# Patient Record
Sex: MALE | Race: WHITE | NOT HISPANIC OR LATINO | URBAN - METROPOLITAN AREA
[De-identification: names, ages, dates, MRNs, and addresses within clinical notes are randomized per-mention and may not be internally consistent; named-entity substitution may affect disease eponyms.]

---

## 2018-07-27 ENCOUNTER — TELEPHONE (OUTPATIENT)
Dept: NEUROSURGERY | Facility: CLINIC | Age: 48
End: 2018-07-27

## 2018-07-27 RX ORDER — LIDOCAINE 50 MG/G
1 PATCH TOPICAL DAILY
Qty: 30 PATCH | Refills: 1 | Status: SHIPPED | OUTPATIENT
Start: 2018-07-27 | End: 2018-08-26

## 2018-07-27 NOTE — TELEPHONE ENCOUNTER
Patient would like a phone call back regarding his pain. He states that he is loosing weight and in agony and doesn't know what to do. Please give him a call back at your earliest convenience.

## 2018-08-15 ENCOUNTER — TELEPHONE (OUTPATIENT)
Dept: NEUROSURGERY | Facility: CLINIC | Age: 48
End: 2018-08-15

## 2018-08-15 NOTE — TELEPHONE ENCOUNTER
Patient called he is experiencing T12 level sharp stabbing pain  that is traveling down to right side please  Pt has been using ice pack  and discuss with Qi about  lidocaine patch  not working  with please call patient at [phone]

## 2018-08-16 NOTE — TELEPHONE ENCOUNTER
Spoke with patient, rec pain management as dr mckeon had suggested.  Also offered xray but he declined.  Gave patient number to [facility] mgmnt.

## 2020-06-04 ENCOUNTER — HOSPITAL ENCOUNTER (INPATIENT)
Facility: HOSPITAL | Age: 50
LOS: 2 days | Discharge: HOME | DRG: 871 | End: 2020-06-06
Attending: EMERGENCY MEDICINE | Admitting: INTERNAL MEDICINE
Payer: COMMERCIAL

## 2020-06-04 ENCOUNTER — APPOINTMENT (EMERGENCY)
Dept: CT IMAGING | Facility: HOSPITAL | Age: 50
DRG: 871 | End: 2020-06-04
Payer: COMMERCIAL

## 2020-06-04 DIAGNOSIS — J18.9 MULTIFOCAL PNEUMONIA: ICD-10-CM

## 2020-06-04 DIAGNOSIS — D72.829 LEUKOCYTOSIS: Primary | ICD-10-CM

## 2020-06-04 DIAGNOSIS — I48.91 NEW ONSET ATRIAL FIBRILLATION (HCC): ICD-10-CM

## 2020-06-04 DIAGNOSIS — J18.9 SEPSIS DUE TO PNEUMONIA (HCC): ICD-10-CM

## 2020-06-04 DIAGNOSIS — A41.9 SEPSIS DUE TO PNEUMONIA (HCC): ICD-10-CM

## 2020-06-04 DIAGNOSIS — I48.91 ATRIAL FIBRILLATION WITH RVR (HCC): ICD-10-CM

## 2020-06-04 LAB
ALBUMIN SERPL BCP-MCNC: 4.1 G/DL (ref 3.5–5)
ALP SERPL-CCNC: 72 U/L (ref 46–116)
ALT SERPL W P-5'-P-CCNC: 36 U/L (ref 12–78)
ANION GAP SERPL CALCULATED.3IONS-SCNC: 7 MMOL/L (ref 4–13)
AST SERPL W P-5'-P-CCNC: 27 U/L (ref 5–45)
BACTERIA UR QL AUTO: ABNORMAL /HPF
BASOPHILS # BLD AUTO: 0.07 THOUSANDS/ΜL (ref 0–0.1)
BASOPHILS NFR BLD AUTO: 0 % (ref 0–1)
BILIRUB SERPL-MCNC: 0.9 MG/DL (ref 0.2–1)
BILIRUB UR QL STRIP: NEGATIVE
BUN SERPL-MCNC: 11 MG/DL (ref 5–25)
CALCIUM SERPL-MCNC: 8.9 MG/DL (ref 8.3–10.1)
CHLORIDE SERPL-SCNC: 101 MMOL/L (ref 100–108)
CLARITY UR: CLEAR
CO2 SERPL-SCNC: 27 MMOL/L (ref 21–32)
COLOR UR: YELLOW
CREAT SERPL-MCNC: 0.81 MG/DL (ref 0.6–1.3)
EOSINOPHIL # BLD AUTO: 0.02 THOUSAND/ΜL (ref 0–0.61)
EOSINOPHIL NFR BLD AUTO: 0 % (ref 0–6)
ERYTHROCYTE [DISTWIDTH] IN BLOOD BY AUTOMATED COUNT: 12.9 % (ref 11.6–15.1)
GFR SERPL CREATININE-BSD FRML MDRD: 105 ML/MIN/1.73SQ M
GLUCOSE SERPL-MCNC: 106 MG/DL (ref 65–140)
GLUCOSE UR STRIP-MCNC: NEGATIVE MG/DL
HCT VFR BLD AUTO: 46.5 % (ref 36.5–49.3)
HGB BLD-MCNC: 15.8 G/DL (ref 12–17)
HGB UR QL STRIP.AUTO: ABNORMAL
IMM GRANULOCYTES # BLD AUTO: 0.13 THOUSAND/UL (ref 0–0.2)
IMM GRANULOCYTES NFR BLD AUTO: 1 % (ref 0–2)
KETONES UR STRIP-MCNC: NEGATIVE MG/DL
LACTATE SERPL-SCNC: 1.1 MMOL/L (ref 0.5–2)
LEUKOCYTE ESTERASE UR QL STRIP: ABNORMAL
LIPASE SERPL-CCNC: 96 U/L (ref 73–393)
LYMPHOCYTES # BLD AUTO: 0.98 THOUSANDS/ΜL (ref 0.6–4.47)
LYMPHOCYTES NFR BLD AUTO: 5 % (ref 14–44)
MAGNESIUM SERPL-MCNC: 2 MG/DL (ref 1.6–2.6)
MCH RBC QN AUTO: 28.8 PG (ref 26.8–34.3)
MCHC RBC AUTO-ENTMCNC: 34 G/DL (ref 31.4–37.4)
MCV RBC AUTO: 85 FL (ref 82–98)
MONOCYTES # BLD AUTO: 1.03 THOUSAND/ΜL (ref 0.17–1.22)
MONOCYTES NFR BLD AUTO: 5 % (ref 4–12)
NEUTROPHILS # BLD AUTO: 17.85 THOUSANDS/ΜL (ref 1.85–7.62)
NEUTS SEG NFR BLD AUTO: 89 % (ref 43–75)
NITRITE UR QL STRIP: NEGATIVE
NON-SQ EPI CELLS URNS QL MICRO: ABNORMAL /HPF
NRBC BLD AUTO-RTO: 0 /100 WBCS
PH UR STRIP.AUTO: 6 [PH]
PLATELET # BLD AUTO: 247 THOUSANDS/UL (ref 149–390)
PMV BLD AUTO: 10.5 FL (ref 8.9–12.7)
POTASSIUM SERPL-SCNC: 3.7 MMOL/L (ref 3.5–5.3)
PROT SERPL-MCNC: 8.1 G/DL (ref 6.4–8.2)
PROT UR STRIP-MCNC: NEGATIVE MG/DL
RBC # BLD AUTO: 5.49 MILLION/UL (ref 3.88–5.62)
RBC #/AREA URNS AUTO: ABNORMAL /HPF
SARS-COV-2 RNA RESP QL NAA+PROBE: NEGATIVE
SODIUM SERPL-SCNC: 135 MMOL/L (ref 136–145)
SP GR UR STRIP.AUTO: 1.01 (ref 1–1.03)
TROPONIN I SERPL-MCNC: <0.02 NG/ML
UROBILINOGEN UR QL STRIP.AUTO: 0.2 E.U./DL
WBC # BLD AUTO: 20.08 THOUSAND/UL (ref 4.31–10.16)
WBC #/AREA URNS AUTO: ABNORMAL /HPF

## 2020-06-04 PROCEDURE — 87635 SARS-COV-2 COVID-19 AMP PRB: CPT | Performed by: PHYSICIAN ASSISTANT

## 2020-06-04 PROCEDURE — 94760 N-INVAS EAR/PLS OXIMETRY 1: CPT

## 2020-06-04 PROCEDURE — 94664 DEMO&/EVAL PT USE INHALER: CPT

## 2020-06-04 PROCEDURE — 71260 CT THORAX DX C+: CPT

## 2020-06-04 PROCEDURE — 84484 ASSAY OF TROPONIN QUANT: CPT | Performed by: PHYSICIAN ASSISTANT

## 2020-06-04 PROCEDURE — 83735 ASSAY OF MAGNESIUM: CPT | Performed by: PHYSICIAN ASSISTANT

## 2020-06-04 PROCEDURE — 36415 COLL VENOUS BLD VENIPUNCTURE: CPT | Performed by: PHYSICIAN ASSISTANT

## 2020-06-04 PROCEDURE — 80053 COMPREHEN METABOLIC PANEL: CPT | Performed by: PHYSICIAN ASSISTANT

## 2020-06-04 PROCEDURE — 99223 1ST HOSP IP/OBS HIGH 75: CPT | Performed by: PHYSICIAN ASSISTANT

## 2020-06-04 PROCEDURE — 87040 BLOOD CULTURE FOR BACTERIA: CPT | Performed by: PHYSICIAN ASSISTANT

## 2020-06-04 PROCEDURE — 74177 CT ABD & PELVIS W/CONTRAST: CPT

## 2020-06-04 PROCEDURE — 99285 EMERGENCY DEPT VISIT HI MDM: CPT | Performed by: PHYSICIAN ASSISTANT

## 2020-06-04 PROCEDURE — 85025 COMPLETE CBC W/AUTO DIFF WBC: CPT | Performed by: PHYSICIAN ASSISTANT

## 2020-06-04 PROCEDURE — 93005 ELECTROCARDIOGRAM TRACING: CPT

## 2020-06-04 PROCEDURE — 99285 EMERGENCY DEPT VISIT HI MDM: CPT

## 2020-06-04 PROCEDURE — 81001 URINALYSIS AUTO W/SCOPE: CPT | Performed by: PHYSICIAN ASSISTANT

## 2020-06-04 PROCEDURE — 83605 ASSAY OF LACTIC ACID: CPT | Performed by: PHYSICIAN ASSISTANT

## 2020-06-04 PROCEDURE — 83690 ASSAY OF LIPASE: CPT | Performed by: PHYSICIAN ASSISTANT

## 2020-06-04 PROCEDURE — 96361 HYDRATE IV INFUSION ADD-ON: CPT

## 2020-06-04 PROCEDURE — 96374 THER/PROPH/DIAG INJ IV PUSH: CPT

## 2020-06-04 RX ORDER — TIZANIDINE 4 MG/1
4 TABLET ORAL
Status: DISCONTINUED | OUTPATIENT
Start: 2020-06-04 | End: 2020-06-06 | Stop reason: HOSPADM

## 2020-06-04 RX ORDER — CEFEPIME HYDROCHLORIDE 2 G/50ML
2000 INJECTION, SOLUTION INTRAVENOUS ONCE
Status: COMPLETED | OUTPATIENT
Start: 2020-06-04 | End: 2020-06-04

## 2020-06-04 RX ORDER — DIAZEPAM 5 MG/1
10 TABLET ORAL
Status: DISCONTINUED | OUTPATIENT
Start: 2020-06-04 | End: 2020-06-06 | Stop reason: HOSPADM

## 2020-06-04 RX ORDER — ONDANSETRON 2 MG/ML
4 INJECTION INTRAMUSCULAR; INTRAVENOUS EVERY 6 HOURS PRN
Status: DISCONTINUED | OUTPATIENT
Start: 2020-06-04 | End: 2020-06-06 | Stop reason: HOSPADM

## 2020-06-04 RX ORDER — GABAPENTIN 400 MG/1
400 CAPSULE ORAL 3 TIMES DAILY
Status: DISCONTINUED | OUTPATIENT
Start: 2020-06-04 | End: 2020-06-05

## 2020-06-04 RX ORDER — SODIUM CHLORIDE 9 MG/ML
100 INJECTION, SOLUTION INTRAVENOUS CONTINUOUS
Status: DISCONTINUED | OUTPATIENT
Start: 2020-06-04 | End: 2020-06-05

## 2020-06-04 RX ORDER — OXYBUTYNIN CHLORIDE 5 MG/1
10 TABLET, EXTENDED RELEASE ORAL 2 TIMES DAILY
Status: DISCONTINUED | OUTPATIENT
Start: 2020-06-05 | End: 2020-06-05

## 2020-06-04 RX ORDER — ONDANSETRON 2 MG/ML
4 INJECTION INTRAMUSCULAR; INTRAVENOUS ONCE
Status: COMPLETED | OUTPATIENT
Start: 2020-06-04 | End: 2020-06-04

## 2020-06-04 RX ADMIN — DIAZEPAM 10 MG: 5 TABLET ORAL at 23:39

## 2020-06-04 RX ADMIN — SODIUM CHLORIDE 100 ML/HR: 0.9 INJECTION, SOLUTION INTRAVENOUS at 23:34

## 2020-06-04 RX ADMIN — ONDANSETRON 4 MG: 2 INJECTION INTRAMUSCULAR; INTRAVENOUS at 17:11

## 2020-06-04 RX ADMIN — IOHEXOL 100 ML: 350 INJECTION, SOLUTION INTRAVENOUS at 18:21

## 2020-06-04 RX ADMIN — VANCOMYCIN HYDROCHLORIDE 1250 MG: 1 INJECTION, POWDER, LYOPHILIZED, FOR SOLUTION INTRAVENOUS at 21:53

## 2020-06-04 RX ADMIN — CEFEPIME HYDROCHLORIDE 2000 MG: 2 INJECTION, SOLUTION INTRAVENOUS at 20:02

## 2020-06-04 RX ADMIN — SODIUM CHLORIDE 1000 ML: 0.9 INJECTION, SOLUTION INTRAVENOUS at 17:10

## 2020-06-04 RX ADMIN — GABAPENTIN 400 MG: 400 CAPSULE ORAL at 23:26

## 2020-06-04 RX ADMIN — TIZANIDINE 4 MG: 4 TABLET ORAL at 23:27

## 2020-06-04 RX ADMIN — APIXABAN 5 MG: 5 TABLET, FILM COATED ORAL at 23:26

## 2020-06-05 ENCOUNTER — APPOINTMENT (INPATIENT)
Dept: NON INVASIVE DIAGNOSTICS | Facility: HOSPITAL | Age: 50
DRG: 871 | End: 2020-06-05
Payer: COMMERCIAL

## 2020-06-05 PROBLEM — G82.20 PARAPLEGIA (HCC): Status: ACTIVE | Noted: 2020-06-05

## 2020-06-05 LAB
ALBUMIN SERPL BCP-MCNC: 3 G/DL (ref 3.5–5)
ALP SERPL-CCNC: 54 U/L (ref 46–116)
ALT SERPL W P-5'-P-CCNC: 28 U/L (ref 12–78)
ANION GAP SERPL CALCULATED.3IONS-SCNC: 5 MMOL/L (ref 4–13)
AST SERPL W P-5'-P-CCNC: 20 U/L (ref 5–45)
ATRIAL RATE: 0 BPM
ATRIAL RATE: 113 BPM
BILIRUB SERPL-MCNC: 0.8 MG/DL (ref 0.2–1)
BUN SERPL-MCNC: 15 MG/DL (ref 5–25)
CALCIUM SERPL-MCNC: 8.3 MG/DL (ref 8.3–10.1)
CHLORIDE SERPL-SCNC: 106 MMOL/L (ref 100–108)
CO2 SERPL-SCNC: 29 MMOL/L (ref 21–32)
CREAT SERPL-MCNC: 1.04 MG/DL (ref 0.6–1.3)
ERYTHROCYTE [DISTWIDTH] IN BLOOD BY AUTOMATED COUNT: 13.2 % (ref 11.6–15.1)
GFR SERPL CREATININE-BSD FRML MDRD: 85 ML/MIN/1.73SQ M
GLUCOSE SERPL-MCNC: 104 MG/DL (ref 65–140)
HCT VFR BLD AUTO: 41.6 % (ref 36.5–49.3)
HGB BLD-MCNC: 12.9 G/DL (ref 12–17)
MAGNESIUM SERPL-MCNC: 2.2 MG/DL (ref 1.6–2.6)
MCH RBC QN AUTO: 27.4 PG (ref 26.8–34.3)
MCHC RBC AUTO-ENTMCNC: 31 G/DL (ref 31.4–37.4)
MCV RBC AUTO: 88 FL (ref 82–98)
PHOSPHATE SERPL-MCNC: 3.4 MG/DL (ref 2.7–4.5)
PLATELET # BLD AUTO: 220 THOUSANDS/UL (ref 149–390)
PMV BLD AUTO: 10.8 FL (ref 8.9–12.7)
POTASSIUM SERPL-SCNC: 3.8 MMOL/L (ref 3.5–5.3)
PROCALCITONIN SERPL-MCNC: 0.06 NG/ML
PROCALCITONIN SERPL-MCNC: 0.08 NG/ML
PROT SERPL-MCNC: 6.6 G/DL (ref 6.4–8.2)
QRS AXIS: 0 DEGREES
QRS AXIS: 45 DEGREES
QRSD INTERVAL: 0 MS
QRSD INTERVAL: 82 MS
QT INTERVAL: 0 MS
QT INTERVAL: 294 MS
QTC INTERVAL: 0 MS
QTC INTERVAL: 463 MS
RBC # BLD AUTO: 4.71 MILLION/UL (ref 3.88–5.62)
SODIUM SERPL-SCNC: 140 MMOL/L (ref 136–145)
T WAVE AXIS: 0 DEGREES
T WAVE AXIS: 23 DEGREES
TROPONIN I SERPL-MCNC: <0.02 NG/ML
TROPONIN I SERPL-MCNC: <0.02 NG/ML
VENTRICULAR RATE: 0 BPM
VENTRICULAR RATE: 149 BPM
WBC # BLD AUTO: 11.55 THOUSAND/UL (ref 4.31–10.16)

## 2020-06-05 PROCEDURE — 84145 PROCALCITONIN (PCT): CPT | Performed by: PHYSICIAN ASSISTANT

## 2020-06-05 PROCEDURE — 85027 COMPLETE CBC AUTOMATED: CPT | Performed by: PHYSICIAN ASSISTANT

## 2020-06-05 PROCEDURE — 83735 ASSAY OF MAGNESIUM: CPT | Performed by: PHYSICIAN ASSISTANT

## 2020-06-05 PROCEDURE — 93010 ELECTROCARDIOGRAM REPORT: CPT | Performed by: INTERNAL MEDICINE

## 2020-06-05 PROCEDURE — 99232 SBSQ HOSP IP/OBS MODERATE 35: CPT | Performed by: FAMILY MEDICINE

## 2020-06-05 PROCEDURE — 87449 NOS EACH ORGANISM AG IA: CPT | Performed by: PHYSICIAN ASSISTANT

## 2020-06-05 PROCEDURE — 93306 TTE W/DOPPLER COMPLETE: CPT | Performed by: INTERNAL MEDICINE

## 2020-06-05 PROCEDURE — 84484 ASSAY OF TROPONIN QUANT: CPT | Performed by: PHYSICIAN ASSISTANT

## 2020-06-05 PROCEDURE — 84100 ASSAY OF PHOSPHORUS: CPT | Performed by: PHYSICIAN ASSISTANT

## 2020-06-05 PROCEDURE — 80053 COMPREHEN METABOLIC PANEL: CPT | Performed by: PHYSICIAN ASSISTANT

## 2020-06-05 PROCEDURE — 93306 TTE W/DOPPLER COMPLETE: CPT

## 2020-06-05 PROCEDURE — 99222 1ST HOSP IP/OBS MODERATE 55: CPT | Performed by: INTERNAL MEDICINE

## 2020-06-05 RX ORDER — SODIUM CHLORIDE 9 MG/ML
100 INJECTION, SOLUTION INTRAVENOUS ONCE
Status: COMPLETED | OUTPATIENT
Start: 2020-06-05 | End: 2020-06-05

## 2020-06-05 RX ORDER — CEFEPIME HYDROCHLORIDE 2 G/50ML
2000 INJECTION, SOLUTION INTRAVENOUS EVERY 12 HOURS
Status: DISCONTINUED | OUTPATIENT
Start: 2020-06-05 | End: 2020-06-05

## 2020-06-05 RX ORDER — GABAPENTIN 400 MG/1
800 CAPSULE ORAL 2 TIMES DAILY
Status: DISCONTINUED | OUTPATIENT
Start: 2020-06-05 | End: 2020-06-06 | Stop reason: HOSPADM

## 2020-06-05 RX ORDER — CEFTRIAXONE 1 G/50ML
1000 INJECTION, SOLUTION INTRAVENOUS EVERY 24 HOURS
Status: DISCONTINUED | OUTPATIENT
Start: 2020-06-05 | End: 2020-06-06 | Stop reason: HOSPADM

## 2020-06-05 RX ORDER — OXYBUTYNIN CHLORIDE 5 MG/1
5 TABLET, EXTENDED RELEASE ORAL DAILY
Status: DISCONTINUED | OUTPATIENT
Start: 2020-06-06 | End: 2020-06-06 | Stop reason: HOSPADM

## 2020-06-05 RX ORDER — GABAPENTIN 400 MG/1
400 CAPSULE ORAL
Status: DISCONTINUED | OUTPATIENT
Start: 2020-06-05 | End: 2020-06-06 | Stop reason: HOSPADM

## 2020-06-05 RX ADMIN — CEFTRIAXONE 1000 MG: 1 INJECTION, SOLUTION INTRAVENOUS at 08:45

## 2020-06-05 RX ADMIN — DIAZEPAM 10 MG: 5 TABLET ORAL at 21:47

## 2020-06-05 RX ADMIN — APIXABAN 5 MG: 5 TABLET, FILM COATED ORAL at 08:46

## 2020-06-05 RX ADMIN — GABAPENTIN 400 MG: 400 CAPSULE ORAL at 21:47

## 2020-06-05 RX ADMIN — SODIUM CHLORIDE 100 ML/HR: 0.9 INJECTION, SOLUTION INTRAVENOUS at 08:45

## 2020-06-05 RX ADMIN — GABAPENTIN 800 MG: 400 CAPSULE ORAL at 16:55

## 2020-06-05 RX ADMIN — SODIUM CHLORIDE 500 MG: 0.9 INJECTION, SOLUTION INTRAVENOUS at 11:11

## 2020-06-05 RX ADMIN — GABAPENTIN 400 MG: 400 CAPSULE ORAL at 08:45

## 2020-06-05 RX ADMIN — TIZANIDINE 4 MG: 4 TABLET ORAL at 21:47

## 2020-06-05 RX ADMIN — OXYBUTYNIN CHLORIDE 5 MG: 5 TABLET, EXTENDED RELEASE ORAL at 08:46

## 2020-06-06 VITALS
WEIGHT: 179.23 LBS | TEMPERATURE: 98.3 F | HEIGHT: 70 IN | RESPIRATION RATE: 18 BRPM | SYSTOLIC BLOOD PRESSURE: 123 MMHG | OXYGEN SATURATION: 99 % | BODY MASS INDEX: 25.66 KG/M2 | HEART RATE: 81 BPM | DIASTOLIC BLOOD PRESSURE: 79 MMHG

## 2020-06-06 LAB
ANION GAP SERPL CALCULATED.3IONS-SCNC: 4 MMOL/L (ref 4–13)
BASOPHILS # BLD AUTO: 0.06 THOUSANDS/ΜL (ref 0–0.1)
BASOPHILS NFR BLD AUTO: 1 % (ref 0–1)
BUN SERPL-MCNC: 10 MG/DL (ref 5–25)
CALCIUM SERPL-MCNC: 8.4 MG/DL (ref 8.3–10.1)
CHLORIDE SERPL-SCNC: 105 MMOL/L (ref 100–108)
CO2 SERPL-SCNC: 30 MMOL/L (ref 21–32)
CREAT SERPL-MCNC: 0.84 MG/DL (ref 0.6–1.3)
EOSINOPHIL # BLD AUTO: 0.15 THOUSAND/ΜL (ref 0–0.61)
EOSINOPHIL NFR BLD AUTO: 2 % (ref 0–6)
ERYTHROCYTE [DISTWIDTH] IN BLOOD BY AUTOMATED COUNT: 13.2 % (ref 11.6–15.1)
GFR SERPL CREATININE-BSD FRML MDRD: 104 ML/MIN/1.73SQ M
GLUCOSE SERPL-MCNC: 111 MG/DL (ref 65–140)
HCT VFR BLD AUTO: 41.6 % (ref 36.5–49.3)
HGB BLD-MCNC: 13.5 G/DL (ref 12–17)
IMM GRANULOCYTES # BLD AUTO: 0.02 THOUSAND/UL (ref 0–0.2)
IMM GRANULOCYTES NFR BLD AUTO: 0 % (ref 0–2)
L PNEUMO1 AG UR QL IA.RAPID: NEGATIVE
LYMPHOCYTES # BLD AUTO: 1.55 THOUSANDS/ΜL (ref 0.6–4.47)
LYMPHOCYTES NFR BLD AUTO: 22 % (ref 14–44)
MCH RBC QN AUTO: 28.9 PG (ref 26.8–34.3)
MCHC RBC AUTO-ENTMCNC: 32.5 G/DL (ref 31.4–37.4)
MCV RBC AUTO: 89 FL (ref 82–98)
MONOCYTES # BLD AUTO: 0.58 THOUSAND/ΜL (ref 0.17–1.22)
MONOCYTES NFR BLD AUTO: 8 % (ref 4–12)
NEUTROPHILS # BLD AUTO: 4.82 THOUSANDS/ΜL (ref 1.85–7.62)
NEUTS SEG NFR BLD AUTO: 67 % (ref 43–75)
NRBC BLD AUTO-RTO: 0 /100 WBCS
PLATELET # BLD AUTO: 207 THOUSANDS/UL (ref 149–390)
PMV BLD AUTO: 10.1 FL (ref 8.9–12.7)
POTASSIUM SERPL-SCNC: 3.8 MMOL/L (ref 3.5–5.3)
PROCALCITONIN SERPL-MCNC: <0.05 NG/ML
RBC # BLD AUTO: 4.67 MILLION/UL (ref 3.88–5.62)
S PNEUM AG UR QL: NEGATIVE
SODIUM SERPL-SCNC: 139 MMOL/L (ref 136–145)
WBC # BLD AUTO: 7.18 THOUSAND/UL (ref 4.31–10.16)

## 2020-06-06 PROCEDURE — 84145 PROCALCITONIN (PCT): CPT | Performed by: FAMILY MEDICINE

## 2020-06-06 PROCEDURE — 80048 BASIC METABOLIC PNL TOTAL CA: CPT | Performed by: FAMILY MEDICINE

## 2020-06-06 PROCEDURE — 99239 HOSP IP/OBS DSCHRG MGMT >30: CPT | Performed by: FAMILY MEDICINE

## 2020-06-06 PROCEDURE — 85025 COMPLETE CBC W/AUTO DIFF WBC: CPT | Performed by: FAMILY MEDICINE

## 2020-06-06 RX ORDER — CEFUROXIME AXETIL 500 MG/1
500 TABLET ORAL EVERY 12 HOURS SCHEDULED
Qty: 12 TABLET | Refills: 0 | Status: SHIPPED | OUTPATIENT
Start: 2020-06-06 | End: 2020-06-12

## 2020-06-06 RX ADMIN — CEFTRIAXONE 1000 MG: 1 INJECTION, SOLUTION INTRAVENOUS at 08:17

## 2020-06-06 RX ADMIN — GABAPENTIN 800 MG: 400 CAPSULE ORAL at 06:28

## 2020-06-06 RX ADMIN — SODIUM CHLORIDE 500 MG: 0.9 INJECTION, SOLUTION INTRAVENOUS at 09:03

## 2020-06-09 LAB
BACTERIA BLD CULT: NORMAL
BACTERIA BLD CULT: NORMAL

## 2020-07-24 ENCOUNTER — TELEPHONE (OUTPATIENT)
Dept: CARDIOLOGY CLINIC | Facility: HOSPITAL | Age: 50
End: 2020-07-24

## 2020-07-24 NOTE — TELEPHONE ENCOUNTER
Spoke with Serena Denise he does not feel it is necessary to come as his problems are gone  He will call back if needed

## 2021-03-03 ENCOUNTER — HOSPITAL ENCOUNTER (EMERGENCY)
Facility: HOSPITAL | Age: 51
Discharge: HOME | End: 2021-03-03
Attending: EMERGENCY MEDICINE
Payer: OTHER MISCELLANEOUS

## 2021-03-03 VITALS
BODY MASS INDEX: 25.66 KG/M2 | HEART RATE: 98 BPM | DIASTOLIC BLOOD PRESSURE: 98 MMHG | HEIGHT: 70 IN | OXYGEN SATURATION: 96 % | TEMPERATURE: 100.1 F | WEIGHT: 179.23 LBS | RESPIRATION RATE: 16 BRPM | SYSTOLIC BLOOD PRESSURE: 158 MMHG

## 2021-03-03 DIAGNOSIS — N39.0 UTI (URINARY TRACT INFECTION): Primary | ICD-10-CM

## 2021-03-03 LAB
ANION GAP SERPL CALCULATED.3IONS-SCNC: 10 MMOL/L (ref 4–13)
BACTERIA UR QL AUTO: NORMAL /HPF
BASOPHILS # BLD AUTO: 0.08 THOUSANDS/ΜL (ref 0–0.1)
BASOPHILS NFR BLD AUTO: 1 % (ref 0–1)
BILIRUB UR QL STRIP: NEGATIVE
BUN SERPL-MCNC: 13 MG/DL (ref 5–25)
CALCIUM SERPL-MCNC: 9.3 MG/DL (ref 8.3–10.1)
CHLORIDE SERPL-SCNC: 101 MMOL/L (ref 100–108)
CLARITY UR: CLEAR
CO2 SERPL-SCNC: 27 MMOL/L (ref 21–32)
COLOR UR: YELLOW
CREAT SERPL-MCNC: 0.98 MG/DL (ref 0.6–1.3)
EOSINOPHIL # BLD AUTO: 0.11 THOUSAND/ΜL (ref 0–0.61)
EOSINOPHIL NFR BLD AUTO: 1 % (ref 0–6)
ERYTHROCYTE [DISTWIDTH] IN BLOOD BY AUTOMATED COUNT: 12.6 % (ref 11.6–15.1)
GFR SERPL CREATININE-BSD FRML MDRD: 90 ML/MIN/1.73SQ M
GLUCOSE SERPL-MCNC: 110 MG/DL (ref 65–140)
GLUCOSE UR STRIP-MCNC: NEGATIVE MG/DL
HCT VFR BLD AUTO: 44.9 % (ref 36.5–49.3)
HGB BLD-MCNC: 15 G/DL (ref 12–17)
HGB UR QL STRIP.AUTO: NEGATIVE
IMM GRANULOCYTES # BLD AUTO: 0.07 THOUSAND/UL (ref 0–0.2)
IMM GRANULOCYTES NFR BLD AUTO: 0 % (ref 0–2)
KETONES UR STRIP-MCNC: NEGATIVE MG/DL
LEUKOCYTE ESTERASE UR QL STRIP: ABNORMAL
LYMPHOCYTES # BLD AUTO: 1 THOUSANDS/ΜL (ref 0.6–4.47)
LYMPHOCYTES NFR BLD AUTO: 6 % (ref 14–44)
MCH RBC QN AUTO: 28.5 PG (ref 26.8–34.3)
MCHC RBC AUTO-ENTMCNC: 33.4 G/DL (ref 31.4–37.4)
MCV RBC AUTO: 85 FL (ref 82–98)
MONOCYTES # BLD AUTO: 0.85 THOUSAND/ΜL (ref 0.17–1.22)
MONOCYTES NFR BLD AUTO: 5 % (ref 4–12)
NEUTROPHILS # BLD AUTO: 15.49 THOUSANDS/ΜL (ref 1.85–7.62)
NEUTS SEG NFR BLD AUTO: 87 % (ref 43–75)
NITRITE UR QL STRIP: NEGATIVE
NON-SQ EPI CELLS URNS QL MICRO: NORMAL /HPF
NRBC BLD AUTO-RTO: 0 /100 WBCS
PH UR STRIP.AUTO: 7 [PH]
PLATELET # BLD AUTO: 212 THOUSANDS/UL (ref 149–390)
PMV BLD AUTO: 10.3 FL (ref 8.9–12.7)
POTASSIUM SERPL-SCNC: 4 MMOL/L (ref 3.5–5.3)
PROT UR STRIP-MCNC: NEGATIVE MG/DL
RBC # BLD AUTO: 5.26 MILLION/UL (ref 3.88–5.62)
RBC #/AREA URNS AUTO: NORMAL /HPF
SODIUM SERPL-SCNC: 138 MMOL/L (ref 136–145)
SP GR UR STRIP.AUTO: 1.01 (ref 1–1.03)
UROBILINOGEN UR QL STRIP.AUTO: 0.2 E.U./DL
WBC # BLD AUTO: 17.6 THOUSAND/UL (ref 4.31–10.16)
WBC #/AREA URNS AUTO: NORMAL /HPF

## 2021-03-03 PROCEDURE — 80048 BASIC METABOLIC PNL TOTAL CA: CPT | Performed by: PHYSICIAN ASSISTANT

## 2021-03-03 PROCEDURE — 85025 COMPLETE CBC W/AUTO DIFF WBC: CPT | Performed by: PHYSICIAN ASSISTANT

## 2021-03-03 PROCEDURE — 99284 EMERGENCY DEPT VISIT MOD MDM: CPT | Performed by: PHYSICIAN ASSISTANT

## 2021-03-03 PROCEDURE — 81001 URINALYSIS AUTO W/SCOPE: CPT | Performed by: PHYSICIAN ASSISTANT

## 2021-03-03 PROCEDURE — 96365 THER/PROPH/DIAG IV INF INIT: CPT

## 2021-03-03 PROCEDURE — 99283 EMERGENCY DEPT VISIT LOW MDM: CPT

## 2021-03-03 PROCEDURE — 36415 COLL VENOUS BLD VENIPUNCTURE: CPT | Performed by: PHYSICIAN ASSISTANT

## 2021-03-03 RX ORDER — LEVOFLOXACIN 500 MG/1
500 TABLET, FILM COATED ORAL DAILY
Qty: 6 TABLET | Refills: 0 | Status: SHIPPED | OUTPATIENT
Start: 2021-03-04 | End: 2021-03-10

## 2021-03-03 RX ORDER — LEVOFLOXACIN 5 MG/ML
250 INJECTION, SOLUTION INTRAVENOUS ONCE
Status: DISCONTINUED | OUTPATIENT
Start: 2021-03-03 | End: 2021-03-03

## 2021-03-03 RX ORDER — LEVOFLOXACIN 5 MG/ML
500 INJECTION, SOLUTION INTRAVENOUS ONCE
Status: COMPLETED | OUTPATIENT
Start: 2021-03-03 | End: 2021-03-03

## 2021-03-03 RX ADMIN — LEVOFLOXACIN 500 MG: 5 INJECTION, SOLUTION INTRAVENOUS at 19:04

## 2021-03-03 NOTE — ED PROVIDER NOTES
History  Chief Complaint   Patient presents with    Possible UTI     patient reports dark foul smelling urine for the past few days  paralysed so has no burning sensation  Patient is a 54 y/o male presenting to the ED for evaluation of dark, foul-smelling urine x2 days  Patient has a chronic injury that has resulted in lower extremity paralysis as well as neurogenic bladder  He straight caths himself daily  He also reports subjective fevers/chills at home  Also c/o neurogenic pain and says that this flares up when he has an infection  He reports a history of UTI's and is requesting to have his urine tested  Prior to Admission Medications   Prescriptions Last Dose Informant Patient Reported? Taking? TiZANidine (ZANAFLEX) 4 MG capsule   Yes No   Sig: Take 4 mg by mouth daily at bedtime   diazepam (VALIUM) 5 mg tablet   Yes No   Sig: Take 10 mg by mouth daily at bedtime as needed for anxiety    gabapentin (NEURONTIN) 400 mg capsule   Yes No   Sig: Take 400 mg by mouth 3 (three) times a day   oxybutynin (DITROPAN-XL) 10 MG 24 hr tablet   Yes No   Sig: Take 10 mg by mouth 2 (two) times a day      Facility-Administered Medications: None       Past Medical History:   Diagnosis Date    Back injuries     Neurogenic bladder     Renal disorder        Past Surgical History:   Procedure Laterality Date    BACK SURGERY      NEPHRECTOMY         History reviewed  No pertinent family history  I have reviewed and agree with the history as documented  E-Cigarette/Vaping    E-Cigarette Use Never User      E-Cigarette/Vaping Substances     Social History     Tobacco Use    Smoking status: Never Smoker    Smokeless tobacco: Never Used   Substance Use Topics    Alcohol use: No    Drug use: No       Review of Systems   Constitutional: Negative for chills, diaphoresis, fatigue and fever  HENT: Negative for congestion and sore throat  Eyes: Negative for photophobia and visual disturbance     Respiratory: Negative for cough, shortness of breath and wheezing  Cardiovascular: Negative for chest pain and palpitations  Gastrointestinal: Negative for abdominal pain, constipation, diarrhea, nausea and vomiting  Genitourinary: Negative for decreased urine volume, difficulty urinating, dysuria, flank pain and urgency  Dark, foul-smelling urine   Musculoskeletal: Negative for arthralgias, back pain, gait problem, joint swelling, myalgias and neck pain  Skin: Negative for color change, pallor and rash  Neurological: Negative for dizziness, syncope, weakness and headaches  Psychiatric/Behavioral: Negative for confusion and sleep disturbance  All other systems reviewed and are negative  Physical Exam  Physical Exam  Vitals signs and nursing note reviewed  Constitutional:       General: He is awake  He is not in acute distress  Appearance: Normal appearance  He is well-developed  He is not ill-appearing or diaphoretic  HENT:      Head: Normocephalic and atraumatic  Right Ear: External ear normal       Left Ear: External ear normal       Nose: Nose normal       Mouth/Throat:      Lips: Pink  Mouth: Mucous membranes are moist    Eyes:      General: Lids are normal  No scleral icterus  Conjunctiva/sclera: Conjunctivae normal       Pupils: Pupils are equal, round, and reactive to light  Neck:      Musculoskeletal: Full passive range of motion without pain, normal range of motion and neck supple  Normal range of motion  No injury or pain with movement  Cardiovascular:      Rate and Rhythm: Normal rate and regular rhythm  Pulses: Normal pulses  Radial pulses are 2+ on the right side and 2+ on the left side  Heart sounds: Normal heart sounds, S1 normal and S2 normal    Pulmonary:      Effort: Pulmonary effort is normal  No accessory muscle usage  Breath sounds: Normal breath sounds  No stridor  No decreased breath sounds, wheezing, rhonchi or rales  Abdominal:      General: Abdomen is flat  Bowel sounds are normal  There is no distension  Palpations: Abdomen is soft  Tenderness: There is no abdominal tenderness  There is no right CVA tenderness, left CVA tenderness, guarding or rebound  Musculoskeletal:      Right lower leg: No edema  Left lower leg: No edema  Lymphadenopathy:      Cervical: No cervical adenopathy  Skin:     General: Skin is warm and dry  Capillary Refill: Capillary refill takes less than 2 seconds  Coloration: Skin is not cyanotic, jaundiced or pale  Neurological:      Mental Status: He is alert and oriented to person, place, and time  GCS: GCS eye subscore is 4  GCS verbal subscore is 5  GCS motor subscore is 6  Cranial Nerves: No dysarthria or facial asymmetry  Psychiatric:         Attention and Perception: Attention normal          Mood and Affect: Mood normal          Speech: Speech normal          Behavior: Behavior normal  Behavior is cooperative           Vital Signs  ED Triage Vitals [03/03/21 1746]   Temperature Pulse Respirations Blood Pressure SpO2   100 1 °F (37 8 °C) (!) 118 18 (!) 187/107 97 %      Temp Source Heart Rate Source Patient Position - Orthostatic VS BP Location FiO2 (%)   Temporal Monitor Sitting Left arm --      Pain Score       7           Vitals:    03/03/21 1746 03/03/21 1816 03/03/21 1915   BP: (!) 187/107 150/72 (!) 163/109   Pulse: (!) 118 (!) 108 105   Patient Position - Orthostatic VS: Sitting Sitting Sitting         Visual Acuity      ED Medications  Medications   levofloxacin (LEVAQUIN) IVPB (premix in dextrose) 500 mg 100 mL (500 mg Intravenous New Bag 3/3/21 1904)       Diagnostic Studies  Results Reviewed     Procedure Component Value Units Date/Time    Basic metabolic panel [[id number]] Collected: 03/03/21 1901    Lab Status: Final result Specimen: Blood from Arm, Left Updated: 03/03/21 1918     Sodium 138 mmol/L      Potassium 4 0 mmol/L      Chloride 101 mmol/L      CO2 27 mmol/L      ANION GAP 10 mmol/L      BUN 13 mg/dL      Creatinine 0 98 mg/dL      Glucose 110 mg/dL      Calcium 9 3 mg/dL      eGFR 90 ml/min/1 73sq m     Narrative:      National Kidney Disease Foundation guidelines for Chronic Kidney Disease (CKD):     Stage 1 with normal or high GFR (GFR > 90 mL/min/1 73 square meters)    Stage 2 Mild CKD (GFR = 60-89 mL/min/1 73 square meters)    Stage 3A Moderate CKD (GFR = 45-59 mL/min/1 73 square meters)    Stage 3B Moderate CKD (GFR = 30-44 mL/min/1 73 square meters)    Stage 4 Severe CKD (GFR = 15-29 mL/min/1 73 square meters)    Stage 5 End Stage CKD (GFR <15 mL/min/1 73 square meters)  Note: GFR calculation is accurate only with a steady state creatinine    CBC and differential [[id number]]  (Abnormal) Collected: 03/03/21 1901    Lab Status: Final result Specimen: Blood from Arm, Left Updated: 03/03/21 1908     WBC 17 60 Thousand/uL      RBC 5 26 Million/uL      Hemoglobin 15 0 g/dL      Hematocrit 44 9 %      MCV 85 fL      MCH 28 5 pg      MCHC 33 4 g/dL      RDW 12 6 %      MPV 10 3 fL      Platelets 142 Thousands/uL      nRBC 0 /100 WBCs      Neutrophils Relative 87 %      Immat GRANS % 0 %      Lymphocytes Relative 6 %      Monocytes Relative 5 %      Eosinophils Relative 1 %      Basophils Relative 1 %      Neutrophils Absolute 15 49 Thousands/µL      Immature Grans Absolute 0 07 Thousand/uL      Lymphocytes Absolute 1 00 Thousands/µL      Monocytes Absolute 0 85 Thousand/µL      Eosinophils Absolute 0 11 Thousand/µL      Basophils Absolute 0 08 Thousands/µL     Urine Microscopic [[id number]]  (Normal) Collected: 03/03/21 1817    Lab Status: Final result Specimen: Urine, Straight Cath Updated: 03/03/21 1835     RBC, UA 1-2 /hpf      WBC, UA 2-4 /hpf      Epithelial Cells Occasional /hpf      Bacteria, UA Occasional /hpf     UA w Reflex to Microscopic w Reflex to Culture [[id number]]  (Abnormal) Collected: 03/03/21 1817    Lab Status: Final result Specimen: Urine, Straight Cath Updated: 03/03/21 1824     Color, UA Yellow     Clarity, UA Clear     Specific Gravity, UA 1 015     pH, UA 7 0     Leukocytes, UA Small     Nitrite, UA Negative     Protein, UA Negative mg/dl      Glucose, UA Negative mg/dl      Ketones, UA Negative mg/dl      Urobilinogen, UA 0 2 E U /dl      Bilirubin, UA Negative     Blood, UA Negative                 No orders to display              Procedures  Procedures         ED Course                             SBIRT 20yo+      Most Recent Value   SBIRT (24 yo +)   In order to provide better care to our patients, we are screening all of our patients for alcohol and drug use  Would it be okay to ask you these screening questions? Yes Filed at: 03/03/2021 1802   Initial Alcohol Screen: US AUDIT-C    1  How often do you have a drink containing alcohol?  0 Filed at: 03/03/2021 1802   2  How many drinks containing alcohol do you have on a typical day you are drinking? 0 Filed at: 03/03/2021 1802   3a  Male UNDER 65: How often do you have five or more drinks on one occasion? 0 Filed at: 03/03/2021 1802   Audit-C Score  0 Filed at: 03/03/2021 2177   JOCELYN: How many times in the past year have you    Used an illegal drug or used a prescription medication for non-medical reasons? Never Filed at: 03/03/2021 1802                    MDM  Number of Diagnoses or Management Options  UTI (urinary tract infection):   Diagnosis management comments: Patient is a 52 y/o male presenting to the ED for evaluation of dark, foul-smelling urine x2 days  Patient has a chronic injury that has resulted in lower extremity paralysis as well as neurogenic bladder  He straight caths himself daily  Patient initially refusing everything except a urine sample  UA with small amount of leukocytes   Patient informed nurse that Levaquin is the only antibiotic that has worked for him in the past  Patient says he will not be able to make it to his pharmacy tonight and offered a dose of abx in ED  Patient requesting abx through the IV as he believes this will prevent him from having continued neurogenic pain and incontinence all night  Patient says he has been dealing with these issues for years and this is the only thing that works  One dose Levaquin given in ED  Rx Levaquin sent to patient's pharmacy  The management plan was discussed in detail with the patient at bedside and all questions were answered  Prior to discharge, verbal and written instructions provided  Strict ED return precautions discussed in detail  The patient verbalized understanding of our discussion and plan of care, and agrees to return to the Emergency Department for concerns and progression of illness  Amount and/or Complexity of Data Reviewed  Clinical lab tests: ordered and reviewed        Disposition  Final diagnoses:   UTI (urinary tract infection)     Time reflects when diagnosis was documented in both MDM as applicable and the Disposition within this note     Time User Action Codes Description Comment    3/3/2021  6:16 PM Adin Easley Add [N39 0] UTI (urinary tract infection)       ED Disposition     ED Disposition Condition Date/Time Comment    Discharge Stable Wed Mar 3, 2021  7:23 PM [address] discharge to home/self care              Follow-up Information     Follow up With Specialties Details Why [place] [address] Emergency Department Emergency Medicine  If symptoms worsen [facility] [phone]  [facility] Emergency Department, [address], [address], PAHemantC Physician Assistant  As needed Tyrone FRIAS  [address] [place] [address]             Patient's Medications   Discharge Prescriptions    LEVOFLOXACIN (LEVAQUIN) 500 MG TABLET    Take 1 tablet (500 mg total) by mouth daily for 6 days       Start Date: 3/4/2021  End Date: 3/10/2021       Order Dose: 500 mg       Quantity: 6 tablet    Refills: 0     No discharge procedures on file      PDMP Review     None          ED Provider  Electronically Signed by           Lauren Rodriguez PA-C  03/03/21 3829

## 2021-03-03 NOTE — ED NOTES
Patient straight performing self catherization at this time in sterile bag for urine specimen        Meredith Flores RN  03/03/21 2333

## 2021-09-23 ENCOUNTER — HOSPITAL ENCOUNTER (EMERGENCY)
Facility: HOSPITAL | Age: 51
Discharge: HOME | End: 2021-09-23
Attending: EMERGENCY MEDICINE
Payer: OTHER MISCELLANEOUS

## 2021-09-23 VITALS
RESPIRATION RATE: 18 BRPM | OXYGEN SATURATION: 96 % | DIASTOLIC BLOOD PRESSURE: 85 MMHG | HEART RATE: 93 BPM | SYSTOLIC BLOOD PRESSURE: 193 MMHG | TEMPERATURE: 97 F

## 2021-09-23 DIAGNOSIS — Z51.89 VISIT FOR WOUND CHECK: Primary | ICD-10-CM

## 2021-09-23 PROCEDURE — 99284 EMERGENCY DEPT VISIT MOD MDM: CPT | Performed by: PHYSICIAN ASSISTANT

## 2021-09-23 PROCEDURE — 99283 EMERGENCY DEPT VISIT LOW MDM: CPT

## 2021-09-23 RX ORDER — CLINDAMYCIN HYDROCHLORIDE 300 MG/1
300 CAPSULE ORAL 4 TIMES DAILY
Qty: 28 CAPSULE | Refills: 0 | Status: SHIPPED | OUTPATIENT
Start: 2021-09-23 | End: 2021-09-30

## 2021-09-27 ENCOUNTER — OFFICE VISIT (OUTPATIENT)
Dept: WOUND CARE | Facility: CLINIC | Age: 51
End: 2021-09-27
Payer: OTHER MISCELLANEOUS

## 2021-09-27 VITALS
RESPIRATION RATE: 20 BRPM | DIASTOLIC BLOOD PRESSURE: 96 MMHG | HEIGHT: 71 IN | BODY MASS INDEX: 25 KG/M2 | SYSTOLIC BLOOD PRESSURE: 152 MMHG | TEMPERATURE: 98.2 F | HEART RATE: 84 BPM

## 2021-09-27 DIAGNOSIS — L89.523 PRESSURE INJURY OF LEFT ANKLE, STAGE 3 (HCC): Primary | ICD-10-CM

## 2021-09-27 DIAGNOSIS — S81.801A OPEN WOUND OF RIGHT LOWER LEG, INITIAL ENCOUNTER: ICD-10-CM

## 2021-09-27 DIAGNOSIS — L89.893 PRESSURE ULCER OF TOE OF RIGHT FOOT, STAGE 3 (HCC): ICD-10-CM

## 2021-09-27 DIAGNOSIS — G82.20 PARAPLEGIA (HCC): ICD-10-CM

## 2021-09-27 PROCEDURE — 99214 OFFICE O/P EST MOD 30 MIN: CPT | Performed by: FAMILY MEDICINE

## 2021-09-27 PROCEDURE — 11042 DBRDMT SUBQ TIS 1ST 20SQCM/<: CPT | Performed by: FAMILY MEDICINE

## 2021-09-27 PROCEDURE — 99203 OFFICE O/P NEW LOW 30 MIN: CPT | Performed by: FAMILY MEDICINE

## 2021-09-27 PROCEDURE — 97597 DBRDMT OPN WND 1ST 20 CM/<: CPT | Performed by: FAMILY MEDICINE

## 2021-09-27 RX ORDER — LIDOCAINE 40 MG/G
CREAM TOPICAL ONCE
Status: COMPLETED | OUTPATIENT
Start: 2021-09-27 | End: 2021-09-27

## 2021-09-27 RX ADMIN — LIDOCAINE: 40 CREAM TOPICAL at 10:31

## 2021-09-27 NOTE — PROGRESS NOTES
Debridement   Wound 09/27/21 Toe (Comment  which one) Anterior;Right    Universal Protocol:  Consent: Verbal consent obtained  Written consent obtained  Consent given by: patient  Time out: Immediately prior to procedure a "time out" was called to verify the correct patient, procedure, equipment, support staff and site/side marked as required    Patient understanding: patient states understanding of the procedure being performed  Patient identity confirmed: verbally with patient      Performed by: physician  Debridement type: selective  Pain control: lidocaine 4%  Post-debridement measurements  Length (cm): 0 3  Width (cm): 0 3  Depth (cm): 0 1  Percent debrided: 100%  Surface Area (cm^2): 0 09  Area debrided (cm^2): 0 09  Volume (cm^3): 0 01  Devitalized tissue debrided: exudate, fibrin and slough  Instrument(s) utilized: curette  Bleeding: small  Hemostasis obtained with: pressure  Procedural pain (0-10): 0  Post-procedural pain: 0   Response to treatment: procedure was tolerated well

## 2021-09-27 NOTE — PATIENT INSTRUCTIONS
Orders Placed This Encounter   Procedures    Debridement     This order was created via procedure documentation    Debridement     This order was created via procedure documentation    Wound cleansing and dressings     Wash your hands with soap and water  Remove old dressing, discard into plastic bag and place in trash  Cleanse all ulcers with normal saline or soap and water (Dove unscented) prior to applying a clean dressing  Do not use tissue or cotton balls  Do not scrub the wound  Pat dry using gauze  May apply skin prep to skin surrounding wound  Apply Endoform to the left medial ankle ulcer  Cover with allevyn foam  Change dressing three times per week and as needed for excessive drainage or leakage  This was done today  Apply Derma Wound to right later leg and right 2nd toe ulcer the cover with dry dressing change daily  This was done today  Off-loading Instructions:    Keep weight and pressure off wound at all times  , Use Wheelchair Cushion  (Do not use donut-type devices)  Seat lifts or shift position in chair every 15 minutes  and Do Not Sit for Long Periods of Time  Shower yes with protection  Follow up one week     Standing Status:   Future     Standing Expiration Date:   9/27/2022     High Protein Diet   WHAT YOU NEED TO KNOW:   A high-protein diet is a meal plan that includes extra protein  Your body may need extra protein if you have certain health conditions, such as cancer, burns, or injuries  You may also need to follow this diet to get stronger after a surgery or illness  Extra protein helps to heal wounds and form new tissue in the body  Your dietitian will tell you how much protein and how many calories you need each day  DISCHARGE INSTRUCTIONS:   Foods high in protein:  The average amount of protein is listed below in grams (g)  To find the exact amount of protein in a food, read the food labels on packaged items  · Dairy:      ? 1 cup of any type of milk (8 g)    ?  ½ cup of evaporated canned milk (9 g)    ? ¼ cup of nonfat dry milk (11 g)    ? 1 ounce of semi-hard or solid cheese (7 g)    ? ¼ cup of parmesan cheese (8 g)    ? ½ cup of cottage cheese (14 g)    ? ½ cup of pudding (4 g)    ? 1 cup of plain or fruit yogurt (8 g)    · Meats and meat substitutes:      ? 3 ounces of cooked freshwater fish (21 g)     ? 3 ounces of cooked shellfish (19 g)    ? ½ cup of canned tuna (14 g)    ? 3 ounces of cooked chicken, turkey, or other poultry (24 g)    ? 3 ounces of cooked beef, pork, lamb, or other red meat (21 g)    ? 1 large egg (6 g)    ? ¼ cup of fat free egg substitute (5 g)    ? ½ cup of tofu or tempeh (10 g)    ? 1 cup of cooked dried beans, such as cooper, kidney, or navy (15 g)    · Nuts and seeds:      ? 2 tablespoons of almonds, cashews, sunflower seeds, or walnuts (5 g)    ? 2 tablespoons of peanuts (7 g)    ? 2 tablespoons of peanut butter (8 g)    How to add extra protein:   · Add powdered milk to milk, cereals, scrambled eggs, soups, and casseroles  · Add cheese to sauces, soups, or vegetables  · Add eggs to tuna, salads, sauces, or casseroles  · Add nutrition supplements and breakfast drink mixes to milk or shakes  · Add nuts to foods or eat them as snacks  · Add meat (beef, chicken, and pork) to soups, casseroles, pasta dishes, or vegetables  · Add beans, peas, and other legumes to salads  · Eat cottage cheese or yogurt with fruit  © Copyright [employer] 2021 Information is for End User's use only and may not be sold, redistributed or otherwise used for commercial purposes  All illustrations and images included in CareNotes® are the copyrighted property of A D A M , Inc  or [facility] Caitie Vazquez  The above information is an  only  It is not intended as medical advice for individual conditions or treatments   Talk to your doctor, nurse or pharmacist before following any medical regimen to see if it is safe and effective for you

## 2021-09-27 NOTE — PROGRESS NOTES
Patient ID: Favian Álvarez is a 49 y o  male Date of Birth 1970       Chief Complaint   Patient presents with   [address] [facility] [address] Patient Visit     Patient here today for wounds on left ankle and right lower leg  Allergies:  Codeine, Dilaudid [hydromorphone], and Morphine and related    Diagnosis:      Diagnosis ICD-10-CM Associated Orders   1  Pressure injury of left ankle, stage 3 ([facility])  L89 523 lidocaine (LMX) 4 % cream     Debridement     Wound cleansing and dressings   2  Paraplegia ([facility])  G82 20 Wound cleansing and dressings   3  Pressure ulcer of toe of right foot, stage 3 ([facility])  L89 893 Debridement   4  Open wound of right lower leg, initial encounter  S81 801A            Assessment & Plan:   Stage III Ulcer on medial aspect of ankle of LLE, debrided without difficulty  No obvious infection at this time  Apply Endoform to wound bed and cover with Allevyn foam  (see wound orders)   Pressure ulcer 2nd Right toe, selectively debrided without difficulty  Pt perfers to continue use of Dermawound and cover with dry dressing (see wound orders)   Continue Dermawound on abrasion on lateral aspect of RLE   Pt encouraged to off load as much as possible   F/u in 1 week        Subjective:   9/27/21 Pt is a 49 y o  paraplegic M who presents for initial evaluation of wound on medial aspect of LLE and wound on Rt second toe and abrasion on lateral aspect of RLE  Pt states LLE wound has been reoccurring since 2014  Pt was seen 2 years ago at [facility] wound center but since then has been adamant on not returning  Pt has been applying dermawound to wound and covering it dry dressing  Pt denies any drainage, fever or chills        The following portions of the patient's history were reviewed and updated as appropriate:   Patient Active Problem List   Diagnosis    Atrial fibrillation with RVR ([facility]    Multifocal pneumonia    Sepsis due to pneumonia ([facility]    Paraplegia ([facility] )     Past Medical History:   Diagnosis Date  Back injuries     Neurogenic bladder     Paraplegia (HCC)     Renal disorder      Past Surgical History:   Procedure Laterality Date    BACK SURGERY      NEPHRECTOMY       Family History   Problem Relation Age of Onset    Cancer Brother       Social History     Socioeconomic History    Marital status: Single     Spouse name: None    Number of children: None    Years of education: None    Highest education level: None   Occupational History    None   Tobacco Use    Smoking status: Never Smoker    Smokeless tobacco: Never Used   Vaping Use    Vaping Use: Never used   Substance and Sexual Activity    Alcohol use: No    Drug use: No    Sexual activity: None   Other Topics Concern    None   Social History Narrative    None     Social Determinants of Health     Financial Resource Strain:     Difficulty of Paying Living Expenses:    Food Insecurity:     Worried About Running Out of Food in the Last Year:     Ran Out of Food in the Last Year:    Transportation Needs:     Lack of Transportation (Medical):      Lack of Transportation (Non-Medical):    Physical Activity:     Days of Exercise per Week:     Minutes of Exercise per Session:    Stress:     Feeling of Stress :    Social Connections:     Frequency of Communication with Friends and Family:     Frequency of Social Gatherings with Friends and Family:     Attends Holiness Services:     Active Member of Clubs or Organizations:     Attends Club or Organization Meetings:     Marital Status:    Intimate Partner Violence:     Fear of Current or Ex-Partner:     Emotionally Abused:     Physically Abused:     Sexually Abused:         Current Outpatient Medications:     clindamycin (CLEOCIN) 300 MG capsule, Take 1 capsule (300 mg total) by mouth 4 (four) times a day for 7 days, Disp: 28 capsule, Rfl: 0    diazepam (VALIUM) 5 mg tablet, Take 10 mg by mouth daily at bedtime as needed for anxiety , Disp: , Rfl:     gabapentin (NEURONTIN) 400 mg capsule, Take 400 mg by mouth 3 (three) times a day, Disp: , Rfl:     oxybutynin (DITROPAN-XL) 10 MG 24 hr tablet, Take 10 mg by mouth 2 (two) times a day, Disp: , Rfl:     TiZANidine (ZANAFLEX) 4 MG capsule, Take 4 mg by mouth daily at bedtime, Disp: , Rfl:   No current facility-administered medications for this visit  Review of Systems   Constitutional: Negative for appetite change, chills, fatigue, fever and unexpected weight change  HENT: Negative for congestion, hearing loss, postnasal drip and sinus pressure  Eyes: Negative for discharge and visual disturbance  Respiratory: Negative for cough and shortness of breath  Cardiovascular: Negative for chest pain, palpitations and leg swelling  Gastrointestinal: Negative for abdominal pain, blood in stool, constipation, diarrhea and nausea  Endocrine: Negative  Genitourinary: Negative for difficulty urinating, dysuria and urgency  Musculoskeletal: Positive for gait problem (paraplegia)  Negative for back pain  Skin: Positive for wound (LLE medial aspect, Rt second toe and Rt lateral abrasion)  Negative for rash  Allergic/Immunologic: Negative  Neurological: Negative for dizziness, tremors, seizures, weakness, numbness and headaches  Hematological: Does not bruise/bleed easily  Psychiatric/Behavioral: Negative  Negative for dysphoric mood  The patient is not nervous/anxious  Objective:  /96   Pulse 84   Temp 98 2 °F (36 8 °C)   Resp 20   Ht 5' 11" (1 803 m)   BMI 25 00 kg/m²   Pain Score:   6     Physical Exam  Vitals and nursing note reviewed  Constitutional:       Appearance: Normal appearance  He is well-developed and normal weight  HENT:      Head: Normocephalic and atraumatic  Right Ear: External ear normal       Left Ear: External ear normal       Mouth/Throat:      Comments: masked  Eyes:      General: Lids are normal          Right eye: No discharge  Left eye: No discharge  Conjunctiva/sclera: Conjunctivae normal       Pupils: Pupils are equal, round, and reactive to light  Cardiovascular:      Rate and Rhythm: Normal rate and regular rhythm  Heart sounds: Normal heart sounds  No murmur heard  No friction rub  No gallop  Pulmonary:      Effort: Pulmonary effort is normal       Breath sounds: Normal breath sounds and air entry  Abdominal:      General: Abdomen is flat  Palpations: Abdomen is soft  There is no hepatomegaly or splenomegaly  Tenderness: There is no abdominal tenderness  There is no guarding or rebound  Musculoskeletal:      Cervical back: Neck supple  Right lower leg: Edema present  Left lower leg: Edema present  Feet:    Feet:      Comments: Ulcer on medial aspect of the ankle of LLE with no signs of infection  Slough, exudate appreciated  Rt 2nd toe with slough appreciated  Lymphadenopathy:      Cervical: No cervical adenopathy  Skin:     General: Skin is warm and dry  Findings: Wound present  Neurological:      Mental Status: He is alert and oriented to person, place, and time  Gait: Gait abnormal (paraplegia)  Psychiatric:         Attention and Perception: Attention normal          Mood and Affect: Mood and affect normal          Speech: Speech normal          Behavior: Behavior is cooperative           Cognition and Memory: Cognition normal        Wound 09/27/21 Pressure Injury Ankle Left;Medial (Active)   Wound Image   09/27/21 1045   Wound Description Slough;Pink 09/27/21 0948   Pressure Injury Stage 3 09/27/21 0948   Tory-wound Assessment Pink 09/27/21 0948   Wound Length (cm) 1 2 cm 09/27/21 0948   Wound Width (cm) 1 cm 09/27/21 0948   Wound Depth (cm) 0 1 cm 09/27/21 0948   Wound Surface Area (cm^2) 1 2 cm^2 09/27/21 0948   Wound Volume (cm^3) 0 12 cm^3 09/27/21 0948   Calculated Wound Volume (cm^3) 0 12 cm^3 09/27/21 0948   Drainage Amount Moderate 09/27/21 0948   Drainage Description Brown;Yellow 09/27/21 0948   Non-staged Wound Description Full thickness 09/27/21 0948   Treatments Cleansed 09/27/21 0948   Patient Tolerance Tolerated well 09/27/21 0948       Wound 09/27/21 Leg Right;Lateral (Active)   Wound Image   09/27/21 0953   Wound Description [place] 09/27/21 0953   Tory-wound Assessment Edema;Pink 09/27/21 0953   Wound Length (cm) 0 4 cm 09/27/21 0953   Wound Width (cm) 0 5 cm 09/27/21 0953   Wound Depth (cm) 0 1 cm 09/27/21 0953   Wound Surface Area (cm^2) 0 2 cm^2 09/27/21 0953   Wound Volume (cm^3) 0 02 cm^3 09/27/21 0953   Calculated Wound Volume (cm^3) 0 02 cm^3 09/27/21 0953   Drainage Amount Moderate 09/27/21 0953   Drainage Description Brown;Yellow 09/27/21 0953   Non-staged Wound Description Full thickness 09/27/21 0953   Treatments Cleansed 09/27/21 0953   Patient Tolerance Tolerated well 09/27/21 0953       Wound 09/27/21 Toe (Comment  which one) Anterior;Right (Active)   Wound Image   09/27/21 0954   Wound Description [facility] 09/27/21 0954   Tory-wound Assessment Pink 09/27/21 0954   Wound Length (cm) 0 3 cm 09/27/21 0954   Wound Width (cm) 0 3 cm 09/27/21 0954   Wound Depth (cm) 0 1 cm 09/27/21 0954   Wound Surface Area (cm^2) 0 09 cm^2 09/27/21 0954   Wound Volume (cm^3) 0 009 cm^3 09/27/21 0954   Calculated Wound Volume (cm^3) 0 01 cm^3 09/27/21 0954   Drainage Amount Small 09/27/21 0954   Drainage Description Yellow 09/27/21 0954   Non-staged Wound Description Full thickness 09/27/21 0954   Treatments Cleansed 09/27/21 0954   Patient Tolerance Tolerated well 09/27/21 0954       Debridement   Wound 09/27/21 Pressure Injury Ankle Left;Medial    Universal Protocol:  Consent: Verbal consent obtained  Written consent obtained  Risks and benefits: risks, benefits and alternatives were discussed  Consent given by: patient  Time out: Immediately prior to procedure a "time out" was called to verify the correct patient, procedure, equipment, support staff and site/side marked as required    Patient understanding: patient states understanding of the procedure being performed  Patient identity confirmed: verbally with patient      Performed by: physician  Debridement type: surgical  Level of debridement: subcutaneous tissue  Pain control: lidocaine 4%  Post-debridement measurements  Length (cm): 1 2  Width (cm): 1  Depth (cm): 0 3  Percent debrided: 100%  Surface Area (cm^2): 1 2  Area debrided (cm^2): 1 2  Volume (cm^3): 0 36  Tissue and other material debrided: subcutaneous tissue  Devitalized tissue debrided: exudate, fibrin, necrotic debris and slough  Instrument(s) utilized: curette  Bleeding: small  Hemostasis obtained with: pressure  Procedural pain (0-10): 0  Post-procedural pain: 0   Response to treatment: procedure was tolerated well                   Wound Instructions:  Orders Placed This Encounter   Procedures    Debridement     This order was created via procedure documentation    Debridement     This order was created via procedure documentation    Wound cleansing and dressings     Wash your hands with soap and water  Remove old dressing, discard into plastic bag and place in trash  Cleanse all ulcers with normal saline or soap and water (Dove unscented) prior to applying a clean dressing  Do not use tissue or cotton balls  Do not scrub the wound  Pat dry using gauze  May apply skin prep to skin surrounding wound  Apply Endoform to the left medial ankle ulcer  Cover with allevyn foam  Change dressing three times per week and as needed for excessive drainage or leakage  This was done today  Apply Derma Wound to right later leg and right 2nd toe ulcer the cover with dry dressing change daily  This was done today  Off-loading Instructions:    Keep weight and pressure off wound at all times  , Use Wheelchair Cushion  (Do not use donut-type devices)  Seat lifts or shift position in chair every 15 minutes  and Do Not Sit for Long Periods of Time  Shower yes with protection      Follow up one week     Standing Status:   Future     Standing Expiration Date:   9/27/2022       Total time spent today:  30 minutes  This includes reviewing the patient's chart, pertinent physician records from 12/21/18 ([address] [place] [address]) and  ED on 9/23/21  Teddy Villa MD, CHT, CWS    Portions of the record may have been created with voice recognition software  Occasional wrong word or "sound alike" substitutions may have occurred due to the inherent limitations of voice recognition software  Read the chart carefully and recognize, using context, where substitutions have occurred

## 2021-10-03 ENCOUNTER — HOSPITAL ENCOUNTER (EMERGENCY)
Facility: HOSPITAL | Age: 51
Discharge: HOME | End: 2021-10-03
Attending: EMERGENCY MEDICINE
Payer: COMMERCIAL

## 2021-10-03 VITALS
WEIGHT: 179.23 LBS | RESPIRATION RATE: 18 BRPM | HEART RATE: 99 BPM | HEIGHT: 71 IN | BODY MASS INDEX: 25.09 KG/M2 | SYSTOLIC BLOOD PRESSURE: 195 MMHG | OXYGEN SATURATION: 96 % | DIASTOLIC BLOOD PRESSURE: 97 MMHG | TEMPERATURE: 98.3 F

## 2021-10-03 DIAGNOSIS — Z20.822 ENCOUNTER FOR LABORATORY TESTING FOR COVID-19 VIRUS: Primary | ICD-10-CM

## 2021-10-03 DIAGNOSIS — Z20.822 EXPOSURE TO COVID-19 VIRUS: ICD-10-CM

## 2021-10-03 LAB — SARS-COV-2 RNA RESP QL NAA+PROBE: NEGATIVE

## 2021-10-03 PROCEDURE — U0005 INFEC AGEN DETEC AMPLI PROBE: HCPCS | Performed by: EMERGENCY MEDICINE

## 2021-10-03 PROCEDURE — 99282 EMERGENCY DEPT VISIT SF MDM: CPT

## 2021-10-03 PROCEDURE — U0003 INFECTIOUS AGENT DETECTION BY NUCLEIC ACID (DNA OR RNA); SEVERE ACUTE RESPIRATORY SYNDROME CORONAVIRUS 2 (SARS-COV-2) (CORONAVIRUS DISEASE [COVID-19]), AMPLIFIED PROBE TECHNIQUE, MAKING USE OF HIGH THROUGHPUT TECHNOLOGIES AS DESCRIBED BY CMS-2020-01-R: HCPCS | Performed by: EMERGENCY MEDICINE

## 2021-10-03 PROCEDURE — 99282 EMERGENCY DEPT VISIT SF MDM: CPT | Performed by: PHYSICIAN ASSISTANT

## 2021-10-04 ENCOUNTER — OFFICE VISIT (OUTPATIENT)
Dept: WOUND CARE | Facility: CLINIC | Age: 51
End: 2021-10-04
Payer: OTHER MISCELLANEOUS

## 2021-10-04 VITALS
DIASTOLIC BLOOD PRESSURE: 98 MMHG | SYSTOLIC BLOOD PRESSURE: 141 MMHG | RESPIRATION RATE: 20 BRPM | HEART RATE: 102 BPM | TEMPERATURE: 97.2 F

## 2021-10-04 DIAGNOSIS — I77.6 VASCULITIS (HCC): ICD-10-CM

## 2021-10-04 DIAGNOSIS — E55.9 VITAMIN D DEFICIENCY: ICD-10-CM

## 2021-10-04 DIAGNOSIS — L89.523 PRESSURE INJURY OF LEFT ANKLE, STAGE 3 (HCC): Primary | ICD-10-CM

## 2021-10-04 PROCEDURE — 99214 OFFICE O/P EST MOD 30 MIN: CPT | Performed by: FAMILY MEDICINE

## 2021-10-04 PROCEDURE — 97597 DBRDMT OPN WND 1ST 20 CM/<: CPT | Performed by: FAMILY MEDICINE

## 2021-10-04 PROCEDURE — 87205 SMEAR GRAM STAIN: CPT | Performed by: FAMILY MEDICINE

## 2021-10-04 PROCEDURE — 87070 CULTURE OTHR SPECIMN AEROBIC: CPT | Performed by: FAMILY MEDICINE

## 2021-10-05 ENCOUNTER — APPOINTMENT (EMERGENCY)
Dept: RADIOLOGY | Facility: HOSPITAL | Age: 51
End: 2021-10-05
Payer: COMMERCIAL

## 2021-10-05 ENCOUNTER — HOSPITAL ENCOUNTER (EMERGENCY)
Facility: HOSPITAL | Age: 51
Discharge: HOME | End: 2021-10-05
Attending: EMERGENCY MEDICINE | Admitting: EMERGENCY MEDICINE
Payer: COMMERCIAL

## 2021-10-05 VITALS
RESPIRATION RATE: 21 BRPM | BODY MASS INDEX: 25.2 KG/M2 | OXYGEN SATURATION: 96 % | HEIGHT: 71 IN | WEIGHT: 180 LBS | TEMPERATURE: 99.5 F | SYSTOLIC BLOOD PRESSURE: 192 MMHG | DIASTOLIC BLOOD PRESSURE: 92 MMHG | HEART RATE: 103 BPM

## 2021-10-05 DIAGNOSIS — U07.1 COVID-19: Primary | ICD-10-CM

## 2021-10-05 LAB
ALBUMIN SERPL BCP-MCNC: 4.5 G/DL (ref 3.5–5)
ALP SERPL-CCNC: 71 U/L (ref 46–116)
ALT SERPL W P-5'-P-CCNC: 40 U/L (ref 12–78)
ANION GAP SERPL CALCULATED.3IONS-SCNC: 8 MMOL/L (ref 4–13)
APTT PPP: 30 SECONDS (ref 23–37)
AST SERPL W P-5'-P-CCNC: 23 U/L (ref 5–45)
BASOPHILS # BLD AUTO: 0.03 THOUSANDS/ΜL (ref 0–0.1)
BASOPHILS NFR BLD AUTO: 1 % (ref 0–1)
BILIRUB SERPL-MCNC: 0.34 MG/DL (ref 0.2–1)
BUN SERPL-MCNC: 11 MG/DL (ref 5–25)
CALCIUM SERPL-MCNC: 8.8 MG/DL (ref 8.3–10.1)
CHLORIDE SERPL-SCNC: 98 MMOL/L (ref 100–108)
CO2 SERPL-SCNC: 27 MMOL/L (ref 21–32)
CREAT SERPL-MCNC: 0.96 MG/DL (ref 0.6–1.3)
D DIMER PPP FEU-MCNC: 0.43 UG/ML FEU
EOSINOPHIL # BLD AUTO: 0.02 THOUSAND/ΜL (ref 0–0.61)
EOSINOPHIL NFR BLD AUTO: 0 % (ref 0–6)
ERYTHROCYTE [DISTWIDTH] IN BLOOD BY AUTOMATED COUNT: 12.9 % (ref 11.6–15.1)
GFR SERPL CREATININE-BSD FRML MDRD: 92 ML/MIN/1.73SQ M
GLUCOSE SERPL-MCNC: 98 MG/DL (ref 65–140)
HCT VFR BLD AUTO: 45.7 % (ref 36.5–49.3)
HGB BLD-MCNC: 14.9 G/DL (ref 12–17)
IMM GRANULOCYTES # BLD AUTO: 0.02 THOUSAND/UL (ref 0–0.2)
IMM GRANULOCYTES NFR BLD AUTO: 0 % (ref 0–2)
INR PPP: 1.02 (ref 0.84–1.19)
LACTATE SERPL-SCNC: 1.6 MMOL/L (ref 0.5–2)
LYMPHOCYTES # BLD AUTO: 0.65 THOUSANDS/ΜL (ref 0.6–4.47)
LYMPHOCYTES NFR BLD AUTO: 10 % (ref 14–44)
MCH RBC QN AUTO: 28.1 PG (ref 26.8–34.3)
MCHC RBC AUTO-ENTMCNC: 32.6 G/DL (ref 31.4–37.4)
MCV RBC AUTO: 86 FL (ref 82–98)
MONOCYTES # BLD AUTO: 0.67 THOUSAND/ΜL (ref 0.17–1.22)
MONOCYTES NFR BLD AUTO: 10 % (ref 4–12)
NEUTROPHILS # BLD AUTO: 5.13 THOUSANDS/ΜL (ref 1.85–7.62)
NEUTS SEG NFR BLD AUTO: 79 % (ref 43–75)
NRBC BLD AUTO-RTO: 0 /100 WBCS
PLATELET # BLD AUTO: 237 THOUSANDS/UL (ref 149–390)
PMV BLD AUTO: 10.2 FL (ref 8.9–12.7)
POTASSIUM SERPL-SCNC: 3.8 MMOL/L (ref 3.5–5.3)
PROT SERPL-MCNC: 8.7 G/DL (ref 6.4–8.2)
PROTHROMBIN TIME: 12.9 SECONDS (ref 11.6–14.5)
RBC # BLD AUTO: 5.31 MILLION/UL (ref 3.88–5.62)
SARS-COV-2 RNA RESP QL NAA+PROBE: POSITIVE
SODIUM SERPL-SCNC: 133 MMOL/L (ref 136–145)
TROPONIN I SERPL-MCNC: <0.02 NG/ML
WBC # BLD AUTO: 6.52 THOUSAND/UL (ref 4.31–10.16)

## 2021-10-05 PROCEDURE — 85610 PROTHROMBIN TIME: CPT | Performed by: PHYSICIAN ASSISTANT

## 2021-10-05 PROCEDURE — 85025 COMPLETE CBC W/AUTO DIFF WBC: CPT | Performed by: PHYSICIAN ASSISTANT

## 2021-10-05 PROCEDURE — 99285 EMERGENCY DEPT VISIT HI MDM: CPT | Performed by: PHYSICIAN ASSISTANT

## 2021-10-05 PROCEDURE — 87040 BLOOD CULTURE FOR BACTERIA: CPT | Performed by: PHYSICIAN ASSISTANT

## 2021-10-05 PROCEDURE — 96361 HYDRATE IV INFUSION ADD-ON: CPT

## 2021-10-05 PROCEDURE — 85730 THROMBOPLASTIN TIME PARTIAL: CPT | Performed by: PHYSICIAN ASSISTANT

## 2021-10-05 PROCEDURE — 36415 COLL VENOUS BLD VENIPUNCTURE: CPT | Performed by: PHYSICIAN ASSISTANT

## 2021-10-05 PROCEDURE — 96360 HYDRATION IV INFUSION INIT: CPT

## 2021-10-05 PROCEDURE — 85379 FIBRIN DEGRADATION QUANT: CPT | Performed by: PHYSICIAN ASSISTANT

## 2021-10-05 PROCEDURE — 99285 EMERGENCY DEPT VISIT HI MDM: CPT

## 2021-10-05 PROCEDURE — U0003 INFECTIOUS AGENT DETECTION BY NUCLEIC ACID (DNA OR RNA); SEVERE ACUTE RESPIRATORY SYNDROME CORONAVIRUS 2 (SARS-COV-2) (CORONAVIRUS DISEASE [COVID-19]), AMPLIFIED PROBE TECHNIQUE, MAKING USE OF HIGH THROUGHPUT TECHNOLOGIES AS DESCRIBED BY CMS-2020-01-R: HCPCS | Performed by: PHYSICIAN ASSISTANT

## 2021-10-05 PROCEDURE — 83605 ASSAY OF LACTIC ACID: CPT | Performed by: PHYSICIAN ASSISTANT

## 2021-10-05 PROCEDURE — U0005 INFEC AGEN DETEC AMPLI PROBE: HCPCS | Performed by: PHYSICIAN ASSISTANT

## 2021-10-05 PROCEDURE — 84484 ASSAY OF TROPONIN QUANT: CPT | Performed by: PHYSICIAN ASSISTANT

## 2021-10-05 PROCEDURE — 93005 ELECTROCARDIOGRAM TRACING: CPT

## 2021-10-05 PROCEDURE — 71045 X-RAY EXAM CHEST 1 VIEW: CPT

## 2021-10-05 PROCEDURE — 80053 COMPREHEN METABOLIC PANEL: CPT | Performed by: PHYSICIAN ASSISTANT

## 2021-10-05 RX ORDER — ALBUTEROL SULFATE 90 UG/1
2 AEROSOL, METERED RESPIRATORY (INHALATION) EVERY 6 HOURS PRN
Qty: 18 G | Refills: 0 | Status: SHIPPED | OUTPATIENT
Start: 2021-10-05 | End: 2021-10-05 | Stop reason: SDUPTHER

## 2021-10-05 RX ORDER — IBUPROFEN 400 MG/1
400 TABLET ORAL ONCE
Status: COMPLETED | OUTPATIENT
Start: 2021-10-05 | End: 2021-10-05

## 2021-10-05 RX ORDER — ALBUTEROL SULFATE 90 UG/1
2 AEROSOL, METERED RESPIRATORY (INHALATION) EVERY 6 HOURS PRN
Qty: 18 G | Refills: 0 | Status: SHIPPED | OUTPATIENT
Start: 2021-10-05 | End: 2022-06-21

## 2021-10-05 RX ORDER — IBUPROFEN 400 MG/1
400 TABLET ORAL EVERY 6 HOURS PRN
Qty: 30 TABLET | Refills: 0 | Status: SHIPPED | OUTPATIENT
Start: 2021-10-05 | End: 2022-06-21

## 2021-10-05 RX ORDER — IBUPROFEN 400 MG/1
400 TABLET ORAL EVERY 6 HOURS PRN
Qty: 30 TABLET | Refills: 0 | Status: SHIPPED | OUTPATIENT
Start: 2021-10-05 | End: 2021-10-05 | Stop reason: SDUPTHER

## 2021-10-05 RX ADMIN — SODIUM CHLORIDE 1000 ML: 0.9 INJECTION, SOLUTION INTRAVENOUS at 16:34

## 2021-10-05 RX ADMIN — IBUPROFEN 400 MG: 400 TABLET ORAL at 16:47

## 2021-10-06 LAB
ATRIAL RATE: 95 BPM
P AXIS: 80 DEGREES
PR INTERVAL: 134 MS
QRS AXIS: 83 DEGREES
QRSD INTERVAL: 84 MS
QT INTERVAL: 326 MS
QTC INTERVAL: 409 MS
T WAVE AXIS: 55 DEGREES
VENTRICULAR RATE: 95 BPM

## 2021-10-06 PROCEDURE — 93010 ELECTROCARDIOGRAM REPORT: CPT | Performed by: INTERNAL MEDICINE

## 2021-10-07 LAB
BACTERIA WND AEROBE CULT: NO GROWTH
GRAM STN SPEC: NORMAL

## 2021-10-10 LAB
BACTERIA BLD CULT: NORMAL
BACTERIA BLD CULT: NORMAL

## 2021-10-11 ENCOUNTER — HOSPITAL ENCOUNTER (INPATIENT)
Facility: HOSPITAL | Age: 51
LOS: 3 days | Discharge: HOME | DRG: 177 | End: 2021-10-14
Attending: EMERGENCY MEDICINE | Admitting: INTERNAL MEDICINE
Payer: COMMERCIAL

## 2021-10-11 ENCOUNTER — APPOINTMENT (EMERGENCY)
Dept: CT IMAGING | Facility: HOSPITAL | Age: 51
DRG: 177 | End: 2021-10-11
Payer: COMMERCIAL

## 2021-10-11 DIAGNOSIS — R06.00 DYSPNEA: Primary | ICD-10-CM

## 2021-10-11 DIAGNOSIS — R09.02 HYPOXIA: ICD-10-CM

## 2021-10-11 DIAGNOSIS — J12.82 PNEUMONIA DUE TO COVID-19 VIRUS: ICD-10-CM

## 2021-10-11 DIAGNOSIS — Z86.718 HISTORY OF DVT (DEEP VEIN THROMBOSIS): ICD-10-CM

## 2021-10-11 DIAGNOSIS — U07.1 PNEUMONIA DUE TO COVID-19 VIRUS: ICD-10-CM

## 2021-10-11 LAB
ANION GAP SERPL CALCULATED.3IONS-SCNC: 12 MMOL/L (ref 4–13)
BASOPHILS # BLD AUTO: 0.01 THOUSANDS/ΜL (ref 0–0.1)
BASOPHILS NFR BLD AUTO: 0 % (ref 0–1)
BUN SERPL-MCNC: 8 MG/DL (ref 5–25)
CALCIUM SERPL-MCNC: 8.2 MG/DL (ref 8.3–10.1)
CHLORIDE SERPL-SCNC: 101 MMOL/L (ref 100–108)
CO2 SERPL-SCNC: 25 MMOL/L (ref 21–32)
CREAT SERPL-MCNC: 0.89 MG/DL (ref 0.6–1.3)
EOSINOPHIL # BLD AUTO: 0.01 THOUSAND/ΜL (ref 0–0.61)
EOSINOPHIL NFR BLD AUTO: 0 % (ref 0–6)
ERYTHROCYTE [DISTWIDTH] IN BLOOD BY AUTOMATED COUNT: 13.2 % (ref 11.6–15.1)
GFR SERPL CREATININE-BSD FRML MDRD: 100 ML/MIN/1.73SQ M
GLUCOSE SERPL-MCNC: 81 MG/DL (ref 65–140)
HCT VFR BLD AUTO: 42.4 % (ref 36.5–49.3)
HGB BLD-MCNC: 13.8 G/DL (ref 12–17)
IMM GRANULOCYTES # BLD AUTO: 0.02 THOUSAND/UL (ref 0–0.2)
IMM GRANULOCYTES NFR BLD AUTO: 0 % (ref 0–2)
LYMPHOCYTES # BLD AUTO: 0.58 THOUSANDS/ΜL (ref 0.6–4.47)
LYMPHOCYTES NFR BLD AUTO: 12 % (ref 14–44)
MCH RBC QN AUTO: 28 PG (ref 26.8–34.3)
MCHC RBC AUTO-ENTMCNC: 32.5 G/DL (ref 31.4–37.4)
MCV RBC AUTO: 86 FL (ref 82–98)
MONOCYTES # BLD AUTO: 0.27 THOUSAND/ΜL (ref 0.17–1.22)
MONOCYTES NFR BLD AUTO: 6 % (ref 4–12)
NEUTROPHILS # BLD AUTO: 3.83 THOUSANDS/ΜL (ref 1.85–7.62)
NEUTS SEG NFR BLD AUTO: 82 % (ref 43–75)
NRBC BLD AUTO-RTO: 0 /100 WBCS
PLATELET # BLD AUTO: 190 THOUSANDS/UL (ref 149–390)
PMV BLD AUTO: 9.4 FL (ref 8.9–12.7)
POTASSIUM SERPL-SCNC: 4 MMOL/L (ref 3.5–5.3)
RBC # BLD AUTO: 4.93 MILLION/UL (ref 3.88–5.62)
SODIUM SERPL-SCNC: 138 MMOL/L (ref 136–145)
WBC # BLD AUTO: 4.72 THOUSAND/UL (ref 4.31–10.16)

## 2021-10-11 PROCEDURE — 85025 COMPLETE CBC W/AUTO DIFF WBC: CPT | Performed by: EMERGENCY MEDICINE

## 2021-10-11 PROCEDURE — 82550 ASSAY OF CK (CPK): CPT | Performed by: PHYSICIAN ASSISTANT

## 2021-10-11 PROCEDURE — 83880 ASSAY OF NATRIURETIC PEPTIDE: CPT | Performed by: PHYSICIAN ASSISTANT

## 2021-10-11 PROCEDURE — 99222 1ST HOSP IP/OBS MODERATE 55: CPT | Performed by: PHYSICIAN ASSISTANT

## 2021-10-11 PROCEDURE — 96365 THER/PROPH/DIAG IV INF INIT: CPT

## 2021-10-11 PROCEDURE — 86901 BLOOD TYPING SEROLOGIC RH(D): CPT | Performed by: PHYSICIAN ASSISTANT

## 2021-10-11 PROCEDURE — 36415 COLL VENOUS BLD VENIPUNCTURE: CPT | Performed by: EMERGENCY MEDICINE

## 2021-10-11 PROCEDURE — 99285 EMERGENCY DEPT VISIT HI MDM: CPT | Performed by: EMERGENCY MEDICINE

## 2021-10-11 PROCEDURE — 86900 BLOOD TYPING SEROLOGIC ABO: CPT | Performed by: PHYSICIAN ASSISTANT

## 2021-10-11 PROCEDURE — G1004 CDSM NDSC: HCPCS

## 2021-10-11 PROCEDURE — 99285 EMERGENCY DEPT VISIT HI MDM: CPT

## 2021-10-11 PROCEDURE — 71275 CT ANGIOGRAPHY CHEST: CPT

## 2021-10-11 PROCEDURE — 80048 BASIC METABOLIC PNL TOTAL CA: CPT | Performed by: EMERGENCY MEDICINE

## 2021-10-11 PROCEDURE — 84484 ASSAY OF TROPONIN QUANT: CPT | Performed by: PHYSICIAN ASSISTANT

## 2021-10-11 PROCEDURE — 86140 C-REACTIVE PROTEIN: CPT | Performed by: PHYSICIAN ASSISTANT

## 2021-10-11 RX ORDER — GABAPENTIN 400 MG/1
400 CAPSULE ORAL 3 TIMES DAILY
Status: DISCONTINUED | OUTPATIENT
Start: 2021-10-11 | End: 2021-10-12

## 2021-10-11 RX ORDER — OXYBUTYNIN CHLORIDE 5 MG/1
10 TABLET, EXTENDED RELEASE ORAL
Status: DISCONTINUED | OUTPATIENT
Start: 2021-10-11 | End: 2021-10-14 | Stop reason: HOSPADM

## 2021-10-11 RX ORDER — ACETAMINOPHEN 325 MG/1
650 TABLET ORAL ONCE
Status: COMPLETED | OUTPATIENT
Start: 2021-10-11 | End: 2021-10-11

## 2021-10-11 RX ORDER — CALCIUM CARBONATE 200(500)MG
1000 TABLET,CHEWABLE ORAL DAILY PRN
Status: DISCONTINUED | OUTPATIENT
Start: 2021-10-11 | End: 2021-10-14 | Stop reason: HOSPADM

## 2021-10-11 RX ORDER — DIAZEPAM 5 MG/1
10 TABLET ORAL
Status: DISCONTINUED | OUTPATIENT
Start: 2021-10-11 | End: 2021-10-14 | Stop reason: HOSPADM

## 2021-10-11 RX ORDER — DEXAMETHASONE SODIUM PHOSPHATE 4 MG/ML
6 INJECTION, SOLUTION INTRA-ARTICULAR; INTRALESIONAL; INTRAMUSCULAR; INTRAVENOUS; SOFT TISSUE EVERY 24 HOURS
Status: DISCONTINUED | OUTPATIENT
Start: 2021-10-11 | End: 2021-10-14 | Stop reason: HOSPADM

## 2021-10-11 RX ORDER — ONDANSETRON 2 MG/ML
4 INJECTION INTRAMUSCULAR; INTRAVENOUS EVERY 6 HOURS PRN
Status: DISCONTINUED | OUTPATIENT
Start: 2021-10-11 | End: 2021-10-14 | Stop reason: HOSPADM

## 2021-10-11 RX ORDER — ALBUTEROL SULFATE 90 UG/1
2 AEROSOL, METERED RESPIRATORY (INHALATION) EVERY 6 HOURS PRN
Status: DISCONTINUED | OUTPATIENT
Start: 2021-10-11 | End: 2021-10-14 | Stop reason: HOSPADM

## 2021-10-11 RX ORDER — TIZANIDINE 4 MG/1
4 TABLET ORAL
Status: DISCONTINUED | OUTPATIENT
Start: 2021-10-11 | End: 2021-10-14 | Stop reason: HOSPADM

## 2021-10-11 RX ORDER — IBUPROFEN 400 MG/1
400 TABLET ORAL EVERY 6 HOURS PRN
Status: DISCONTINUED | OUTPATIENT
Start: 2021-10-11 | End: 2021-10-14 | Stop reason: HOSPADM

## 2021-10-11 RX ADMIN — GABAPENTIN 400 MG: 400 CAPSULE ORAL at 23:30

## 2021-10-11 RX ADMIN — OXYBUTYNIN CHLORIDE 10 MG: 5 TABLET, EXTENDED RELEASE ORAL at 23:30

## 2021-10-11 RX ADMIN — ACETAMINOPHEN 650 MG: 325 TABLET, FILM COATED ORAL at 21:26

## 2021-10-11 RX ADMIN — DEXAMETHASONE SODIUM PHOSPHATE 6 MG: 4 INJECTION, SOLUTION INTRAMUSCULAR; INTRAVENOUS at 23:30

## 2021-10-11 RX ADMIN — IOHEXOL 100 ML: 350 INJECTION, SOLUTION INTRAVENOUS at 19:52

## 2021-10-11 RX ADMIN — TIZANIDINE 4 MG: 4 TABLET ORAL at 23:30

## 2021-10-11 RX ADMIN — SODIUM CHLORIDE, SODIUM LACTATE, POTASSIUM CHLORIDE, AND CALCIUM CHLORIDE 1000 ML: .6; .31; .03; .02 INJECTION, SOLUTION INTRAVENOUS at 18:52

## 2021-10-12 PROBLEM — J96.01 ACUTE RESPIRATORY FAILURE WITH HYPOXIA (HCC): Status: ACTIVE | Noted: 2021-10-12

## 2021-10-12 LAB
ABO GROUP BLD: NORMAL
ALBUMIN SERPL BCP-MCNC: 2.6 G/DL (ref 3.5–5)
ALP SERPL-CCNC: 57 U/L (ref 46–116)
ALT SERPL W P-5'-P-CCNC: 35 U/L (ref 12–78)
ANION GAP SERPL CALCULATED.3IONS-SCNC: 11 MMOL/L (ref 4–13)
AST SERPL W P-5'-P-CCNC: 46 U/L (ref 5–45)
BASOPHILS # BLD AUTO: 0.01 THOUSANDS/ΜL (ref 0–0.1)
BASOPHILS NFR BLD AUTO: 0 % (ref 0–1)
BILIRUB SERPL-MCNC: 0.42 MG/DL (ref 0.2–1)
BUN SERPL-MCNC: 9 MG/DL (ref 5–25)
CALCIUM ALBUM COR SERPL-MCNC: 8.7 MG/DL (ref 8.3–10.1)
CALCIUM SERPL-MCNC: 7.6 MG/DL (ref 8.3–10.1)
CHLORIDE SERPL-SCNC: 102 MMOL/L (ref 100–108)
CK SERPL-CCNC: 88 U/L (ref 39–308)
CO2 SERPL-SCNC: 25 MMOL/L (ref 21–32)
CREAT SERPL-MCNC: 0.84 MG/DL (ref 0.6–1.3)
CRP SERPL QL: 143.3 MG/L
EOSINOPHIL # BLD AUTO: 0.01 THOUSAND/ΜL (ref 0–0.61)
EOSINOPHIL NFR BLD AUTO: 0 % (ref 0–6)
ERYTHROCYTE [DISTWIDTH] IN BLOOD BY AUTOMATED COUNT: 13.2 % (ref 11.6–15.1)
GFR SERPL CREATININE-BSD FRML MDRD: 102 ML/MIN/1.73SQ M
GLUCOSE SERPL-MCNC: 116 MG/DL (ref 65–140)
HCT VFR BLD AUTO: 37.6 % (ref 36.5–49.3)
HGB BLD-MCNC: 12.2 G/DL (ref 12–17)
IMM GRANULOCYTES # BLD AUTO: 0.03 THOUSAND/UL (ref 0–0.2)
IMM GRANULOCYTES NFR BLD AUTO: 1 % (ref 0–2)
LYMPHOCYTES # BLD AUTO: 0.79 THOUSANDS/ΜL (ref 0.6–4.47)
LYMPHOCYTES NFR BLD AUTO: 17 % (ref 14–44)
MCH RBC QN AUTO: 27.7 PG (ref 26.8–34.3)
MCHC RBC AUTO-ENTMCNC: 32.4 G/DL (ref 31.4–37.4)
MCV RBC AUTO: 86 FL (ref 82–98)
MONOCYTES # BLD AUTO: 0.28 THOUSAND/ΜL (ref 0.17–1.22)
MONOCYTES NFR BLD AUTO: 6 % (ref 4–12)
NEUTROPHILS # BLD AUTO: 3.6 THOUSANDS/ΜL (ref 1.85–7.62)
NEUTS SEG NFR BLD AUTO: 76 % (ref 43–75)
NRBC BLD AUTO-RTO: 0 /100 WBCS
NT-PROBNP SERPL-MCNC: 54 PG/ML
PLATELET # BLD AUTO: 193 THOUSANDS/UL (ref 149–390)
PMV BLD AUTO: 9.9 FL (ref 8.9–12.7)
POTASSIUM SERPL-SCNC: 3.4 MMOL/L (ref 3.5–5.3)
PROT SERPL-MCNC: 6.4 G/DL (ref 6.4–8.2)
RBC # BLD AUTO: 4.4 MILLION/UL (ref 3.88–5.62)
RH BLD: POSITIVE
SODIUM SERPL-SCNC: 138 MMOL/L (ref 136–145)
TROPONIN I SERPL-MCNC: <0.02 NG/ML
WBC # BLD AUTO: 4.72 THOUSAND/UL (ref 4.31–10.16)

## 2021-10-12 PROCEDURE — 80053 COMPREHEN METABOLIC PANEL: CPT | Performed by: PHYSICIAN ASSISTANT

## 2021-10-12 PROCEDURE — 84145 PROCALCITONIN (PCT): CPT | Performed by: PHYSICIAN ASSISTANT

## 2021-10-12 PROCEDURE — XW033E5 INTRODUCTION OF REMDESIVIR ANTI-INFECTIVE INTO PERIPHERAL VEIN, PERCUTANEOUS APPROACH, NEW TECHNOLOGY GROUP 5: ICD-10-PCS | Performed by: INTERNAL MEDICINE

## 2021-10-12 PROCEDURE — 99232 SBSQ HOSP IP/OBS MODERATE 35: CPT | Performed by: PHYSICIAN ASSISTANT

## 2021-10-12 PROCEDURE — 85025 COMPLETE CBC W/AUTO DIFF WBC: CPT | Performed by: PHYSICIAN ASSISTANT

## 2021-10-12 RX ORDER — ACETAMINOPHEN 325 MG/1
975 TABLET ORAL EVERY 8 HOURS PRN
Status: DISCONTINUED | OUTPATIENT
Start: 2021-10-12 | End: 2021-10-14 | Stop reason: HOSPADM

## 2021-10-12 RX ORDER — ACETAMINOPHEN 325 MG/1
975 TABLET ORAL EVERY 8 HOURS PRN
Status: DISCONTINUED | OUTPATIENT
Start: 2021-10-12 | End: 2021-10-12

## 2021-10-12 RX ORDER — GABAPENTIN 400 MG/1
400 CAPSULE ORAL
Status: DISCONTINUED | OUTPATIENT
Start: 2021-10-12 | End: 2021-10-14 | Stop reason: HOSPADM

## 2021-10-12 RX ORDER — GABAPENTIN 400 MG/1
800 CAPSULE ORAL 2 TIMES DAILY
Status: DISCONTINUED | OUTPATIENT
Start: 2021-10-12 | End: 2021-10-14 | Stop reason: HOSPADM

## 2021-10-12 RX ORDER — ASCORBIC ACID 500 MG
1000 TABLET ORAL 2 TIMES DAILY
Status: DISCONTINUED | OUTPATIENT
Start: 2021-10-12 | End: 2021-10-14 | Stop reason: HOSPADM

## 2021-10-12 RX ORDER — MELATONIN
2000 DAILY
Status: DISCONTINUED | OUTPATIENT
Start: 2021-10-12 | End: 2021-10-14 | Stop reason: HOSPADM

## 2021-10-12 RX ORDER — GUAIFENESIN 600 MG
600 TABLET, EXTENDED RELEASE 12 HR ORAL EVERY 12 HOURS SCHEDULED
Status: DISCONTINUED | OUTPATIENT
Start: 2021-10-12 | End: 2021-10-14 | Stop reason: HOSPADM

## 2021-10-12 RX ORDER — POTASSIUM CHLORIDE 20 MEQ/1
20 TABLET, EXTENDED RELEASE ORAL ONCE
Status: COMPLETED | OUTPATIENT
Start: 2021-10-12 | End: 2021-10-12

## 2021-10-12 RX ORDER — ZINC SULFATE 50(220)MG
220 CAPSULE ORAL 2 TIMES DAILY
Status: DISCONTINUED | OUTPATIENT
Start: 2021-10-12 | End: 2021-10-14 | Stop reason: HOSPADM

## 2021-10-12 RX ADMIN — ENOXAPARIN SODIUM 80 MG: 80 INJECTION SUBCUTANEOUS at 09:20

## 2021-10-12 RX ADMIN — ACETAMINOPHEN 975 MG: 325 TABLET, FILM COATED ORAL at 09:18

## 2021-10-12 RX ADMIN — OXYCODONE HYDROCHLORIDE AND ACETAMINOPHEN 1000 MG: 500 TABLET ORAL at 18:53

## 2021-10-12 RX ADMIN — DIAZEPAM 10 MG: 5 TABLET ORAL at 00:06

## 2021-10-12 RX ADMIN — ACETAMINOPHEN 975 MG: 325 TABLET, FILM COATED ORAL at 17:24

## 2021-10-12 RX ADMIN — GUAIFENESIN 600 MG: 600 TABLET ORAL at 22:10

## 2021-10-12 RX ADMIN — OXYBUTYNIN CHLORIDE 10 MG: 5 TABLET, EXTENDED RELEASE ORAL at 22:10

## 2021-10-12 RX ADMIN — REMDESIVIR 100 MG: 100 INJECTION, POWDER, LYOPHILIZED, FOR SOLUTION INTRAVENOUS at 22:20

## 2021-10-12 RX ADMIN — TIZANIDINE 4 MG: 4 TABLET ORAL at 22:10

## 2021-10-12 RX ADMIN — POTASSIUM CHLORIDE 20 MEQ: 1500 TABLET, EXTENDED RELEASE ORAL at 09:18

## 2021-10-12 RX ADMIN — GABAPENTIN 800 MG: 400 CAPSULE ORAL at 17:24

## 2021-10-12 RX ADMIN — Medication 2000 UNITS: at 18:53

## 2021-10-12 RX ADMIN — DIAZEPAM 10 MG: 5 TABLET ORAL at 22:25

## 2021-10-12 RX ADMIN — GABAPENTIN 400 MG: 400 CAPSULE ORAL at 09:18

## 2021-10-12 RX ADMIN — GABAPENTIN 400 MG: 400 CAPSULE ORAL at 22:09

## 2021-10-12 RX ADMIN — REMDESIVIR 200 MG: 100 INJECTION, POWDER, LYOPHILIZED, FOR SOLUTION INTRAVENOUS at 00:16

## 2021-10-12 RX ADMIN — DEXAMETHASONE SODIUM PHOSPHATE 6 MG: 4 INJECTION, SOLUTION INTRAMUSCULAR; INTRAVENOUS at 22:25

## 2021-10-12 RX ADMIN — ZINC SULFATE 220 MG (50 MG) CAPSULE 220 MG: CAPSULE at 18:53

## 2021-10-13 LAB
ALBUMIN SERPL BCP-MCNC: 3 G/DL (ref 3.5–5)
ALP SERPL-CCNC: 68 U/L (ref 46–116)
ALT SERPL W P-5'-P-CCNC: 40 U/L (ref 12–78)
ANION GAP SERPL CALCULATED.3IONS-SCNC: 14 MMOL/L (ref 4–13)
AST SERPL W P-5'-P-CCNC: 44 U/L (ref 5–45)
BACTERIA UR QL AUTO: NORMAL /HPF
BASOPHILS # BLD AUTO: 0.01 THOUSANDS/ΜL (ref 0–0.1)
BASOPHILS NFR BLD AUTO: 0 % (ref 0–1)
BILIRUB SERPL-MCNC: 0.62 MG/DL (ref 0.2–1)
BILIRUB UR QL STRIP: NEGATIVE
BUN SERPL-MCNC: 15 MG/DL (ref 5–25)
CALCIUM ALBUM COR SERPL-MCNC: 9.4 MG/DL (ref 8.3–10.1)
CALCIUM SERPL-MCNC: 8.6 MG/DL (ref 8.3–10.1)
CHLORIDE SERPL-SCNC: 101 MMOL/L (ref 100–108)
CLARITY UR: CLEAR
CO2 SERPL-SCNC: 26 MMOL/L (ref 21–32)
COLOR UR: YELLOW
CREAT SERPL-MCNC: 0.75 MG/DL (ref 0.6–1.3)
EOSINOPHIL # BLD AUTO: 0 THOUSAND/ΜL (ref 0–0.61)
EOSINOPHIL NFR BLD AUTO: 0 % (ref 0–6)
ERYTHROCYTE [DISTWIDTH] IN BLOOD BY AUTOMATED COUNT: 13.2 % (ref 11.6–15.1)
GFR SERPL CREATININE-BSD FRML MDRD: 107 ML/MIN/1.73SQ M
GLUCOSE SERPL-MCNC: 119 MG/DL (ref 65–140)
GLUCOSE UR STRIP-MCNC: NEGATIVE MG/DL
HCT VFR BLD AUTO: 42.5 % (ref 36.5–49.3)
HGB BLD-MCNC: 13.8 G/DL (ref 12–17)
HGB UR QL STRIP.AUTO: NEGATIVE
KETONES UR STRIP-MCNC: ABNORMAL MG/DL
LEUKOCYTE ESTERASE UR QL STRIP: NEGATIVE
LYMPHOCYTES # BLD AUTO: 0.52 THOUSANDS/ΜL (ref 0.6–4.47)
LYMPHOCYTES NFR BLD AUTO: 16 % (ref 14–44)
MCH RBC QN AUTO: 27.8 PG (ref 26.8–34.3)
MCHC RBC AUTO-ENTMCNC: 32.5 G/DL (ref 31.4–37.4)
MCV RBC AUTO: 86 FL (ref 82–98)
MONOCYTES # BLD AUTO: 0.16 THOUSAND/ΜL (ref 0.17–1.22)
MONOCYTES NFR BLD AUTO: 5 % (ref 4–12)
NEUTROPHILS # BLD AUTO: 2.6 THOUSANDS/ΜL (ref 1.85–7.62)
NEUTS SEG NFR BLD AUTO: 78 % (ref 43–75)
NITRITE UR QL STRIP: NEGATIVE
NON-SQ EPI CELLS URNS QL MICRO: NORMAL /HPF
PH UR STRIP.AUTO: 6 [PH]
PLATELET # BLD AUTO: 206 THOUSANDS/UL (ref 149–390)
PMV BLD AUTO: 9.9 FL (ref 8.9–12.7)
POTASSIUM SERPL-SCNC: 4.1 MMOL/L (ref 3.5–5.3)
PROCALCITONIN SERPL-MCNC: <0.05 NG/ML
PROCALCITONIN SERPL-MCNC: <0.05 NG/ML
PROT SERPL-MCNC: 7.3 G/DL (ref 6.4–8.2)
PROT UR STRIP-MCNC: ABNORMAL MG/DL
RBC # BLD AUTO: 4.97 MILLION/UL (ref 3.88–5.62)
RBC #/AREA URNS AUTO: NORMAL /HPF
SODIUM SERPL-SCNC: 141 MMOL/L (ref 136–145)
SP GR UR STRIP.AUTO: 1.02 (ref 1–1.03)
UROBILINOGEN UR QL STRIP.AUTO: 0.2 E.U./DL
WBC # BLD AUTO: 3.34 THOUSAND/UL (ref 4.31–10.16)
WBC #/AREA URNS AUTO: NORMAL /HPF

## 2021-10-13 PROCEDURE — 85025 COMPLETE CBC W/AUTO DIFF WBC: CPT | Performed by: PHYSICIAN ASSISTANT

## 2021-10-13 PROCEDURE — 99232 SBSQ HOSP IP/OBS MODERATE 35: CPT | Performed by: PHYSICIAN ASSISTANT

## 2021-10-13 PROCEDURE — 81001 URINALYSIS AUTO W/SCOPE: CPT | Performed by: PHYSICIAN ASSISTANT

## 2021-10-13 PROCEDURE — 80053 COMPREHEN METABOLIC PANEL: CPT | Performed by: PHYSICIAN ASSISTANT

## 2021-10-13 PROCEDURE — 84145 PROCALCITONIN (PCT): CPT | Performed by: PHYSICIAN ASSISTANT

## 2021-10-13 RX ADMIN — OXYCODONE HYDROCHLORIDE AND ACETAMINOPHEN 1000 MG: 500 TABLET ORAL at 17:42

## 2021-10-13 RX ADMIN — GABAPENTIN 800 MG: 400 CAPSULE ORAL at 10:03

## 2021-10-13 RX ADMIN — ZINC SULFATE 220 MG (50 MG) CAPSULE 220 MG: CAPSULE at 10:00

## 2021-10-13 RX ADMIN — Medication 2000 UNITS: at 09:59

## 2021-10-13 RX ADMIN — GABAPENTIN 800 MG: 400 CAPSULE ORAL at 15:54

## 2021-10-13 RX ADMIN — GUAIFENESIN 600 MG: 600 TABLET ORAL at 22:30

## 2021-10-13 RX ADMIN — OXYCODONE HYDROCHLORIDE AND ACETAMINOPHEN 1000 MG: 500 TABLET ORAL at 10:02

## 2021-10-13 RX ADMIN — GABAPENTIN 400 MG: 400 CAPSULE ORAL at 22:09

## 2021-10-13 RX ADMIN — OXYBUTYNIN CHLORIDE 10 MG: 5 TABLET, EXTENDED RELEASE ORAL at 22:09

## 2021-10-13 RX ADMIN — GUAIFENESIN 600 MG: 600 TABLET ORAL at 10:00

## 2021-10-13 RX ADMIN — ZINC SULFATE 220 MG (50 MG) CAPSULE 220 MG: CAPSULE at 17:42

## 2021-10-13 RX ADMIN — APIXABAN 2.5 MG: 2.5 TABLET, FILM COATED ORAL at 22:09

## 2021-10-13 RX ADMIN — ONDANSETRON 4 MG: 2 INJECTION INTRAMUSCULAR; INTRAVENOUS at 20:22

## 2021-10-13 RX ADMIN — TIZANIDINE 4 MG: 4 TABLET ORAL at 22:09

## 2021-10-13 RX ADMIN — DIAZEPAM 10 MG: 5 TABLET ORAL at 22:24

## 2021-10-14 VITALS
RESPIRATION RATE: 18 BRPM | SYSTOLIC BLOOD PRESSURE: 120 MMHG | OXYGEN SATURATION: 92 % | BODY MASS INDEX: 25.19 KG/M2 | HEIGHT: 71 IN | WEIGHT: 179.9 LBS | DIASTOLIC BLOOD PRESSURE: 90 MMHG | TEMPERATURE: 98.5 F | HEART RATE: 85 BPM

## 2021-10-14 LAB
ANION GAP SERPL CALCULATED.3IONS-SCNC: 6 MMOL/L (ref 4–13)
BASOPHILS # BLD AUTO: 0.01 THOUSANDS/ΜL (ref 0–0.1)
BASOPHILS NFR BLD AUTO: 0 % (ref 0–1)
BUN SERPL-MCNC: 20 MG/DL (ref 5–25)
CALCIUM SERPL-MCNC: 8.4 MG/DL (ref 8.3–10.1)
CHLORIDE SERPL-SCNC: 99 MMOL/L (ref 100–108)
CO2 SERPL-SCNC: 29 MMOL/L (ref 21–32)
CREAT SERPL-MCNC: 0.88 MG/DL (ref 0.6–1.3)
CRP SERPL QL: 76.3 MG/L
EOSINOPHIL # BLD AUTO: 0.01 THOUSAND/ΜL (ref 0–0.61)
EOSINOPHIL NFR BLD AUTO: 0 % (ref 0–6)
ERYTHROCYTE [DISTWIDTH] IN BLOOD BY AUTOMATED COUNT: 13.2 % (ref 11.6–15.1)
GFR SERPL CREATININE-BSD FRML MDRD: 100 ML/MIN/1.73SQ M
GLUCOSE SERPL-MCNC: 164 MG/DL (ref 65–140)
HCT VFR BLD AUTO: 42.1 % (ref 36.5–49.3)
HGB BLD-MCNC: 13.5 G/DL (ref 12–17)
IMM GRANULOCYTES # BLD AUTO: 0.09 THOUSAND/UL (ref 0–0.2)
IMM GRANULOCYTES NFR BLD AUTO: 1 % (ref 0–2)
LYMPHOCYTES # BLD AUTO: 0.9 THOUSANDS/ΜL (ref 0.6–4.47)
LYMPHOCYTES NFR BLD AUTO: 14 % (ref 14–44)
MCH RBC QN AUTO: 27.3 PG (ref 26.8–34.3)
MCHC RBC AUTO-ENTMCNC: 32.1 G/DL (ref 31.4–37.4)
MCV RBC AUTO: 85 FL (ref 82–98)
MONOCYTES # BLD AUTO: 0.35 THOUSAND/ΜL (ref 0.17–1.22)
MONOCYTES NFR BLD AUTO: 6 % (ref 4–12)
NEUTROPHILS # BLD AUTO: 5.04 THOUSANDS/ΜL (ref 1.85–7.62)
NEUTS SEG NFR BLD AUTO: 79 % (ref 43–75)
NRBC BLD AUTO-RTO: 0 /100 WBCS
PLATELET # BLD AUTO: 211 THOUSANDS/UL (ref 149–390)
PMV BLD AUTO: 10 FL (ref 8.9–12.7)
POTASSIUM SERPL-SCNC: 4.3 MMOL/L (ref 3.5–5.3)
RBC # BLD AUTO: 4.94 MILLION/UL (ref 3.88–5.62)
SODIUM SERPL-SCNC: 134 MMOL/L (ref 136–145)
WBC # BLD AUTO: 6.4 THOUSAND/UL (ref 4.31–10.16)

## 2021-10-14 PROCEDURE — 80048 BASIC METABOLIC PNL TOTAL CA: CPT | Performed by: PHYSICIAN ASSISTANT

## 2021-10-14 PROCEDURE — 86140 C-REACTIVE PROTEIN: CPT | Performed by: PHYSICIAN ASSISTANT

## 2021-10-14 PROCEDURE — 99239 HOSP IP/OBS DSCHRG MGMT >30: CPT | Performed by: PHYSICIAN ASSISTANT

## 2021-10-14 PROCEDURE — 85025 COMPLETE CBC W/AUTO DIFF WBC: CPT | Performed by: PHYSICIAN ASSISTANT

## 2021-10-14 RX ORDER — ZINC SULFATE 50(220)MG
220 CAPSULE ORAL 2 TIMES DAILY
Qty: 60 CAPSULE | Refills: 0 | Status: SHIPPED | OUTPATIENT
Start: 2021-10-14 | End: 2021-10-14

## 2021-10-14 RX ORDER — GUAIFENESIN 600 MG
600 TABLET, EXTENDED RELEASE 12 HR ORAL EVERY 12 HOURS SCHEDULED
Qty: 60 TABLET | Refills: 0 | Status: SHIPPED | OUTPATIENT
Start: 2021-10-14 | End: 2021-10-14

## 2021-10-14 RX ORDER — DEXAMETHASONE 6 MG/1
6 TABLET ORAL
Qty: 6 TABLET | Refills: 0 | Status: SHIPPED | OUTPATIENT
Start: 2021-10-14 | End: 2022-06-21

## 2021-10-14 RX ORDER — GUAIFENESIN 600 MG
600 TABLET, EXTENDED RELEASE 12 HR ORAL EVERY 12 HOURS SCHEDULED
Qty: 60 TABLET | Refills: 0 | Status: SHIPPED | OUTPATIENT
Start: 2021-10-14 | End: 2022-06-21

## 2021-10-14 RX ORDER — MELATONIN
2000 DAILY
Qty: 30 TABLET | Refills: 0 | Status: SHIPPED | OUTPATIENT
Start: 2021-10-15 | End: 2021-10-14

## 2021-10-14 RX ORDER — ZINC SULFATE 50(220)MG
220 CAPSULE ORAL 2 TIMES DAILY
Qty: 60 CAPSULE | Refills: 0 | Status: SHIPPED | OUTPATIENT
Start: 2021-10-14 | End: 2022-06-21

## 2021-10-14 RX ORDER — MELATONIN
2000 DAILY
Qty: 30 TABLET | Refills: 0 | Status: SHIPPED | OUTPATIENT
Start: 2021-10-15

## 2021-10-14 RX ORDER — DEXAMETHASONE 6 MG/1
6 TABLET ORAL
Qty: 6 TABLET | Refills: 0 | Status: SHIPPED | OUTPATIENT
Start: 2021-10-14 | End: 2021-10-14 | Stop reason: SDUPTHER

## 2021-10-14 RX ADMIN — ONDANSETRON 4 MG: 2 INJECTION INTRAMUSCULAR; INTRAVENOUS at 07:40

## 2021-10-14 RX ADMIN — ACETAMINOPHEN 975 MG: 325 TABLET, FILM COATED ORAL at 08:40

## 2021-10-14 RX ADMIN — OXYCODONE HYDROCHLORIDE AND ACETAMINOPHEN 1000 MG: 500 TABLET ORAL at 08:37

## 2021-10-14 RX ADMIN — REMDESIVIR 100 MG: 100 INJECTION, POWDER, LYOPHILIZED, FOR SOLUTION INTRAVENOUS at 00:29

## 2021-10-14 RX ADMIN — GUAIFENESIN 600 MG: 600 TABLET ORAL at 08:38

## 2021-10-14 RX ADMIN — GABAPENTIN 800 MG: 400 CAPSULE ORAL at 06:37

## 2021-10-14 RX ADMIN — APIXABAN 2.5 MG: 2.5 TABLET, FILM COATED ORAL at 08:37

## 2021-10-14 RX ADMIN — DEXAMETHASONE SODIUM PHOSPHATE 6 MG: 4 INJECTION, SOLUTION INTRAMUSCULAR; INTRAVENOUS at 00:27

## 2021-10-14 RX ADMIN — ZINC SULFATE 220 MG (50 MG) CAPSULE 220 MG: CAPSULE at 08:39

## 2021-10-14 RX ADMIN — Medication 2000 UNITS: at 08:38

## 2021-11-01 ENCOUNTER — OFFICE VISIT (OUTPATIENT)
Dept: WOUND CARE | Facility: CLINIC | Age: 51
End: 2021-11-01
Payer: OTHER MISCELLANEOUS

## 2021-11-01 VITALS
DIASTOLIC BLOOD PRESSURE: 69 MMHG | TEMPERATURE: 98.5 F | SYSTOLIC BLOOD PRESSURE: 118 MMHG | RESPIRATION RATE: 20 BRPM | HEART RATE: 91 BPM

## 2021-11-01 DIAGNOSIS — G82.20 PARAPLEGIA (HCC): ICD-10-CM

## 2021-11-01 DIAGNOSIS — L89.523 PRESSURE INJURY OF LEFT ANKLE, STAGE 3 (HCC): Primary | ICD-10-CM

## 2021-11-01 DIAGNOSIS — R60.0 EDEMA OF BOTH LEGS: ICD-10-CM

## 2021-11-01 PROCEDURE — 97597 DBRDMT OPN WND 1ST 20 CM/<: CPT | Performed by: FAMILY MEDICINE

## 2021-11-01 PROCEDURE — 99213 OFFICE O/P EST LOW 20 MIN: CPT | Performed by: FAMILY MEDICINE

## 2021-11-01 RX ORDER — LEVOFLOXACIN 500 MG/1
500 TABLET, FILM COATED ORAL DAILY
COMMUNITY
Start: 2021-10-29 | End: 2021-11-08

## 2021-11-05 ENCOUNTER — TELEPHONE (OUTPATIENT)
Dept: WOUND CARE | Facility: CLINIC | Age: 51
End: 2021-11-05

## 2021-11-15 ENCOUNTER — OFFICE VISIT (OUTPATIENT)
Dept: WOUND CARE | Facility: CLINIC | Age: 51
End: 2021-11-15
Payer: COMMERCIAL

## 2021-11-15 VITALS
HEART RATE: 93 BPM | TEMPERATURE: 97.8 F | DIASTOLIC BLOOD PRESSURE: 85 MMHG | RESPIRATION RATE: 18 BRPM | SYSTOLIC BLOOD PRESSURE: 131 MMHG

## 2021-11-15 DIAGNOSIS — L89.523 PRESSURE INJURY OF LEFT ANKLE, STAGE 3 (HCC): Primary | ICD-10-CM

## 2021-11-15 PROCEDURE — 99213 OFFICE O/P EST LOW 20 MIN: CPT | Performed by: FAMILY MEDICINE

## 2021-11-15 RX ORDER — CEPHALEXIN 500 MG/1
500 CAPSULE ORAL 3 TIMES DAILY
COMMUNITY
Start: 2021-11-12 | End: 2021-11-19

## 2021-11-15 RX ORDER — LIDOCAINE 40 MG/G
CREAM TOPICAL ONCE
Status: COMPLETED | OUTPATIENT
Start: 2021-11-15 | End: 2021-11-15

## 2021-11-15 RX ADMIN — LIDOCAINE: 40 CREAM TOPICAL at 16:02

## 2021-11-29 ENCOUNTER — OFFICE VISIT (OUTPATIENT)
Dept: WOUND CARE | Facility: CLINIC | Age: 51
End: 2021-11-29
Payer: OTHER MISCELLANEOUS

## 2021-11-29 VITALS
SYSTOLIC BLOOD PRESSURE: 121 MMHG | RESPIRATION RATE: 18 BRPM | HEART RATE: 77 BPM | DIASTOLIC BLOOD PRESSURE: 80 MMHG | TEMPERATURE: 97.7 F

## 2021-11-29 DIAGNOSIS — G82.20 PARAPLEGIA (HCC): ICD-10-CM

## 2021-11-29 DIAGNOSIS — L89.523 PRESSURE INJURY OF LEFT ANKLE, STAGE 3 (HCC): Primary | ICD-10-CM

## 2021-11-29 PROCEDURE — 11042 DBRDMT SUBQ TIS 1ST 20SQCM/<: CPT | Performed by: FAMILY MEDICINE

## 2021-11-29 RX ORDER — LIDOCAINE 40 MG/G
CREAM TOPICAL ONCE
Status: COMPLETED | OUTPATIENT
Start: 2021-11-29 | End: 2021-11-29

## 2021-11-29 RX ADMIN — LIDOCAINE: 40 CREAM TOPICAL at 15:29

## 2021-12-11 ENCOUNTER — HOSPITAL ENCOUNTER (EMERGENCY)
Facility: HOSPITAL | Age: 51
Discharge: HOME | End: 2021-12-11
Attending: EMERGENCY MEDICINE | Admitting: EMERGENCY MEDICINE
Payer: OTHER MISCELLANEOUS

## 2021-12-11 ENCOUNTER — APPOINTMENT (EMERGENCY)
Dept: RADIOLOGY | Facility: HOSPITAL | Age: 51
End: 2021-12-11
Payer: OTHER MISCELLANEOUS

## 2021-12-11 ENCOUNTER — APPOINTMENT (EMERGENCY)
Dept: CT IMAGING | Facility: HOSPITAL | Age: 51
End: 2021-12-11
Payer: OTHER MISCELLANEOUS

## 2021-12-11 VITALS
SYSTOLIC BLOOD PRESSURE: 153 MMHG | OXYGEN SATURATION: 98 % | TEMPERATURE: 98.7 F | HEART RATE: 102 BPM | RESPIRATION RATE: 18 BRPM | WEIGHT: 180 LBS | BODY MASS INDEX: 25.1 KG/M2 | DIASTOLIC BLOOD PRESSURE: 76 MMHG

## 2021-12-11 DIAGNOSIS — D72.829 LEUKOCYTOSIS: ICD-10-CM

## 2021-12-11 DIAGNOSIS — N30.90 CYSTITIS: Primary | ICD-10-CM

## 2021-12-11 DIAGNOSIS — R93.2 ABNORMAL FINDING ON IMAGING OF LIVER: ICD-10-CM

## 2021-12-11 LAB
ALBUMIN SERPL BCP-MCNC: 4.2 G/DL (ref 3.5–5)
ALP SERPL-CCNC: 75 U/L (ref 46–116)
ALT SERPL W P-5'-P-CCNC: 32 U/L (ref 12–78)
ANION GAP SERPL CALCULATED.3IONS-SCNC: 9 MMOL/L (ref 4–13)
APTT PPP: 32 SECONDS (ref 23–37)
AST SERPL W P-5'-P-CCNC: 19 U/L (ref 5–45)
BACTERIA UR QL AUTO: ABNORMAL /HPF
BASOPHILS # BLD AUTO: 0.09 THOUSANDS/ΜL (ref 0–0.1)
BASOPHILS NFR BLD AUTO: 0 % (ref 0–1)
BILIRUB SERPL-MCNC: 0.67 MG/DL (ref 0.2–1)
BILIRUB UR QL STRIP: NEGATIVE
BUN SERPL-MCNC: 14 MG/DL (ref 5–25)
CALCIUM SERPL-MCNC: 9.4 MG/DL (ref 8.3–10.1)
CHLORIDE SERPL-SCNC: 99 MMOL/L (ref 100–108)
CLARITY UR: CLEAR
CO2 SERPL-SCNC: 29 MMOL/L (ref 21–32)
COLOR UR: ABNORMAL
CREAT SERPL-MCNC: 1.02 MG/DL (ref 0.6–1.3)
EOSINOPHIL # BLD AUTO: 0.04 THOUSAND/ΜL (ref 0–0.61)
EOSINOPHIL NFR BLD AUTO: 0 % (ref 0–6)
ERYTHROCYTE [DISTWIDTH] IN BLOOD BY AUTOMATED COUNT: 14.3 % (ref 11.6–15.1)
GFR SERPL CREATININE-BSD FRML MDRD: 85 ML/MIN/1.73SQ M
GLUCOSE SERPL-MCNC: 110 MG/DL (ref 65–140)
GLUCOSE UR STRIP-MCNC: NEGATIVE MG/DL
HCT VFR BLD AUTO: 43.2 % (ref 36.5–49.3)
HGB BLD-MCNC: 13.9 G/DL (ref 12–17)
HGB UR QL STRIP.AUTO: ABNORMAL
IMM GRANULOCYTES # BLD AUTO: 0.11 THOUSAND/UL (ref 0–0.2)
IMM GRANULOCYTES NFR BLD AUTO: 1 % (ref 0–2)
INR PPP: 1.04 (ref 0.84–1.19)
KETONES UR STRIP-MCNC: NEGATIVE MG/DL
LACTATE SERPL-SCNC: 1.6 MMOL/L (ref 0.5–2)
LEUKOCYTE ESTERASE UR QL STRIP: ABNORMAL
LYMPHOCYTES # BLD AUTO: 0.69 THOUSANDS/ΜL (ref 0.6–4.47)
LYMPHOCYTES NFR BLD AUTO: 3 % (ref 14–44)
MCH RBC QN AUTO: 27.8 PG (ref 26.8–34.3)
MCHC RBC AUTO-ENTMCNC: 32.2 G/DL (ref 31.4–37.4)
MCV RBC AUTO: 86 FL (ref 82–98)
MONOCYTES # BLD AUTO: 0.92 THOUSAND/ΜL (ref 0.17–1.22)
MONOCYTES NFR BLD AUTO: 4 % (ref 4–12)
NEUTROPHILS # BLD AUTO: 22.22 THOUSANDS/ΜL (ref 1.85–7.62)
NEUTS SEG NFR BLD AUTO: 92 % (ref 43–75)
NITRITE UR QL STRIP: NEGATIVE
NON-SQ EPI CELLS URNS QL MICRO: ABNORMAL /HPF
NRBC BLD AUTO-RTO: 0 /100 WBCS
PH UR STRIP.AUTO: 7 [PH]
PLATELET # BLD AUTO: 277 THOUSANDS/UL (ref 149–390)
PMV BLD AUTO: 10.4 FL (ref 8.9–12.7)
POTASSIUM SERPL-SCNC: 3.9 MMOL/L (ref 3.5–5.3)
PROT SERPL-MCNC: 8.8 G/DL (ref 6.4–8.2)
PROT UR STRIP-MCNC: NEGATIVE MG/DL
PROTHROMBIN TIME: 13.1 SECONDS (ref 11.6–14.5)
RBC # BLD AUTO: 5 MILLION/UL (ref 3.88–5.62)
RBC #/AREA URNS AUTO: ABNORMAL /HPF
SODIUM SERPL-SCNC: 137 MMOL/L (ref 136–145)
SP GR UR STRIP.AUTO: <=1.005 (ref 1–1.03)
UROBILINOGEN UR QL STRIP.AUTO: 0.2 E.U./DL
WBC # BLD AUTO: 24.07 THOUSAND/UL (ref 4.31–10.16)
WBC #/AREA URNS AUTO: ABNORMAL /HPF

## 2021-12-11 PROCEDURE — 87077 CULTURE AEROBIC IDENTIFY: CPT | Performed by: PHYSICIAN ASSISTANT

## 2021-12-11 PROCEDURE — G1004 CDSM NDSC: HCPCS

## 2021-12-11 PROCEDURE — 83605 ASSAY OF LACTIC ACID: CPT | Performed by: PHYSICIAN ASSISTANT

## 2021-12-11 PROCEDURE — 74177 CT ABD & PELVIS W/CONTRAST: CPT

## 2021-12-11 PROCEDURE — 99291 CRITICAL CARE FIRST HOUR: CPT | Performed by: PHYSICIAN ASSISTANT

## 2021-12-11 PROCEDURE — 87040 BLOOD CULTURE FOR BACTERIA: CPT | Performed by: PHYSICIAN ASSISTANT

## 2021-12-11 PROCEDURE — 36415 COLL VENOUS BLD VENIPUNCTURE: CPT | Performed by: PHYSICIAN ASSISTANT

## 2021-12-11 PROCEDURE — 99284 EMERGENCY DEPT VISIT MOD MDM: CPT

## 2021-12-11 PROCEDURE — 84145 PROCALCITONIN (PCT): CPT | Performed by: PHYSICIAN ASSISTANT

## 2021-12-11 PROCEDURE — 85610 PROTHROMBIN TIME: CPT | Performed by: PHYSICIAN ASSISTANT

## 2021-12-11 PROCEDURE — 81001 URINALYSIS AUTO W/SCOPE: CPT

## 2021-12-11 PROCEDURE — 87086 URINE CULTURE/COLONY COUNT: CPT | Performed by: PHYSICIAN ASSISTANT

## 2021-12-11 PROCEDURE — 87186 SC STD MICRODIL/AGAR DIL: CPT | Performed by: PHYSICIAN ASSISTANT

## 2021-12-11 PROCEDURE — 96361 HYDRATE IV INFUSION ADD-ON: CPT

## 2021-12-11 PROCEDURE — 93005 ELECTROCARDIOGRAM TRACING: CPT

## 2021-12-11 PROCEDURE — 80053 COMPREHEN METABOLIC PANEL: CPT | Performed by: PHYSICIAN ASSISTANT

## 2021-12-11 PROCEDURE — 85730 THROMBOPLASTIN TIME PARTIAL: CPT | Performed by: PHYSICIAN ASSISTANT

## 2021-12-11 PROCEDURE — 96365 THER/PROPH/DIAG IV INF INIT: CPT

## 2021-12-11 PROCEDURE — 71045 X-RAY EXAM CHEST 1 VIEW: CPT

## 2021-12-11 PROCEDURE — 85025 COMPLETE CBC W/AUTO DIFF WBC: CPT | Performed by: PHYSICIAN ASSISTANT

## 2021-12-11 RX ORDER — LEVOFLOXACIN 5 MG/ML
750 INJECTION, SOLUTION INTRAVENOUS ONCE
Status: COMPLETED | OUTPATIENT
Start: 2021-12-11 | End: 2021-12-11

## 2021-12-11 RX ORDER — LEVOFLOXACIN 750 MG/1
750 TABLET ORAL EVERY 24 HOURS
Qty: 5 TABLET | Refills: 0 | Status: SHIPPED | OUTPATIENT
Start: 2021-12-12 | End: 2021-12-17

## 2021-12-11 RX ADMIN — GABAPENTIN 400 MG: 300 CAPSULE ORAL at 15:11

## 2021-12-11 RX ADMIN — IOHEXOL 85 ML: 350 INJECTION, SOLUTION INTRAVENOUS at 14:33

## 2021-12-11 RX ADMIN — SODIUM CHLORIDE 1000 ML: 0.9 INJECTION, SOLUTION INTRAVENOUS at 11:41

## 2021-12-11 RX ADMIN — LEVOFLOXACIN 750 MG: 5 INJECTION, SOLUTION INTRAVENOUS at 11:53

## 2021-12-12 LAB — PROCALCITONIN SERPL-MCNC: 0.08 NG/ML

## 2021-12-13 ENCOUNTER — OFFICE VISIT (OUTPATIENT)
Dept: WOUND CARE | Facility: CLINIC | Age: 51
End: 2021-12-13
Payer: COMMERCIAL

## 2021-12-13 VITALS
TEMPERATURE: 97.3 F | RESPIRATION RATE: 16 BRPM | SYSTOLIC BLOOD PRESSURE: 138 MMHG | HEART RATE: 77 BPM | DIASTOLIC BLOOD PRESSURE: 92 MMHG

## 2021-12-13 DIAGNOSIS — L89.523 PRESSURE INJURY OF LEFT ANKLE, STAGE 3 (HCC): Primary | ICD-10-CM

## 2021-12-13 LAB
ATRIAL RATE: 107 BPM
P AXIS: 63 DEGREES
PR INTERVAL: 130 MS
QRS AXIS: 80 DEGREES
QRSD INTERVAL: 82 MS
QT INTERVAL: 324 MS
QTC INTERVAL: 432 MS
T WAVE AXIS: 57 DEGREES
VENTRICULAR RATE: 107 BPM

## 2021-12-13 PROCEDURE — 99213 OFFICE O/P EST LOW 20 MIN: CPT | Performed by: FAMILY MEDICINE

## 2021-12-13 PROCEDURE — 93010 ELECTROCARDIOGRAM REPORT: CPT | Performed by: INTERNAL MEDICINE

## 2021-12-14 LAB
BACTERIA UR CULT: ABNORMAL
BACTERIA UR CULT: ABNORMAL

## 2021-12-17 LAB
BACTERIA BLD CULT: NORMAL
BACTERIA BLD CULT: NORMAL

## 2022-01-03 ENCOUNTER — OFFICE VISIT (OUTPATIENT)
Dept: WOUND CARE | Facility: CLINIC | Age: 52
End: 2022-01-03
Payer: COMMERCIAL

## 2022-01-03 VITALS
RESPIRATION RATE: 18 BRPM | SYSTOLIC BLOOD PRESSURE: 138 MMHG | TEMPERATURE: 97.4 F | DIASTOLIC BLOOD PRESSURE: 79 MMHG | HEART RATE: 73 BPM

## 2022-01-03 DIAGNOSIS — G82.20 PARAPLEGIA (HCC): ICD-10-CM

## 2022-01-03 DIAGNOSIS — L89.523 PRESSURE INJURY OF LEFT ANKLE, STAGE 3 (HCC): Primary | ICD-10-CM

## 2022-01-03 PROCEDURE — 99213 OFFICE O/P EST LOW 20 MIN: CPT | Performed by: FAMILY MEDICINE

## 2022-01-03 RX ORDER — LIDOCAINE 40 MG/G
CREAM TOPICAL ONCE
Status: COMPLETED | OUTPATIENT
Start: 2022-01-03 | End: 2022-01-03

## 2022-01-03 RX ADMIN — LIDOCAINE: 40 CREAM TOPICAL at 15:26

## 2022-01-03 NOTE — PATIENT INSTRUCTIONS
Orders Placed This Encounter   Procedures    Wound cleansing and dressings     Wash your hands with soap and water  Cleanse all ulcers with normal saline or soap and water (Dove unscented) prior to applying a clean dressing          May apply skin prep to skin surrounding wound        Apply dermawound to the left medial ankle ulcer  Per Dr Santana Part you need to order this through the [facility] website      Cover with allevyn foam bordered  Change dressing twice a day and as needed for excessive drainage or leakage at home          Apply iodosorb to the wound in the office and cover with allevyn life foam for today   This can be left in place for two days if you can and then you can resume using your dermawound in the home    Follow up in 3 weeks at the [address] [facility]       Standing Status:   Future     Standing Expiration Date:   1/3/2023

## 2022-01-03 NOTE — PROGRESS NOTES
Patient ID: Prince Mckeon is a 49 y o  male Date of Birth 1970       Chief Complaint   Patient presents with    Follow Up Wound Care Visit     wound left ankle       Allergies:  Codeine, Dilaudid [hydromorphone], and Morphine and related    Diagnosis:   Diagnosis ICD-10-CM Associated Orders   1  Pressure injury of left ankle, stage 3 ([facility])  L89 523 Wound cleansing and dressings     lidocaine (LMX) 4 % cream   2  Paraplegia ([facility])  G82 20 Wound cleansing and dressings     lidocaine (LMX) 4 % cream        Assessment  & Plan:     Stage III pressure injury of the left ankle is significantly improved   Continue same wound care   Follow-up three weeks  Subjective:   9/27/21 Pt is a 49 y o  paraplegic M who presents for initial evaluation of wound on medial aspect of LLE and wound on Rt second toe and abrasion on lateral aspect of RLE  Pt states LLE wound has been reoccurring since 2014  Pt was seen 2 years ago at [facility] wound center but since then has been adamant on not returning  Pt has been applying Dermawound (betadine based) to wound and covering it dry dressing  Pt denies any drainage, fever or chills  10/4/21:  Patient returns regarding his pressure injury of the left medial malleolus  He continues using his Dermawound cream   He had been treated with two courses of antibiotics prior to coming here  One was doxycycline and the other clindamycin  He states that really had no effect  Other spots on the legs have stopped draining  11/1/21:  Patient returns after hospitalization for COVID-19  He states that he is doing well now but had a rough time in the hospital   He complains of both legs being swollen the right worse than the left  He received a Prevelon type boot to offload his left ankle but it appears that it is missing velcro to tighten the straps  He states that he has been using the Dermawound ointment with the exception while hospitalized    Even though the ulcer of the left medial ankle has gotten much larger, he believes that the base has improved  He believes that this is good  In the past, he has been very resistant to any recommendations that I had from a wound care perspective  I gave him time to continue using his ointment  11/15/21: Followup pressure injury 3 of the left medial ankle  He continue using his Dermawound ointment  He has been treated recently for cellulitis and UTI  Two round of antibiotic, 1st being Levaquin and the 2nd Keflex  Cellulitis was slightly atypical with erythema of both lower extremities and increased edema  11/21/21: Followup stage III pressure injury of the left medial ankle  Continues with his DermaWound ointment  No complaints  12/13/21:  Followup stage III pressure injury of the left medial ankle  Continues with dermal Wound ointment  He feels that it is slowly improving  No other new complaints  1/3/22: Followup stage III pressure injury of the left medial ankle  Continues to use Dermawound  Notes that it is significantly improved  May be due to using thicker socks in his boots              The following portions of the patient's history were reviewed and updated as appropriate:   Patient Active Problem List   Diagnosis    Atrial fibrillation with RVR ([address]    Multifocal pneumonia    Sepsis due to pneumonia ([address]    Paraplegia ([address]    Pneumonia due to COVID-19 virus    Acute respiratory failure with hypoxia ([address]    Pressure injury of left ankle, stage 3 (HCC)    Edema of both legs     Past Medical History:   Diagnosis Date    Back injuries     Neurogenic bladder     Paraplegia ([address]     Renal disorder      Past Surgical History:   Procedure Laterality Date    BACK SURGERY      NEPHRECTOMY       Family History   Problem Relation Age of Onset    Cancer Brother      Social History     Socioeconomic History    Marital status: Single     Spouse name: None    Number of children: None    Years of education: None    Highest education level: None   Occupational History    None   Tobacco Use    Smoking status: Never Smoker    Smokeless tobacco: Never Used   Vaping Use    Vaping Use: Never used   Substance and Sexual Activity    Alcohol use: Never    Drug use: No    Sexual activity: None   Other Topics Concern    None   Social History Narrative    None     Social Determinants of Health     Financial Resource Strain: Not on file   Food Insecurity: Not on file   Transportation Needs: Not on file   Physical Activity: Not on file   Stress: Not on file   Social Connections: Not on file   Intimate Partner Violence: Not on file   Housing Stability: Not on file       Current Outpatient Medications:     albuterol (Ventolin HFA) 90 mcg/act inhaler, Inhale 2 puffs every 6 (six) hours as needed for wheezing, Disp: 18 g, Rfl: 0    apixaban (ELIQUIS) 2 5 mg, Take 1 tablet (2 5 mg total) by mouth 2 (two) times a day, Disp: 60 tablet, Rfl: 0    ascorbic acid (VITAMIN C) 1000 MG tablet, Take 1 tablet (1,000 mg total) by mouth 2 (two) times a day, Disp: 30 tablet, Rfl: 0    cholecalciferol (VITAMIN D3) 1,000 units tablet, Take 2 tablets (2,000 Units total) by mouth daily, Disp: 30 tablet, Rfl: 0    dexamethasone (DECADRON) 6 mg tablet, Take 1 tablet (6 mg total) by mouth daily with breakfast, Disp: 6 tablet, Rfl: 0    diazepam (VALIUM) 5 mg tablet, Take 10 mg by mouth daily at bedtime as needed for anxiety , Disp: , Rfl:     gabapentin (NEURONTIN) 400 mg capsule, Take 400 mg by mouth 3 (three) times a day, Disp: , Rfl:     guaiFENesin (MUCINEX) 600 mg 12 hr tablet, Take 1 tablet (600 mg total) by mouth every 12 (twelve) hours, Disp: 60 tablet, Rfl: 0    ibuprofen (MOTRIN) 400 mg tablet, Take 1 tablet (400 mg total) by mouth every 6 (six) hours as needed for mild pain, Disp: 30 tablet, Rfl: 0    oxybutynin (DITROPAN-XL) 10 MG 24 hr tablet, Take 10 mg by mouth 2 (two) times a day, Disp: , Rfl:     TiZANidine (ZANAFLEX) 4 MG capsule, Take 4 mg by mouth daily at bedtime, Disp: , Rfl:     zinc sulfate (ZINCATE) 220 mg capsule, Take 1 capsule (220 mg total) by mouth 2 (two) times a day, Disp: 60 capsule, Rfl: 0  No current facility-administered medications for this visit  Review of Systems   Constitutional: Negative for appetite change, chills, fatigue, fever and unexpected weight change  HENT: Negative for congestion, hearing loss, postnasal drip and sinus pressure  Eyes: Negative for discharge and visual disturbance  Cardiovascular: Positive for leg swelling (Right greater than left)  Endocrine: Negative  Genitourinary: Positive for difficulty urinating  Musculoskeletal: Positive for gait problem (paraplegia)  Negative for back pain  Skin: Positive for wound (LLE medial aspect of the ankle)  Negative for rash  Allergic/Immunologic: Negative  Neurological: Positive for weakness (Paraplegia) and numbness (Lower extremities)  Negative for dizziness, tremors, seizures and headaches  Hematological: Does not bruise/bleed easily  Psychiatric/Behavioral: Negative  Negative for dysphoric mood  The patient is not nervous/anxious  Objective:  /79   Pulse 73   Temp (!) 97 4 °F (36 3 °C)   Resp 18   Pain Score: 0-No pain     Physical Exam  Vitals and nursing note reviewed  Constitutional:       Appearance: Normal appearance  He is well-developed and normal weight  HENT:      Head: Normocephalic and atraumatic  Cardiovascular:      Pulses:           Dorsalis pedis pulses are 2+ on the left side  Posterior tibial pulses are 2+ on the left side  Pulmonary:      Effort: Pulmonary effort is normal    Musculoskeletal:      Right lower leg: 3+ Edema present  Left lower le+ Edema present  Feet:    Feet:      Comments:   Ulceration with rolled edges  Epithelialization in the base  Edges are not as prominent    Some granulation tissue around the epithelialization  Skin:     General: Skin is warm and dry  Findings: Wound present  Comments:      Neurological:      Mental Status: He is alert and oriented to person, place, and time  Motor: Weakness (Paraplegia) present  Psychiatric:         Attention and Perception: Attention normal          Mood and Affect: Mood and affect normal          Behavior: Behavior is cooperative  Thought Content: Thought content normal          Cognition and Memory: Cognition normal        Photo unavailable  Wound 09/27/21 Pressure Injury Ankle Left;Medial (Active)   Wound Description Pink;Epithelialization 01/03/22 1518   Pressure Injury Stage Stage 3 01/03/22 1518   Tory-wound Assessment Scar Tissue;Dry; Intact 01/03/22 1518   Wound Length (cm) 1 cm 01/03/22 1518   Wound Width (cm) 1 1 cm 01/03/22 1518   Wound Depth (cm) 0 1 cm 01/03/22 1518   Wound Surface Area (cm^2) 1 1 cm^2 01/03/22 1518   Wound Volume (cm^3) 0 11 cm^3 01/03/22 1518   Calculated Wound Volume (cm^3) 0 11 cm^3 01/03/22 1518   Change in Wound Size % 8 33 01/03/22 1518   Drainage Amount Scant 01/03/22 1518   Drainage Description Serous 01/03/22 1518   Non-staged Wound Description Full thickness 01/03/22 1518           Results from last 6 Months   Lab Units 10/04/21  1641   WOUND CULTURE  No growth           Wound Instructions:  Orders Placed This Encounter   Procedures    Wound cleansing and dressings     Wash your hands with soap and water  Cleanse all ulcers with normal saline or soap and water (Dove unscented) prior to applying a clean dressing          May apply skin prep to skin surrounding wound        Apply dermawound to the left medial ankle ulcer  Per Dr Bereket Chun you need to order this through the [facility] website      Cover with allevyn foam bordered     Change dressing twice a day and as needed for excessive drainage or leakage at home          Apply iodosorb to the wound in the office and cover with allevyn life foam for today   This can be left in place for two days if you can and then you can resume using your dermawound in the home    Follow up in 3 weeks at the [address] [facility],[address]  Standing Status:   Future     Standing Expiration Date:   1/3/2023       Ivet Sanders MD, CHT, CWS       Portions of the record may have been created with voice recognition software  Occasional wrong word or "sound alike" substitutions may have occurred due to the inherent limitations of voice recognition software  Read the chart carefully and recognize, using context, where substitutions have occurred

## 2022-01-24 ENCOUNTER — OFFICE VISIT (OUTPATIENT)
Dept: WOUND CARE | Facility: CLINIC | Age: 52
End: 2022-01-24
Payer: OTHER MISCELLANEOUS

## 2022-01-24 VITALS
DIASTOLIC BLOOD PRESSURE: 79 MMHG | HEART RATE: 73 BPM | RESPIRATION RATE: 18 BRPM | TEMPERATURE: 97.5 F | SYSTOLIC BLOOD PRESSURE: 132 MMHG

## 2022-01-24 DIAGNOSIS — L89.523 PRESSURE INJURY OF LEFT ANKLE, STAGE 3 (HCC): Primary | ICD-10-CM

## 2022-01-24 PROCEDURE — 99213 OFFICE O/P EST LOW 20 MIN: CPT | Performed by: FAMILY MEDICINE

## 2022-01-24 RX ORDER — LIDOCAINE 40 MG/G
CREAM TOPICAL ONCE
Status: COMPLETED | OUTPATIENT
Start: 2022-01-24 | End: 2022-01-24

## 2022-01-24 RX ADMIN — LIDOCAINE: 40 CREAM TOPICAL at 15:18

## 2022-01-24 NOTE — PROGRESS NOTES
Patient ID: Daysi Johns is a 49 y o  male Date of Birth 1970       Chief Complaint   Patient presents with    Follow Up Wound Care Visit       Allergies:  Codeine, Dilaudid [hydromorphone], and Morphine and related    Diagnosis:   Diagnosis ICD-10-CM Associated Orders   1  Pressure injury of left ankle, stage 3 ([facility])  L89 523 Wound cleansing and dressings     lidocaine (LMX) 4 % cream        Assessment  & Plan:     Improving stage III pressure injury of the left ankle   Continue current wound care  See orders  Subjective:   9/27/21 Pt is a 49 y o  paraplegic M who presents for initial evaluation of wound on medial aspect of LLE and wound on Rt second toe and abrasion on lateral aspect of RLE  Pt states LLE wound has been reoccurring since 2014  Pt was seen 2 years ago at [facility] wound center but since then has been adamant on not returning  Pt has been applying Dermawound (betadine based) to wound and covering it dry dressing  Pt denies any drainage, fever or chills  10/4/21:  Patient returns regarding his pressure injury of the left medial malleolus  He continues using his Dermawound cream   He had been treated with two courses of antibiotics prior to coming here  One was doxycycline and the other clindamycin  He states that really had no effect  Other spots on the legs have stopped draining  11/1/21:  Patient returns after hospitalization for COVID-19  He states that he is doing well now but had a rough time in the hospital   He complains of both legs being swollen the right worse than the left  He received a Prevelon type boot to offload his left ankle but it appears that it is missing velcro to tighten the straps  He states that he has been using the Dermawound ointment with the exception while hospitalized  Even though the ulcer of the left medial ankle has gotten much larger, he believes that the base has improved  He believes that this is good    In the past, he has been very resistant to any recommendations that I had from a wound care perspective  I gave him time to continue using his ointment  11/15/21: Followup pressure injury 3 of the left medial ankle  He continue using his Dermawound ointment  He has been treated recently for cellulitis and UTI  Two round of antibiotic, 1st being Levaquin and the 2nd Keflex  Cellulitis was slightly atypical with erythema of both lower extremities and increased edema  11/21/21: Followup stage III pressure injury of the left medial ankle  Continues with his DermaWound ointment  No complaints  12/13/21:  Followup stage III pressure injury of the left medial ankle  Continues with dermal Wound ointment  He feels that it is slowly improving  No other new complaints  1/3/22: Followup stage III pressure injury of the left medial ankle  Continues to use Dermawound  Notes that it is significantly improved  May be due to using thicker socks in his boots  1/24/22: Followup stage III pressure injury of the left medial malleolus  Continues with Dermawound  Patient notes improvement  No other symptoms              The following portions of the patient's history were reviewed and updated as appropriate:   Patient Active Problem List   Diagnosis    Atrial fibrillation with RVR ([address] )    Multifocal pneumonia    Sepsis due to pneumonia ([address] )    Paraplegia ([address] )    Pneumonia due to COVID-19 virus    Acute respiratory failure with hypoxia ([address] )    Pressure injury of left ankle, stage 3 (HCC)    Edema of both legs     Past Medical History:   Diagnosis Date    Back injuries     Neurogenic bladder     Paraplegia ([address] )     Renal disorder      Past Surgical History:   Procedure Laterality Date    BACK SURGERY      NEPHRECTOMY       Family History   Problem Relation Age of Onset    Cancer Brother      Social History     Socioeconomic History    Marital status: Single     Spouse name: None    Number of children: None    Years of education: None    Highest education level: None   Occupational History    None   Tobacco Use    Smoking status: Never Smoker    Smokeless tobacco: Never Used   Vaping Use    Vaping Use: Never used   Substance and Sexual Activity    Alcohol use: Never    Drug use: No    Sexual activity: None   Other Topics Concern    None   Social History Narrative    None     Social Determinants of Health     Financial Resource Strain: Not on file   Food Insecurity: Not on file   Transportation Needs: Not on file   Physical Activity: Not on file   Stress: Not on file   Social Connections: Not on file   Intimate Partner Violence: Not on file   Housing Stability: Not on file       Current Outpatient Medications:     albuterol (Ventolin HFA) 90 mcg/act inhaler, Inhale 2 puffs every 6 (six) hours as needed for wheezing, Disp: 18 g, Rfl: 0    apixaban (ELIQUIS) 2 5 mg, Take 1 tablet (2 5 mg total) by mouth 2 (two) times a day, Disp: 60 tablet, Rfl: 0    ascorbic acid (VITAMIN C) 1000 MG tablet, Take 1 tablet (1,000 mg total) by mouth 2 (two) times a day, Disp: 30 tablet, Rfl: 0    cholecalciferol (VITAMIN D3) 1,000 units tablet, Take 2 tablets (2,000 Units total) by mouth daily, Disp: 30 tablet, Rfl: 0    dexamethasone (DECADRON) 6 mg tablet, Take 1 tablet (6 mg total) by mouth daily with breakfast, Disp: 6 tablet, Rfl: 0    diazepam (VALIUM) 5 mg tablet, Take 10 mg by mouth daily at bedtime as needed for anxiety , Disp: , Rfl:     gabapentin (NEURONTIN) 400 mg capsule, Take 400 mg by mouth 3 (three) times a day, Disp: , Rfl:     guaiFENesin (MUCINEX) 600 mg 12 hr tablet, Take 1 tablet (600 mg total) by mouth every 12 (twelve) hours, Disp: 60 tablet, Rfl: 0    ibuprofen (MOTRIN) 400 mg tablet, Take 1 tablet (400 mg total) by mouth every 6 (six) hours as needed for mild pain, Disp: 30 tablet, Rfl: 0    oxybutynin (DITROPAN-XL) 10 MG 24 hr tablet, Take 10 mg by mouth 2 (two) times a day, Disp: , Rfl:   TiZANidine (ZANAFLEX) 4 MG capsule, Take 4 mg by mouth daily at bedtime, Disp: , Rfl:     zinc sulfate (ZINCATE) 220 mg capsule, Take 1 capsule (220 mg total) by mouth 2 (two) times a day, Disp: 60 capsule, Rfl: 0  No current facility-administered medications for this visit  Review of Systems   Constitutional: Negative for appetite change, chills, fatigue, fever and unexpected weight change  HENT: Negative for congestion, hearing loss, postnasal drip and sinus pressure  Eyes: Negative for discharge and visual disturbance  Cardiovascular: Positive for leg swelling (Right greater than left)  Endocrine: Negative  Genitourinary: Positive for difficulty urinating  Musculoskeletal: Positive for gait problem (paraplegia)  Negative for back pain  Skin: Positive for wound (LLE medial aspect of the ankle)  Negative for rash  Allergic/Immunologic: Negative  Neurological: Positive for weakness (Paraplegia) and numbness (Lower extremities)  Negative for dizziness, tremors, seizures and headaches  Hematological: Does not bruise/bleed easily  Psychiatric/Behavioral: Negative  Negative for dysphoric mood  The patient is not nervous/anxious  Objective:  /79   Pulse 73   Temp 97 5 °F (36 4 °C)   Resp 18   Pain Score: 0-No pain     Physical Exam  Vitals and nursing note reviewed  Constitutional:       Appearance: Normal appearance  He is well-developed and normal weight  HENT:      Head: Normocephalic and atraumatic  Cardiovascular:      Pulses:           Dorsalis pedis pulses are 2+ on the left side  Posterior tibial pulses are 2+ on the left side  Pulmonary:      Effort: Pulmonary effort is normal    Musculoskeletal:      Right lower leg: 3+ Edema present  Left lower le+ Edema present  Feet:    Feet:      Comments:   Ulceration with increased central epithelialization  Edges are not as prominent    Some granulation tissue around the epithelialization  Skin:     General: Skin is warm and dry  Findings: Wound present  Comments:      Neurological:      Mental Status: He is alert and oriented to person, place, and time  Motor: Weakness (Paraplegia) present  Psychiatric:         Attention and Perception: Attention normal          Mood and Affect: Mood and affect normal          Behavior: Behavior is cooperative  Thought Content: Thought content normal          Cognition and Memory: Cognition normal              Wound 09/27/21 Pressure Injury Ankle Left;Medial (Active)   Wound Image Images linked 01/24/22 1503   Wound Description Pink;Epithelialization 01/24/22 1502   Tory-wound Assessment Scar Tissue;Dry; Intact 01/24/22 1502   Wound Length (cm) 0 8 cm 01/24/22 1502   Wound Width (cm) 1 cm 01/24/22 1502   Wound Depth (cm) 0 2 cm 01/24/22 1502   Wound Surface Area (cm^2) 0 8 cm^2 01/24/22 1502   Wound Volume (cm^3) 0 16 cm^3 01/24/22 1502   Calculated Wound Volume (cm^3) 0 16 cm^3 01/24/22 1502   Change in Wound Size % -33 33 01/24/22 1502   Drainage Amount Scant 01/24/22 1502   Drainage Description Yellow 01/24/22 1502   Non-staged Wound Description Full thickness 01/24/22 1502   Patient Tolerance Tolerated well 01/24/22 1502           Results from last 6 Months   Lab Units 10/04/21  1641   WOUND CULTURE  No growth           Wound Instructions:  Orders Placed This Encounter   Procedures    Wound cleansing and dressings     Wash your hands with soap and water  Cleanse all ulcers with normal saline or soap and water (Dove unscented) prior to applying a clean dressing          May apply skin prep to skin surrounding wound        Apply dermawound to the left medial ankle ulcer  Per Dr Lakia Quick you need to order this through the [facility] website      Cover with allevyn foam bordered       Change dressing twice a day and as needed for excessive drainage or leakage at home          Apply iodosorb to the wound in the office and cover with allevyn life foam for today   This can be left in place for two days if you can and then you can resume using your dermawound in the home     Follow up in 3 weeks at the [address] [facility],[address]  Standing Status:   Future     Standing Expiration Date:   1/24/2023       Maribel Wade MD, CHT, CWS       Portions of the record may have been created with voice recognition software  Occasional wrong word or "sound alike" substitutions may have occurred due to the inherent limitations of voice recognition software  Read the chart carefully and recognize, using context, where substitutions have occurred

## 2022-01-24 NOTE — PATIENT INSTRUCTIONS
Orders Placed This Encounter   Procedures    Wound cleansing and dressings     Wash your hands with soap and water  Cleanse all ulcers with normal saline or soap and water (Dove unscented) prior to applying a clean dressing          May apply skin prep to skin surrounding wound        Apply dermawound to the left medial ankle ulcer  Per Dr Sharri Gandhi you need to order this through the [facility] website      Cover with allevyn foam bordered  Change dressing twice a day and as needed for excessive drainage or leakage at home          Apply iodosorb to the wound in the office and cover with allevyn life foam for today   This can be left in place for two days if you can and then you can resume using your dermawound in the home     Follow up in 3 weeks at the [address] [facility],[address]       Standing Status:   Future     Standing Expiration Date:   1/24/2023

## 2022-02-14 ENCOUNTER — OFFICE VISIT (OUTPATIENT)
Dept: WOUND CARE | Facility: CLINIC | Age: 52
End: 2022-02-14
Payer: OTHER MISCELLANEOUS

## 2022-02-14 VITALS
SYSTOLIC BLOOD PRESSURE: 130 MMHG | RESPIRATION RATE: 18 BRPM | DIASTOLIC BLOOD PRESSURE: 107 MMHG | HEART RATE: 90 BPM | TEMPERATURE: 96.7 F

## 2022-02-14 DIAGNOSIS — G82.20 PARAPLEGIA (HCC): ICD-10-CM

## 2022-02-14 DIAGNOSIS — L89.523 PRESSURE INJURY OF LEFT ANKLE, STAGE 3 (HCC): Primary | ICD-10-CM

## 2022-02-14 PROCEDURE — 99213 OFFICE O/P EST LOW 20 MIN: CPT | Performed by: FAMILY MEDICINE

## 2022-02-14 RX ORDER — LIDOCAINE 40 MG/G
CREAM TOPICAL ONCE
Status: COMPLETED | OUTPATIENT
Start: 2022-02-14 | End: 2022-02-14

## 2022-02-14 RX ADMIN — LIDOCAINE: 40 CREAM TOPICAL at 15:23

## 2022-02-14 NOTE — PATIENT INSTRUCTIONS
Orders Placed This Encounter   Procedures    Wound cleansing and dressings     Wound cleansing and dressings       Wash your hands with soap and water  Cleanse all ulcers with normal saline or soap and water (Dove unscented) prior to applying a clean dressing          May apply skin prep to skin surrounding wound        Apply dermawound to the left medial ankle ulcer  Per Dr Logan Michael you need to order this through the [facility] website      Cover with allevyn foam bordered       Change dressing twice a day and as needed for excessive drainage or leakage at home          Apply iodosorb to the wound in the office and cover with allevyn life foam for today   This can be left in place for two days if you can and then you can resume using your dermawound in the home      You are d/c at this time    Call should any issues occur     Standing Status:   Future     Standing Expiration Date:   2/14/2023

## 2022-02-14 NOTE — PROGRESS NOTES
Patient ID: Jaquan Joy is a 49 y o  male Date of Birth 1970       Chief Complaint   Patient presents with    Follow Up Wound Care Visit     Patient here for follow left leg wound       Allergies:  Codeine, Dilaudid [hydromorphone], and Morphine and related    Diagnosis:   Diagnosis ICD-10-CM Associated Orders   1  Pressure injury of left ankle, stage 3 ([facility])  L89 523 lidocaine (LMX) 4 % cream     Wound cleansing and dressings   2  Paraplegia ([facility])  G82 20 lidocaine (LMX) 4 % cream     Wound cleansing and dressings        Assessment  & Plan:     Improving stage III pressure injury of the left ankle   Continue his current wound care   As per patient request, he will be discharged from the wound center in return p r n  He feels that he can continue same wound care and it has been healing  We have not been doing debridements  Subjective:   9/27/21 Pt is a 49 y o  paraplegic M who presents for initial evaluation of wound on medial aspect of LLE and wound on Rt second toe and abrasion on lateral aspect of RLE  Pt states LLE wound has been reoccurring since 2014  Pt was seen 2 years ago at [facility] wound center but since then has been adamant on not returning  Pt has been applying Dermawound (betadine based) to wound and covering it dry dressing  Pt denies any drainage, fever or chills  10/4/21:  Patient returns regarding his pressure injury of the left medial malleolus  He continues using his Dermawound cream   He had been treated with two courses of antibiotics prior to coming here  One was doxycycline and the other clindamycin  He states that really had no effect  Other spots on the legs have stopped draining  11/1/21:  Patient returns after hospitalization for COVID-19  He states that he is doing well now but had a rough time in the hospital   He complains of both legs being swollen the right worse than the left    He received a Prevelon type boot to offload his left ankle but it appears that it is missing velcro to tighten the straps  He states that he has been using the Dermawound ointment with the exception while hospitalized  Even though the ulcer of the left medial ankle has gotten much larger, he believes that the base has improved  He believes that this is good  In the past, he has been very resistant to any recommendations that I had from a wound care perspective  I gave him time to continue using his ointment  11/15/21: Followup pressure injury 3 of the left medial ankle  He continue using his Dermawound ointment  He has been treated recently for cellulitis and UTI  Two round of antibiotic, 1st being Levaquin and the 2nd Keflex  Cellulitis was slightly atypical with erythema of both lower extremities and increased edema  11/21/21: Followup stage III pressure injury of the left medial ankle  Continues with his DermaWound ointment  No complaints  12/13/21:  Followup stage III pressure injury of the left medial ankle  Continues with dermal Wound ointment  He feels that it is slowly improving  No other new complaints  1/3/22: Followup stage III pressure injury of the left medial ankle  Continues to use Dermawound  Notes that it is significantly improved  May be due to using thicker socks in his boots  1/24/22: Followup stage III pressure injury of the left medial malleolus  Continues with Dermawound  Patient notes improvement  No other symptoms  2/14/22: Followup stage III pressure injury of the left medial malleolus  Continues with the Dermawound  He continues to see improvement  No complaints              The following portions of the patient's history were reviewed and updated as appropriate:   Patient Active Problem List   Diagnosis    Atrial fibrillation with RVR ([facility]    Multifocal pneumonia    Sepsis due to pneumonia ([facility]    Paraplegia ([facility]    Pneumonia due to COVID-19 virus    Acute respiratory failure with hypoxia ([facility] )  Pressure injury of left ankle, stage 3 (HCC)    Edema of both legs     Past Medical History:   Diagnosis Date    Back injuries     Neurogenic bladder     Paraplegia ([address] )     Renal disorder      Past Surgical History:   Procedure Laterality Date    BACK SURGERY      NEPHRECTOMY       Family History   Problem Relation Age of Onset    Cancer Brother      Social History     Socioeconomic History    Marital status: Single     Spouse name: None    Number of children: None    Years of education: None    Highest education level: None   Occupational History    None   Tobacco Use    Smoking status: Never Smoker    Smokeless tobacco: Never Used   Vaping Use    Vaping Use: Never used   Substance and Sexual Activity    Alcohol use: Never    Drug use: No    Sexual activity: None   Other Topics Concern    None   Social History Narrative    None     Social Determinants of Health     Financial Resource Strain: Not on file   Food Insecurity: Not on file   Transportation Needs: Not on file   Physical Activity: Not on file   Stress: Not on file   Social Connections: Not on file   Intimate Partner Violence: Not on file   Housing Stability: Not on file       Current Outpatient Medications:     albuterol (Ventolin HFA) 90 mcg/act inhaler, Inhale 2 puffs every 6 (six) hours as needed for wheezing, Disp: 18 g, Rfl: 0    apixaban (ELIQUIS) 2 5 mg, Take 1 tablet (2 5 mg total) by mouth 2 (two) times a day, Disp: 60 tablet, Rfl: 0    ascorbic acid (VITAMIN C) 1000 MG tablet, Take 1 tablet (1,000 mg total) by mouth 2 (two) times a day, Disp: 30 tablet, Rfl: 0    cholecalciferol (VITAMIN D3) 1,000 units tablet, Take 2 tablets (2,000 Units total) by mouth daily, Disp: 30 tablet, Rfl: 0    dexamethasone (DECADRON) 6 mg tablet, Take 1 tablet (6 mg total) by mouth daily with breakfast, Disp: 6 tablet, Rfl: 0    diazepam (VALIUM) 5 mg tablet, Take 10 mg by mouth daily at bedtime as needed for anxiety , Disp: , Rfl:    gabapentin (NEURONTIN) 400 mg capsule, Take 400 mg by mouth 3 (three) times a day, Disp: , Rfl:     guaiFENesin (MUCINEX) 600 mg 12 hr tablet, Take 1 tablet (600 mg total) by mouth every 12 (twelve) hours, Disp: 60 tablet, Rfl: 0    ibuprofen (MOTRIN) 400 mg tablet, Take 1 tablet (400 mg total) by mouth every 6 (six) hours as needed for mild pain, Disp: 30 tablet, Rfl: 0    oxybutynin (DITROPAN-XL) 10 MG 24 hr tablet, Take 10 mg by mouth 2 (two) times a day, Disp: , Rfl:     TiZANidine (ZANAFLEX) 4 MG capsule, Take 4 mg by mouth daily at bedtime, Disp: , Rfl:     zinc sulfate (ZINCATE) 220 mg capsule, Take 1 capsule (220 mg total) by mouth 2 (two) times a day, Disp: 60 capsule, Rfl: 0  No current facility-administered medications for this visit  Review of Systems   Constitutional: Negative for appetite change, chills, fatigue, fever and unexpected weight change  HENT: Negative for congestion, hearing loss, postnasal drip and sinus pressure  Eyes: Negative for discharge and visual disturbance  Cardiovascular: Positive for leg swelling (Right greater than left)  Endocrine: Negative  Genitourinary: Positive for difficulty urinating  Musculoskeletal: Positive for gait problem (paraplegia)  Negative for back pain  Skin: Positive for wound (LLE medial aspect of the ankle)  Negative for rash  Allergic/Immunologic: Negative  Neurological: Positive for weakness (Paraplegia) and numbness (Lower extremities)  Negative for dizziness, tremors, seizures and headaches  Hematological: Does not bruise/bleed easily  Psychiatric/Behavioral: Negative  Negative for dysphoric mood  The patient is not nervous/anxious  Objective:  BP (!) 130/107   Pulse 90   Temp (!) 96 7 °F (35 9 °C)   Resp 18   Pain Score:   6     Physical Exam  Vitals and nursing note reviewed  Constitutional:       Appearance: Normal appearance  He is well-developed and normal weight     HENT:      Head: Normocephalic and atraumatic  Cardiovascular:      Pulses:           Dorsalis pedis pulses are 2+ on the left side  Posterior tibial pulses are 2+ on the left side  Pulmonary:      Effort: Pulmonary effort is normal    Musculoskeletal:      Right lower leg: 3+ Edema present  Left lower le+ Edema present  Feet:    Feet:      Comments:   Ulceration with increased central epithelialization  Edges are not as prominent  Some granulation tissue around the epithelialization  Overall improved  Skin:     General: Skin is warm and dry  Findings: Wound present  Comments:      Neurological:      Mental Status: He is alert and oriented to person, place, and time  Motor: Weakness (Paraplegia) present  Psychiatric:         Attention and Perception: Attention normal          Mood and Affect: Mood and affect normal          Behavior: Behavior is cooperative  Thought Content: Thought content normal          Cognition and Memory: Cognition normal                        Results from last 6 Months   Lab Units 10/04/21  1641   WOUND CULTURE  No growth           Wound Instructions:  Orders Placed This Encounter   Procedures    Wound cleansing and dressings     Wound cleansing and dressings       Wash your hands with soap and water  Cleanse all ulcers with normal saline or soap and water (Dove unscented) prior to applying a clean dressing          May apply skin prep to skin surrounding wound        Apply dermawound to the left medial ankle ulcer  Per Dr Patel Lipoma you need to order this through the [facility] website      Cover with allevyn foam bordered       Change dressing twice a day and as needed for excessive drainage or leakage at home          Apply iodosorb to the wound in the office and cover with allevyn life foam for today   This can be left in place for two days if you can and then you can resume using your dermawound in the home      You are d/c at this time    Call should any issues occur     Standing Status:   Future     Standing Expiration Date:   2/14/2023       Genoveva Larson MD, CHT, CWS       Portions of the record may have been created with voice recognition software  Occasional wrong word or "sound alike" substitutions may have occurred due to the inherent limitations of voice recognition software  Read the chart carefully and recognize, using context, where substitutions have occurred

## 2022-03-24 ENCOUNTER — OFFICE VISIT (OUTPATIENT)
Dept: WOUND CARE | Facility: CLINIC | Age: 52
End: 2022-03-24
Payer: COMMERCIAL

## 2022-03-24 VITALS
TEMPERATURE: 97.4 F | HEART RATE: 82 BPM | RESPIRATION RATE: 20 BRPM | DIASTOLIC BLOOD PRESSURE: 87 MMHG | SYSTOLIC BLOOD PRESSURE: 127 MMHG

## 2022-03-24 DIAGNOSIS — L89.523 PRESSURE INJURY OF LEFT ANKLE, STAGE 3 (HCC): Primary | ICD-10-CM

## 2022-03-24 PROCEDURE — 99203 OFFICE O/P NEW LOW 30 MIN: CPT | Performed by: SPECIALIST

## 2022-03-24 PROCEDURE — 97597 DBRDMT OPN WND 1ST 20 CM/<: CPT | Performed by: SPECIALIST

## 2022-03-24 PROCEDURE — 99213 OFFICE O/P EST LOW 20 MIN: CPT | Performed by: SPECIALIST

## 2022-03-24 NOTE — PATIENT INSTRUCTIONS
Orders Placed This Encounter   Procedures    Wound cleansing and dressings     Left Ankle Wound  Wash your hands with soap and water  Remove old dressing, discard into plastic bag and place in trash  Cleanse the wound with soap (unscented Dove) and water prior to applying a clean dressing  Do not use tissue or cotton balls  Do not scrub the wound  Pat dry using gauze  Shower yes, keep dressing clean and dry  If dressing gets wet change dressing  Apply acticoat 3  to the left ankle wound  Cover with allevyn bordered foam  This was done today  Apply ace wrap from base of toes to below knee at home  Change dressing daily  Elastic Tubular Stocking    Tubular elastic bandage: Apply from base of toes to behind the knee  Apply in AM, may remove for sleep  Avoid prolonged standing in one place  Elevate leg(s) above the level of the heart when sitting or as much as possible       Follow up at [address] [facility] in one week     Standing Status:   Future     Standing Expiration Date:   3/24/2023

## 2022-03-24 NOTE — PROGRESS NOTES
Patient ID: Sriram Lundberg is a 49 y o  male Date of Birth 1970     Chief Complaint   Patient presents with   [facility] Patient Visit     left ankle wound, drainage increased 1 week ago, apply dermawound changes dressing dailly       Allergies  Codeine, Dilaudid [hydromorphone], and Morphine and related    Assessment / Plan:    No problem-specific Assessment & Plan notes found for this encounter  Diagnoses and all orders for this visit:    Pressure injury of left ankle, stage 3 (HCC)  -     Wound cleansing and dressings; Future              Debridement   Wound 03/24/22 Pressure Injury Ankle Left;Medial    Universal Protocol:  Consent: Written consent obtained  Consent given by: patient  Time out: Immediately prior to procedure a "time out" was called to verify the correct patient, procedure, equipment, support staff and site/side marked as required  Patient understanding: patient states understanding of the procedure being performed  Patient identity confirmed: verbally with patient      Performed by: physician  Debridement type: selective  Pain control: lidocaine 4%  Pre-debridement measurements  Length (cm): 2 1  Width (cm): 2 2  Depth (cm): 0 1  Surface Area (cm^2): 4 62  Volume (cm^3): 0 46    Post-debridement measurements  Length (cm): 2 1  Width (cm): 2 2  Depth (cm): 0 1  Percent debrided: 50%  Surface Area (cm^2): 4 62  Area debrided (cm^2): 2 31  Volume (cm^3): 0 46  Devitalized tissue debrided: biofilm, exudate and fibrin  Instrument(s) utilized: curette  Bleeding: none  Hemostasis obtained with: not applicable  Procedural pain (0-10): insensate  Post-procedural pain: insensate   Response to treatment: procedure was tolerated well  Debridement Comments: Chronic scar at center, granulation tissue and new epithelium at margin  Patient resistant to using any compression  Normal DP pulse appreciated             Wound 03/24/22 Pressure Injury Ankle Left;Medial (Active)   Wound Image Images linked 03/24/22 1333   Wound Description Beefy red;Slough 03/24/22 1313   Tory-wound Assessment Edema; Erythema 03/24/22 1313   Wound Length (cm) 2 1 cm 03/24/22 1313   Wound Width (cm) 2 2 cm 03/24/22 1313   Wound Depth (cm) 0 1 cm 03/24/22 1313   Wound Surface Area (cm^2) 4 62 cm^2 03/24/22 1313   Wound Volume (cm^3) 0 462 cm^3 03/24/22 1313   Calculated Wound Volume (cm^3) 0 46 cm^3 03/24/22 1313   Drainage Amount Moderate 03/24/22 1313   Drainage Description Serous 03/24/22 1313   Treatments Irrigation with NSS 03/24/22 1313   Patient Tolerance Tolerated well 03/24/22 1313       Subjective:        With chronic recurrent pressure ulcer in dependent, edematous leg  Patient elevates at night, but dependent in the day, will not allow wrap, but was provided with an ace wrap so that he can wrap it himself as he sees fit  The following portions of the patient's history were reviewed and updated as appropriate: He  has a past medical history of Back injuries, Neurogenic bladder, Paraplegia ([address] ), and Renal disorder  He   Patient Active Problem List    Diagnosis Date Noted    Pressure injury of left ankle, stage 3 ([address] ) 11/01/2021    Edema of both legs 11/01/2021    Acute respiratory failure with hypoxia ([address] ) 10/12/2021    Pneumonia due to COVID-19 virus 10/11/2021    Paraplegia ([address] ) 06/05/2020    Atrial fibrillation with RVR ([address] ) 06/04/2020    Multifocal pneumonia 06/04/2020    Sepsis due to pneumonia ([address] ) 06/04/2020     He  has a past surgical history that includes Back surgery and Nephrectomy  His family history includes Cancer in his brother  He  reports that he has never smoked  He has never used smokeless tobacco  He reports that he does not drink alcohol and does not use drugs    Current Outpatient Medications   Medication Sig Dispense Refill    diazepam (VALIUM) 5 mg tablet Take 10 mg by mouth daily at bedtime as needed for anxiety       gabapentin (NEURONTIN) 400 mg capsule Take 400 mg by mouth 3 (three) times a day      oxybutynin (DITROPAN-XL) 10 MG 24 hr tablet Take 10 mg by mouth 2 (two) times a day      TiZANidine (ZANAFLEX) 4 MG capsule Take 4 mg by mouth daily at bedtime      albuterol (Ventolin HFA) 90 mcg/act inhaler Inhale 2 puffs every 6 (six) hours as needed for wheezing (Patient not taking: Reported on 3/24/2022 ) 18 g 0    apixaban (ELIQUIS) 2 5 mg Take 1 tablet (2 5 mg total) by mouth 2 (two) times a day (Patient not taking: Reported on 3/24/2022 ) 60 tablet 0    ascorbic acid (VITAMIN C) 1000 MG tablet Take 1 tablet (1,000 mg total) by mouth 2 (two) times a day (Patient not taking: Reported on 3/24/2022 ) 30 tablet 0    cholecalciferol (VITAMIN D3) 1,000 units tablet Take 2 tablets (2,000 Units total) by mouth daily (Patient not taking: Reported on 3/24/2022 ) 30 tablet 0    dexamethasone (DECADRON) 6 mg tablet Take 1 tablet (6 mg total) by mouth daily with breakfast (Patient not taking: Reported on 3/24/2022 ) 6 tablet 0    guaiFENesin (MUCINEX) 600 mg 12 hr tablet Take 1 tablet (600 mg total) by mouth every 12 (twelve) hours (Patient not taking: Reported on 3/24/2022 ) 60 tablet 0    ibuprofen (MOTRIN) 400 mg tablet Take 1 tablet (400 mg total) by mouth every 6 (six) hours as needed for mild pain (Patient not taking: Reported on 3/24/2022 ) 30 tablet 0    zinc sulfate (ZINCATE) 220 mg capsule Take 1 capsule (220 mg total) by mouth 2 (two) times a day (Patient not taking: Reported on 3/24/2022 ) 60 capsule 0     No current facility-administered medications for this visit       Current Outpatient Medications on File Prior to Visit   Medication Sig    diazepam (VALIUM) 5 mg tablet Take 10 mg by mouth daily at bedtime as needed for anxiety     gabapentin (NEURONTIN) 400 mg capsule Take 400 mg by mouth 3 (three) times a day    oxybutynin (DITROPAN-XL) 10 MG 24 hr tablet Take 10 mg by mouth 2 (two) times a day    TiZANidine (ZANAFLEX) 4 MG capsule Take 4 mg by mouth daily at bedtime    albuterol (Ventolin HFA) 90 mcg/act inhaler Inhale 2 puffs every 6 (six) hours as needed for wheezing (Patient not taking: Reported on 3/24/2022 )    apixaban (ELIQUIS) 2 5 mg Take 1 tablet (2 5 mg total) by mouth 2 (two) times a day (Patient not taking: Reported on 3/24/2022 )    ascorbic acid (VITAMIN C) 1000 MG tablet Take 1 tablet (1,000 mg total) by mouth 2 (two) times a day (Patient not taking: Reported on 3/24/2022 )    cholecalciferol (VITAMIN D3) 1,000 units tablet Take 2 tablets (2,000 Units total) by mouth daily (Patient not taking: Reported on 3/24/2022 )    dexamethasone (DECADRON) 6 mg tablet Take 1 tablet (6 mg total) by mouth daily with breakfast (Patient not taking: Reported on 3/24/2022 )    guaiFENesin (MUCINEX) 600 mg 12 hr tablet Take 1 tablet (600 mg total) by mouth every 12 (twelve) hours (Patient not taking: Reported on 3/24/2022 )    ibuprofen (MOTRIN) 400 mg tablet Take 1 tablet (400 mg total) by mouth every 6 (six) hours as needed for mild pain (Patient not taking: Reported on 3/24/2022 )    zinc sulfate (ZINCATE) 220 mg capsule Take 1 capsule (220 mg total) by mouth 2 (two) times a day (Patient not taking: Reported on 3/24/2022 )     No current facility-administered medications on file prior to visit  He is allergic to codeine, dilaudid [hydromorphone], and morphine and related       Review of Systems   HENT: Negative  Respiratory: Negative for shortness of breath  Gastrointestinal: Negative  Skin: Positive for wound  Neurological:        Hx of paraplegia due to spinal cord injury         Objective:       Wound 03/24/22 Pressure Injury Ankle Left;Medial (Active)   Wound Image Images linked 03/24/22 1333   Wound Description Beefy red;Slough 03/24/22 1313   Tory-wound Assessment Edema; Erythema 03/24/22 1313   Wound Length (cm) 2 1 cm 03/24/22 1313   Wound Width (cm) 2 2 cm 03/24/22 1313   Wound Depth (cm) 0 1 cm 03/24/22 1313   Wound Surface Area (cm^2) 4 62 cm^2 03/24/22 1313   Wound Volume (cm^3) 0 462 cm^3 03/24/22 1313   Calculated Wound Volume (cm^3) 0 46 cm^3 03/24/22 1313   Drainage Amount Moderate 03/24/22 1313   Drainage Description Serous 03/24/22 1313   Treatments Irrigation with NSS 03/24/22 1313   Patient Tolerance Tolerated well 03/24/22 1313       /87   Pulse 82   Temp (!) 97 4 °F (36 3 °C)   Resp 20     Physical Exam  HENT:      Head: Normocephalic  Eyes:      Pupils: Pupils are equal, round, and reactive to light  Cardiovascular:      Rate and Rhythm: Normal rate  Pulmonary:      Effort: Pulmonary effort is normal    Musculoskeletal:      Right lower leg: Edema present  Left lower leg: Edema present  Skin:     General: Skin is warm and dry  Findings: Lesion present  Comments: With vasodilatation of dependent extremities   Neurological:      Comments: With reflex spacticity of leg with debridement of the ulcer  Psychiatric:         Mood and Affect: Mood normal            Wound Instructions:  Orders Placed This Encounter   Procedures    Wound cleansing and dressings     Left Ankle Wound  Wash your hands with soap and water  Remove old dressing, discard into plastic bag and place in trash  Cleanse the wound with soap (unscented Dove) and water prior to applying a clean dressing  Do not use tissue or cotton balls  Do not scrub the wound  Pat dry using gauze  Shower yes, keep dressing clean and dry  If dressing gets wet change dressing  Apply acticoat 3  to the left ankle wound  Cover with allevyn bordered foam  This was done today  Apply ace wrap from base of toes to below knee at home  Change dressing daily  Elastic Tubular Stocking    Tubular elastic bandage: Apply from base of toes to behind the knee  Apply in AM, may remove for sleep  Avoid prolonged standing in one place  Elevate leg(s) above the level of the heart when sitting or as much as possible       Follow up at [address] [facility] in one week     Standing Status:   Future     Standing Expiration Date:   3/24/2023

## 2022-03-29 ENCOUNTER — OFFICE VISIT (OUTPATIENT)
Dept: WOUND CARE | Facility: CLINIC | Age: 52
End: 2022-03-29
Payer: OTHER MISCELLANEOUS

## 2022-03-29 VITALS
SYSTOLIC BLOOD PRESSURE: 134 MMHG | HEART RATE: 91 BPM | TEMPERATURE: 97.1 F | RESPIRATION RATE: 18 BRPM | DIASTOLIC BLOOD PRESSURE: 74 MMHG

## 2022-03-29 DIAGNOSIS — L89.523 PRESSURE INJURY OF LEFT ANKLE, STAGE 3 (HCC): Primary | ICD-10-CM

## 2022-03-29 DIAGNOSIS — G82.20 PARAPLEGIA (HCC): ICD-10-CM

## 2022-03-29 PROCEDURE — 87070 CULTURE OTHR SPECIMN AEROBIC: CPT | Performed by: FAMILY MEDICINE

## 2022-03-29 PROCEDURE — 99213 OFFICE O/P EST LOW 20 MIN: CPT | Performed by: FAMILY MEDICINE

## 2022-03-29 PROCEDURE — 87205 SMEAR GRAM STAIN: CPT | Performed by: FAMILY MEDICINE

## 2022-03-29 NOTE — PROGRESS NOTES
Patient ID: Erin Mcmahon is a 49 y o  male Date of Birth 1970       Chief Complaint   Patient presents with    Follow Up Wound Care Visit     left ankle wound        Allergies:  Codeine, Dilaudid [hydromorphone], and Morphine and related    Diagnosis:   Diagnosis ICD-10-CM Associated Orders   1  Pressure injury of left ankle, stage 3 ([facility])  L89 523 Wound cleansing and dressings     Wound culture and Gram stain     Wound culture and Gram stain   2  Paraplegia ([address] )  G82 20         Assessment  & Plan:     Improvement of pressure injury stage III of the left ankle since last visit last week     Some increased erythema on one side of the ulcer  Culture obtained today  Erythema marked to assess for progression  The patient will email me a photo in the next day or two  Will Heed the culture   For now, continue Acticoat three changing the dressing about 3 times a week  Patient will return in 3-4 weeks  Subjective:   9/27/21 Pt is a 49 y o  paraplegic M who presents for initial evaluation of wound on medial aspect of LLE and wound on Rt second toe and abrasion on lateral aspect of RLE  Pt states LLE wound has been reoccurring since 2014  Pt was seen 2 years ago at [facility] wound center but since then has been adamant on not returning  Pt has been applying Dermawound (betadine based) to wound and covering it dry dressing  Pt denies any drainage, fever or chills  10/4/21:  Patient returns regarding his pressure injury of the left medial malleolus  He continues using his Dermawound cream   He had been treated with two courses of antibiotics prior to coming here  One was doxycycline and the other clindamycin  He states that really had no effect  Other spots on the legs have stopped draining  11/1/21:  Patient returns after hospitalization for COVID-19    He states that he is doing well now but had a rough time in the hospital   He complains of both legs being swollen the right worse than the left   He received a Prevelon type boot to offload his left ankle but it appears that it is missing velcro to tighten the straps  He states that he has been using the Dermawound ointment with the exception while hospitalized  Even though the ulcer of the left medial ankle has gotten much larger, he believes that the base has improved  He believes that this is good  In the past, he has been very resistant to any recommendations that I had from a wound care perspective  I gave him time to continue using his ointment  11/15/21: Followup pressure injury 3 of the left medial ankle  He continue using his Dermawound ointment  He has been treated recently for cellulitis and UTI  Two round of antibiotic, 1st being Levaquin and the 2nd Keflex  Cellulitis was slightly atypical with erythema of both lower extremities and increased edema  11/21/21: Followup stage III pressure injury of the left medial ankle  Continues with his DermaWound ointment  No complaints  12/13/21:  Followup stage III pressure injury of the left medial ankle  Continues with dermal Wound ointment  He feels that it is slowly improving  No other new complaints  1/3/22: Followup stage III pressure injury of the left medial ankle  Continues to use Dermawound  Notes that it is significantly improved  May be due to using thicker socks in his boots  1/24/22: Followup stage III pressure injury of the left medial malleolus  Continues with Dermawound  Patient notes improvement  No other symptoms  2/14/22: Followup stage III pressure injury of the left medial malleolus  Continues with the Dermawound  He continues to see improvement  No complaints  3/24/22: With chronic recurrent pressure ulcer in dependent, edematous leg  Patient elevates at night, but dependent in the day, will not allow wrap, but was provided with an ace wrap so that he can wrap it himself as he sees fit      3/29/22:  Patient returns regarding his stage III pressure injury of his left medial malleolus  Saw Dr Sakina Chavez last week who debrided and started Acticoat 3  He notes since using the current dressing, the ulcer has improved significantly  He is still concerned about an area of erythema adjacent to the open ulcer that started about two days ago  He believes is increasing  He has not had any increased warmth at the site, fever or chills                The following portions of the patient's history were reviewed and updated as appropriate:   Patient Active Problem List   Diagnosis    At[facility] with RVR ([facility] )    Multifocal pneumonia    [facility] to [facility]    [facility]    Pneumonia due to COVID-19 virus    Acute respiratory failure with hypoxia [facility]    Pressure injury of left ankle, stage 3 (HCC)    Edema of both legs     Past Medical History:   Diagnosis Date    Back injuries     Neurogenic bladder     [facility]     Renal disorder      Past Surgical History:   Procedure Laterality Date    BACK SURGERY      NEPHRECTOMY       Family History   Problem Relation Age of Onset    Cancer Brother      Social History     Socioeconomic History    Marital status: Single     Spouse name: None    Number of children: None    Years of education: None    Highest education level: None   Occupational History    None   Tobacco Use    Smoking status: Never Smoker    Smokeless tobacco: Never Used   Vaping Use    Vaping Use: Never used   Substance and Sexual Activity    Alcohol use: Never    Drug use: No    Sexual activity: None   Other Topics Concern    None   Social History Narrative    None     Social Determinants of Health     Financial Resource Strain: Not on file   Food Insecurity: Not on file   Transportation Needs: Not on file   Physical Activity: Not on file   Stress: Not on file   Social Connections: Not on file   Intimate Partner Violence: Not on file   Housing Stability: Not on file       Current Outpatient Medications:     albuterol (Ventolin HFA) 90 mcg/act inhaler, Inhale 2 puffs every 6 (six) hours as needed for wheezing (Patient not taking: Reported on 3/24/2022 ), Disp: 18 g, Rfl: 0    apixaban (ELIQUIS) 2 5 mg, Take 1 tablet (2 5 mg total) by mouth 2 (two) times a day (Patient not taking: Reported on 3/24/2022 ), Disp: 60 tablet, Rfl: 0    ascorbic acid (VITAMIN C) 1000 MG tablet, Take 1 tablet (1,000 mg total) by mouth 2 (two) times a day (Patient not taking: Reported on 3/24/2022 ), Disp: 30 tablet, Rfl: 0    cholecalciferol (VITAMIN D3) 1,000 units tablet, Take 2 tablets (2,000 Units total) by mouth daily (Patient not taking: Reported on 3/24/2022 ), Disp: 30 tablet, Rfl: 0    dexamethasone (DECADRON) 6 mg tablet, Take 1 tablet (6 mg total) by mouth daily with breakfast (Patient not taking: Reported on 3/24/2022 ), Disp: 6 tablet, Rfl: 0    diazepam (VALIUM) 5 mg tablet, Take 10 mg by mouth daily at bedtime as needed for anxiety , Disp: , Rfl:     gabapentin (NEURONTIN) 400 mg capsule, Take 400 mg by mouth 3 (three) times a day, Disp: , Rfl:     guaiFENesin (MUCINEX) 600 mg 12 hr tablet, Take 1 tablet (600 mg total) by mouth every 12 (twelve) hours (Patient not taking: Reported on 3/24/2022 ), Disp: 60 tablet, Rfl: 0    ibuprofen (MOTRIN) 400 mg tablet, Take 1 tablet (400 mg total) by mouth every 6 (six) hours as needed for mild pain (Patient not taking: Reported on 3/24/2022 ), Disp: 30 tablet, Rfl: 0    oxybutynin (DITROPAN-XL) 10 MG 24 hr tablet, Take 10 mg by mouth 2 (two) times a day, Disp: , Rfl:     TiZANidine (ZANAFLEX) 4 MG capsule, Take 4 mg by mouth daily at bedtime, Disp: , Rfl:     zinc sulfate (ZINCATE) 220 mg capsule, Take 1 capsule (220 mg total) by mouth 2 (two) times a day (Patient not taking: Reported on 3/24/2022 ), Disp: 60 capsule, Rfl: 0    Review of Systems   Constitutional: Negative for appetite change, chills, fatigue, fever and unexpected weight change  HENT: Negative for congestion, hearing loss, postnasal drip and sinus pressure  Eyes: Negative for discharge and visual disturbance  Cardiovascular: Positive for leg swelling (Right greater than left)  Endocrine: Negative  Genitourinary: Positive for difficulty urinating  Musculoskeletal: Positive for gait problem (paraplegia)  Negative for back pain  Skin: Positive for wound (LLE medial aspect of the ankle)  Negative for rash  Allergic/Immunologic: Negative  Neurological: Positive for weakness (Paraplegia) and numbness (Lower extremities)  Negative for dizziness, tremors, seizures and headaches  Hematological: Does not bruise/bleed easily  Psychiatric/Behavioral: Negative  Negative for dysphoric mood  The patient is not nervous/anxious  Objective:  /74   Pulse 91   Temp (!) 97 1 °F (36 2 °C)   Resp 18   Pain Score:   6 (generalized ache, neuropathic pain)     Physical Exam  Vitals and nursing note reviewed  Constitutional:       Appearance: Normal appearance  He is well-developed and normal weight  HENT:      Head: Normocephalic and atraumatic  Cardiovascular:      Pulses:           Dorsalis pedis pulses are 2+ on the left side  Posterior tibial pulses are 2+ on the left side  Pulmonary:      Effort: Pulmonary effort is normal    Musculoskeletal:      Right lower leg: 3+ Edema present  Left lower le+ Edema present  Feet:    Feet:      Comments:  Ulceration with some fibrin in the base  Surrounding skin is well healed compared to last week  There is an area of erythema with scaling at the edge that has occured within the past two days  No fluctuance or definite cellulitis  Skin:     General: Skin is warm and dry  Findings: Wound present  Comments:      Neurological:      Mental Status: He is alert and oriented to person, place, and time  Motor: Weakness (Paraplegia) present     Psychiatric:         Attention and Perception: Attention normal          Mood and Affect: Mood and affect normal          Behavior: Behavior is cooperative  Thought Content: Thought content normal          Cognition and Memory: Cognition normal          Wound 03/24/22 Pressure Injury Ankle Left;Medial (Active)   Wound Image   03/29/22 1130   Wound Description Pink;Slough; Epithelialization 03/29/22 1109   Tory-wound Assessment Edema;Dry;Erythema; Excoriated 03/29/22 1109   Wound Length (cm) 0 8 cm 03/29/22 1109   Wound Width (cm) 1 1 cm 03/29/22 1109   Wound Depth (cm) 0 1 cm 03/29/22 1109   Wound Surface Area (cm^2) 0 88 cm^2 03/29/22 1109   Wound Volume (cm^3) 0 088 cm^3 03/29/22 1109   Calculated Wound Volume (cm^3) 0 09 cm^3 03/29/22 1109   Change in Wound Size % 80 43 03/29/22 1109   Drainage Amount Small 03/29/22 1109   Drainage Description Serous 03/29/22 1109   Non-staged Wound Description Full thickness 03/29/22 1109   Treatments Irrigation with NSS 03/29/22 1109   Patient Tolerance Tolerated well 03/29/22 1109                                Results from last 6 Months   Lab Units 10/04/21  1641   WOUND CULTURE  No growth           Wound Instructions:  Orders Placed This Encounter   Procedures    Wound cleansing and dressings     Left Ankle Wound   Wash your hands with soap and water   Remove old dressing, discard into plastic bag and place in trash   Cleanse the wound with soap (unscented Dove) and water prior to applying a clean dressing  Do not use tissue or cotton balls  Do not scrub the wound  Pat dry using gauze  Shower yes, keep dressing clean and dry   If dressing gets wet change dressing as soon as possible      Apply acticoat 3  to the left ankle wound   Cover with allevyn bordered foam  This was done today    May apply ace wrap from base of toes to below knee at home  Leave dressing on for up to 3 days  Change dressing sooner if it gets wet or dirty    Pull back allevyn edge daily to monitor wound and periwound for increased redness or infection  Take picture of wound in 2 days and send to Dr Joycelyn Power via email  Wound culture done today     May apply ace wrap from base of toes to below knee at home  Elastic Tubular Stocking   Tubular elastic bandage: Apply from base of toes to behind the knee  Apply in AM, may remove for sleep  Avoid prolonged standing in one place  Elevate leg(s) above the level of the heart when sitting or as much as possible  [facility]     Standing Status:   Future     Standing Expiration Date:   3/29/2023    Wound culture and Gram stain     Standing Status:   Future     Number of Occurrences:   1     Standing Expiration Date:   3/29/2023     Order Specific Question:   Release to patient through [address] [place] [address] [address]     Answer:   Immediate       Amanda Bills MD, CHT, CWS       Portions of the record may have been created with voice recognition software  Occasional wrong word or "sound alike" substitutions may have occurred due to the inherent limitations of voice recognition software  Read the chart carefully and recognize, using context, where substitutions have occurred

## 2022-03-29 NOTE — PATIENT INSTRUCTIONS
Orders Placed This Encounter   Procedures    Wound cleansing and dressings     Left Ankle Wound   Wash your hands with soap and water  Larisa Lush old dressing, discard into plastic bag and place in trash   Cleanse the wound with soap (unscented Dove) and water prior to applying a clean dressing  Do not use tissue or cotton balls  Do not scrub the wound  Pat dry using gauze  Shower yes, keep dressing clean and dry   If dressing gets wet change dressing as soon as possible      Apply acticoat 3  to the left ankle wound   Cover with allevyn bordered foam  This was done today    May apply ace wrap from base of toes to below knee at home  Leave dressing on for up to 3 days  Change dressing sooner if it gets wet or dirty  Pull back allevyn edge daily to monitor wound and periwound for increased redness or infection  Take picture of wound in 2 days and send to Dr Ruby Murray via email  Wound culture done today     May apply ace wrap from base of toes to below knee at home  Elastic Tubular Stocking   Tubular elastic bandage: Apply from base of toes to behind the knee  Apply in AM, may remove for sleep  Avoid prolonged standing in one place  Elevate leg(s) above the level of the heart when sitting or as much as possible       Follow up at [address] [facility] in two weeks     Standing Status:   Future     Standing Expiration Date:   3/29/2023

## 2022-04-01 LAB
BACTERIA WND AEROBE CULT: NO GROWTH
GRAM STN SPEC: NORMAL

## 2022-04-18 ENCOUNTER — OFFICE VISIT (OUTPATIENT)
Dept: WOUND CARE | Facility: CLINIC | Age: 52
End: 2022-04-18
Payer: OTHER MISCELLANEOUS

## 2022-04-18 VITALS
HEART RATE: 84 BPM | SYSTOLIC BLOOD PRESSURE: 136 MMHG | RESPIRATION RATE: 20 BRPM | TEMPERATURE: 97.3 F | DIASTOLIC BLOOD PRESSURE: 78 MMHG

## 2022-04-18 DIAGNOSIS — L89.523 PRESSURE INJURY OF LEFT ANKLE, STAGE 3 (HCC): Primary | ICD-10-CM

## 2022-04-18 PROCEDURE — 99213 OFFICE O/P EST LOW 20 MIN: CPT | Performed by: FAMILY MEDICINE

## 2022-04-18 RX ORDER — TRIAMCINOLONE ACETONIDE 5 MG/G
CREAM TOPICAL 2 TIMES DAILY
Qty: 30 G | Refills: 0 | Status: SHIPPED | OUTPATIENT
Start: 2022-04-18

## 2022-04-18 NOTE — PATIENT INSTRUCTIONS
Orders Placed This Encounter   Procedures    Wound cleansing and dressings     Wash your hands with soap and water  Remove old dressing, discard into plastic bag and place in trash  Cleanse the left ankle ulcer with soap (unscented Dove) and water prior to applying a clean dressing  Do not use tissue or cotton balls  Do not scrub the wound  Pat dry using gauze  Shower yes, keep dressing clean and dry   If dressing gets wet change dressing as soon as possible       Apply Betamethasone to chicho ulcer daily  Apply alginate to the left ankle ulcer  Cover with allevyn bordered foam  Change dressing three times per week and as needed for excessive drainage or leakage  This was done today   May apply ace wrap from base of toes to below knee at home  Elastic Tubular Stocking     Tubular elastic bandage: Apply from base of toes to behind the knee  Apply in AM, may remove for sleep  Avoid prolonged standing in one place  Elevate leg(s) above the level of the heart when sitting or as much as possible  [facility] [facility]     Standing Status:   Future     Standing Expiration Date:   4/18/2023     Wound Healing and Your Diet   WHAT YOU NEED TO KNOW:   Your body uses nutrients from healthy foods to heal wounds caused by injury, surgery, or pressure injuries  There is no special diet that will heal your wound, but a healthy meal plan can help your wound heal faster  Nutrients that are important for healing are protein, zinc, and vitamin C  Liquids are also important for wound healing  DISCHARGE INSTRUCTIONS:   Follow a healthy meal plan:   · Eat a variety of foods from each food group every day  Eat regular meals and snacks to help you get enough calories and nutrients  If you have trouble eating 3 meals each day, eat 5 to 6 small meals throughout the day instead  Include good sources of protein, zinc, and vitamin C each day  · Drink liquids as directed    Drink plenty of liquids during and between meals, unless your healthcare provider has told you to limit liquids  Ask your healthcare provider or dietitian how much liquid to drink each day and which liquids are best for you  · Limit unhealthy foods,  such as those that are high in fat, sugar, and salt  Examples include doughnuts, cookies, fried foods, candy, and regular soda  These kinds of foods are low in nutrients that are important for healing  Good sources of protein:  Your dietitian will tell you how much protein and how many calories you need each day  The average amount of protein in foods is listed below in grams (g)  To find the exact amount of protein in a food, read the food labels on packaged items  · Dairy:      ? 1 cup of any type of milk (8 g)    ? ½ cup of evaporated canned milk (9 g)    ? ¼ cup of nonfat dry milk (11 g)    ? 1 ounce of semi-hard or solid cheese (7 g)    ? ¼ cup of parmesan cheese (8 g)    ? ½ cup of cottage cheese (14 g)    ? ½ cup of pudding (4 g)    ? 1 cup of plain or fruit yogurt (8 g)    · Meats and meat substitutes:      ? 3 ounces of cooked freshwater fish (21 g)    ? 3 ounces of cooked shellfish (19 g)    ? ½ cup of canned tuna (14 g)    ? 3 ounces of cooked chicken, turkey, or other poultry (24 g)    ? 3 ounces of cooked beef, pork, lamb, or other red meat (21 g)    ? 1 large egg (6 g)    ? ¼ cup of fat-free egg substitute (5 g)    ? ½ cup of tofu or tempeh (10 g)    ? 1 cup of cooked dried beans, such as cooper, kidney, or navy (15 g)    · Nuts and seeds:      ? 2 tablespoons of almonds, cashews, sunflower seeds, or walnuts (5 g)    ? 2 tablespoons of peanuts (7 g)    ? 2 tablespoons of peanut butter (8 g)       How to add extra protein:   · Add powdered milk to milk, cereals, scrambled eggs, soups, and casseroles  · Add cheese to sauces, soups, or vegetables  · Add eggs to tuna, salads, sauces, or casseroles      · Add nutrition supplements and breakfast drink mixes to milk or shakes  · Add nuts to foods or eat them as snacks  · Add meat (beef, chicken, or pork) to soups, casseroles, pasta dishes, or vegetables  · Add beans, peas, and other legumes to salads  · Eat cottage cheese or yogurt with fruit  Good sources of vitamin C:  Vitamin C is found in fruits and vegetables  Fruits such as oranges, strawberries, grapefruit, cantaloupe, and tangerines are good sources of vitamin C  Red and green bell peppers, broccoli, potatoes, tomatoes, and cabbage are also high in vitamin C        Good sources of zinc:  Good sources of zinc are beef, liver, and crab  Smaller amounts of zinc are found in sunflower seeds, almonds, peanut butter, eggs, and milk  Other foods that contain zinc include wheat germ, black-eyed peas, and whole-grain products  How to add extra calories to foods: Your dietitian may recommend that you add extra calories to your meals if you are not eating enough  You can increase calories by adding butter, margarine, sugar, or jam to foods  Work with your dietitian if you have other medical conditions and need to follow a special diet  What else you should know about nutrients and wound healing: Your healthcare provider or dietitian may recommend vitamin and nutrition supplements if your body is low in certain nutrients  If your dietitian recommends a liquid nutrition supplement, take it between meals  Ask your healthcare provider which supplements are right for you  © Copyright "[employer]" 2022 Information is for End User's use only and may not be sold, redistributed or otherwise used for commercial purposes  All illustrations and images included in CareNotes® are the copyrighted property of [employer]  or Jolanta Weber   The above information is an  only  It is not intended as medical advice for individual conditions or treatments   Talk to your doctor, nurse or pharmacist before following any medical regimen to see if it is safe and effective for you

## 2022-04-18 NOTE — PROGRESS NOTES
Patient ID: Michael Manjarrez is a 49 y o  male Date of Birth 1970       Chief Complaint   Patient presents with    Follow Up Wound Care Visit     Left ankle ulcer       Allergies:  Codeine, Dilaudid [hydromorphone], and Morphine and related    Diagnosis:   Diagnosis ICD-10-CM Associated Orders   1  Pressure injury of left ankle, stage 3 ([facility])  L89 523 Wound cleansing and dressings     triamcinolone (KENALOG) 0 5 % cream        Assessment  & Plan:     Stage III pressure ulcer of the left ankle  Slight deterioration  Culture previously was negative   DC Acticoat  Use alginate and bordered foam    Triamcinolone cream chicho ulcers twice a day  Followup in one week  Subjective:   9/27/21 Pt is a 49 y o  paraplegic M who presents for initial evaluation of wound on medial aspect of LLE and wound on Rt second toe and abrasion on lateral aspect of RLE  Pt states LLE wound has been reoccurring since 2014  Pt was seen 2 years ago at [facility] wound center but since then has been adamant on not returning  Pt has been applying Dermawound (betadine based) to wound and covering it dry dressing  Pt denies any drainage, fever or chills  10/4/21:  Patient returns regarding his pressure injury of the left medial malleolus  He continues using his Dermawound cream   He had been treated with two courses of antibiotics prior to coming here  One was doxycycline and the other clindamycin  He states that really had no effect  Other spots on the legs have stopped draining  11/1/21:  Patient returns after hospitalization for COVID-19  He states that he is doing well now but had a rough time in the hospital   He complains of both legs being swollen the right worse than the left  He received a Prevelon type boot to offload his left ankle but it appears that it is missing velcro to tighten the straps  He states that he has been using the Dermawound ointment with the exception while hospitalized    Even though the ulcer of the left medial ankle has gotten much larger, he believes that the base has improved  He believes that this is good  In the past, he has been very resistant to any recommendations that I had from a wound care perspective  I gave him time to continue using his ointment  11/15/21: Followup pressure injury 3 of the left medial ankle  He continue using his Dermawound ointment  He has been treated recently for cellulitis and UTI  Two round of antibiotic, 1st being Levaquin and the 2nd Keflex  Cellulitis was slightly atypical with erythema of both lower extremities and increased edema  11/21/21: Followup stage III pressure injury of the left medial ankle  Continues with his DermaWound ointment  No complaints  12/13/21:  Followup stage III pressure injury of the left medial ankle  Continues with dermal Wound ointment  He feels that it is slowly improving  No other new complaints  1/3/22: Followup stage III pressure injury of the left medial ankle  Continues to use Dermawound  Notes that it is significantly improved  May be due to using thicker socks in his boots  1/24/22: Followup stage III pressure injury of the left medial malleolus  Continues with Dermawound  Patient notes improvement  No other symptoms  2/14/22: Followup stage III pressure injury of the left medial malleolus  Continues with the Dermawound  He continues to see improvement  No complaints  3/24/22: With chronic recurrent pressure ulcer in dependent, edematous leg  Patient elevates at night, but dependent in the day, will not allow wrap, but was provided with an ace wrap so that he can wrap it himself as he sees fit  3/29/22:  Patient returns regarding his stage III pressure injury of his left medial malleolus  Saw Dr Sada Gagnon last week who debrided and started Acticoat 3  He notes since using the current dressing, the ulcer has improved significantly    He is still concerned about an area of erythema adjacent to the open ulcer that started about two days ago  He believes is increasing  He has not had any increased warmth at the site, fever or chills  4/18/22: Followup stage III pressure injury of the left medial ankle  At last visit, culture was obtained in there is no growth  Acticoat was prescribed which he continues to use  However recently, he noted deterioration and slight increased drainage                The following portions of the patient's history were reviewed and updated as appropriate:   Patient Active Problem List   Diagnosis    Atrial fibrillation with RVR ([facility] )    Multifocal pneumonia    Sepsis due to pneumonia ([facility] )    Paraplegia ([facility] )    Pneumonia due to COVID-19 virus    Acute respiratory failure with hypoxia ([facility] )    Pressure injury of left ankle, stage 3 (HCC)    Edema of both legs     Past Medical History:   Diagnosis Date    Back injuries     Neurogenic bladder     Paraplegia ([facility] )     Renal disorder      Past Surgical History:   Procedure Laterality Date    BACK SURGERY      NEPHRECTOMY       Family History   Problem Relation Age of Onset    Cancer Brother      Social History     Socioeconomic History    Marital status: Single     Spouse name: None    Number of children: None    Years of education: None    Highest education level: None   Occupational History    None   Tobacco Use    Smoking status: Never Smoker    Smokeless tobacco: Never Used   Vaping Use    Vaping Use: Never used   Substance and Sexual Activity    Alcohol use: Never    Drug use: No    Sexual activity: None   Other Topics Concern    None   Social History Narrative    None     Social Determinants of Health     Financial Resource Strain: Not on file   Food Insecurity: Not on file   Transportation Needs: Not on file   Physical Activity: Not on file   Stress: Not on file   Social Connections: Not on file   Intimate Partner Violence: Not on file   Housing Stability: Not on file Current Outpatient Medications:     albuterol (Ventolin HFA) 90 mcg/act inhaler, Inhale 2 puffs every 6 (six) hours as needed for wheezing (Patient not taking: Reported on 3/24/2022 ), Disp: 18 g, Rfl: 0    apixaban (ELIQUIS) 2 5 mg, Take 1 tablet (2 5 mg total) by mouth 2 (two) times a day (Patient not taking: Reported on 3/24/2022 ), Disp: 60 tablet, Rfl: 0    ascorbic acid (VITAMIN C) 1000 MG tablet, Take 1 tablet (1,000 mg total) by mouth 2 (two) times a day (Patient not taking: Reported on 3/24/2022 ), Disp: 30 tablet, Rfl: 0    cholecalciferol (VITAMIN D3) 1,000 units tablet, Take 2 tablets (2,000 Units total) by mouth daily (Patient not taking: Reported on 3/24/2022 ), Disp: 30 tablet, Rfl: 0    dexamethasone (DECADRON) 6 mg tablet, Take 1 tablet (6 mg total) by mouth daily with breakfast (Patient not taking: Reported on 3/24/2022 ), Disp: 6 tablet, Rfl: 0    diazepam (VALIUM) 5 mg tablet, Take 10 mg by mouth daily at bedtime as needed for anxiety , Disp: , Rfl:     gabapentin (NEURONTIN) 400 mg capsule, Take 400 mg by mouth 3 (three) times a day, Disp: , Rfl:     guaiFENesin (MUCINEX) 600 mg 12 hr tablet, Take 1 tablet (600 mg total) by mouth every 12 (twelve) hours (Patient not taking: Reported on 3/24/2022 ), Disp: 60 tablet, Rfl: 0    ibuprofen (MOTRIN) 400 mg tablet, Take 1 tablet (400 mg total) by mouth every 6 (six) hours as needed for mild pain (Patient not taking: Reported on 3/24/2022 ), Disp: 30 tablet, Rfl: 0    oxybutynin (DITROPAN-XL) 10 MG 24 hr tablet, Take 10 mg by mouth 2 (two) times a day, Disp: , Rfl:     TiZANidine (ZANAFLEX) 4 MG capsule, Take 4 mg by mouth daily at bedtime, Disp: , Rfl:     triamcinolone (KENALOG) 0 5 % cream, Apply topically 2 (two) times a day, Disp: 30 g, Rfl: 0    zinc sulfate (ZINCATE) 220 mg capsule, Take 1 capsule (220 mg total) by mouth 2 (two) times a day (Patient not taking: Reported on 3/24/2022 ), Disp: 60 capsule, Rfl: 0    Review of Systems   Constitutional: Negative for appetite change, chills, fatigue, fever and unexpected weight change  HENT: Negative for congestion, hearing loss, postnasal drip and sinus pressure  Eyes: Negative for discharge and visual disturbance  Cardiovascular: Positive for leg swelling (Right greater than left)  Endocrine: Negative  Genitourinary: Positive for difficulty urinating  Musculoskeletal: Positive for gait problem (paraplegia)  Negative for back pain  Skin: Positive for wound (LLE medial aspect of the ankle)  Negative for rash  Allergic/Immunologic: Negative  Neurological: Positive for weakness (Paraplegia) and numbness (Lower extremities)  Negative for dizziness, tremors, seizures and headaches  Hematological: Does not bruise/bleed easily  Psychiatric/Behavioral: Negative  Negative for dysphoric mood  The patient is not nervous/anxious  Objective:  /78   Pulse 84   Temp (!) 97 3 °F (36 3 °C)   Resp 20   Pain Score:  (Patient has no pain at wound)     Physical Exam  Vitals and nursing note reviewed  Constitutional:       Appearance: Normal appearance  He is well-developed and normal weight  HENT:      Head: Normocephalic and atraumatic  Cardiovascular:      Pulses:           Dorsalis pedis pulses are 2+ on the left side  Posterior tibial pulses are 2+ on the left side  Pulmonary:      Effort: Pulmonary effort is normal    Musculoskeletal:      Right lower leg: 3+ Edema present  Left lower le+ Edema present  Feet:    Feet:      Comments:  Ulceration with granulation tissue in the base  There is an area of erythema with scaling at the edge  This is slightly larger than when previously seen  No fluctuance or definite cellulitis  There is induration around the edges and beneath the ulcer which is chronic  Skin:     General: Skin is warm and dry  Findings: Wound present        Comments:      Neurological:      Mental Status: He is alert and oriented to person, place, and time  Motor: Weakness (Paraplegia) present  Psychiatric:         Attention and Perception: Attention normal          Mood and Affect: Mood and affect normal          Behavior: Behavior is cooperative  Thought Content: Thought content normal          Cognition and Memory: Cognition normal              Wound 03/24/22 Pressure Injury Ankle Left;Medial (Active)   Wound Image Images linked 04/18/22 1436   Wound Description Pink;Slough; Epithelialization 04/18/22 1436   Chicho-wound Assessment Pink;Purple 04/18/22 1436   Wound Length (cm) 0 9 cm 04/18/22 1436   Wound Width (cm) 1 6 cm 04/18/22 1436   Wound Depth (cm) 0 1 cm 04/18/22 1436   Wound Surface Area (cm^2) 1 44 cm^2 04/18/22 1436   Wound Volume (cm^3) 0 144 cm^3 04/18/22 1436   Calculated Wound Volume (cm^3) 0 14 cm^3 04/18/22 1436   Change in Wound Size % 69 57 04/18/22 1436   Drainage Amount Moderate 04/18/22 1436   Drainage Description Serous 04/18/22 1436   Non-staged Wound Description Full thickness 04/18/22 1436   Treatments Cleansed 04/18/22 1436   Patient Tolerance Tolerated well 04/18/22 1436           Results from last 6 Months   Lab Units 03/29/22  1243   WOUND CULTURE  No growth           Wound Instructions:  Orders Placed This Encounter   Procedures    Wound cleansing and dressings     Wash your hands with soap and water  Remove old dressing, discard into plastic bag and place in trash  Cleanse the left ankle ulcer with soap (unscented Dove) and water prior to applying a clean dressing  Do not use tissue or cotton balls  Do not scrub the wound  Pat dry using gauze  Shower yes, keep dressing clean and dry   If dressing gets wet change dressing as soon as possible       Apply Betamethasone to chicho ulcer daily  Apply alginate to the left ankle ulcer  Cover with allevyn bordered foam  Change dressing three times per week and as needed for excessive drainage or leakage  This was done today   May apply ace wrap from base of toes to below knee at home  Elastic Tubular Stocking     Tubular elastic bandage: Apply from base of toes to behind the knee  Apply in AM, may remove for sleep  Avoid prolonged standing in one place  Elevate leg(s) above the level of the heart when sitting or as much as possible  [facility] [address] [facility]     Standing Status:   Future     Standing Expiration Date:   4/18/2023       Froylan Branham MD, CHT, CWS       Portions of the record may have been created with voice recognition software  Occasional wrong word or "sound alike" substitutions may have occurred due to the inherent limitations of voice recognition software  Read the chart carefully and recognize, using context, where substitutions have occurred

## 2022-05-02 ENCOUNTER — OFFICE VISIT (OUTPATIENT)
Dept: WOUND CARE | Facility: CLINIC | Age: 52
End: 2022-05-02
Payer: OTHER MISCELLANEOUS

## 2022-05-02 VITALS
SYSTOLIC BLOOD PRESSURE: 129 MMHG | TEMPERATURE: 97.6 F | RESPIRATION RATE: 20 BRPM | DIASTOLIC BLOOD PRESSURE: 80 MMHG | HEART RATE: 83 BPM

## 2022-05-02 DIAGNOSIS — L89.523 PRESSURE INJURY OF LEFT ANKLE, STAGE 3 (HCC): Primary | ICD-10-CM

## 2022-05-02 DIAGNOSIS — G82.20 PARAPLEGIA (HCC): ICD-10-CM

## 2022-05-02 PROCEDURE — 99213 OFFICE O/P EST LOW 20 MIN: CPT | Performed by: FAMILY MEDICINE

## 2022-05-02 NOTE — PROGRESS NOTES
Patient ID: Florencia Martin is a 49 y o  male Date of Birth 1970       Chief Complaint   Patient presents with    Follow Up Wound Care Visit     Left medial ankle  Patient did not like how the dressing was sticking to the wound  Patient stated he is apply Icthammol 10% Ointment       Allergies:  Codeine, Dilaudid [hydromorphone], and Morphine and related    Diagnosis:   Diagnosis ICD-10-CM Associated Orders   1  Pressure injury of left ankle, stage 3 (Former[facility])  L89 523 Wound cleansing and dressings   2  Paraplegia (Formerly [facility])  G82 20 Wound cleansing and dressings        Assessment  & Plan:     Stage III pressure injury of the left ankle  Slightly improved  Continue with black drawing salve  Subjective:   9/27/21 Pt is a 49 y o  paraplegic M who presents for initial evaluation of wound on medial aspect of LLE and wound on Rt second toe and abrasion on lateral aspect of RLE  Pt states LLE wound has been reoccurring since 2014  Pt was seen 2 years ago at [facility] wound center but since then has been adamant on not returning  Pt has been applying Dermawound (betadine based) to wound and covering it dry dressing  Pt denies any drainage, fever or chills  10/4/21:  Patient returns regarding his pressure injury of the left medial malleolus  He continues using his Dermawound cream   He had been treated with two courses of antibiotics prior to coming here  One was doxycycline and the other clindamycin  He states that really had no effect  Other spots on the legs have stopped draining  11/1/21:  Patient returns after hospitalization for COVID-19  He states that he is doing well now but had a rough time in the hospital   He complains of both legs being swollen the right worse than the left  He received a Prevelon type boot to offload his left ankle but it appears that it is missing velcro to tighten the straps  He states that he has been using the Dermawound ointment with the exception while hospitalized  Even though the ulcer of the left medial ankle has gotten much larger, he believes that the base has improved  He believes that this is good  In the past, he has been very resistant to any recommendations that I had from a wound care perspective  I gave him time to continue using his ointment  11/15/21: Followup pressure injury 3 of the left medial ankle  He continue using his Dermawound ointment  He has been treated recently for cellulitis and UTI  Two round of antibiotic, 1st being Levaquin and the 2nd Keflex  Cellulitis was slightly atypical with erythema of both lower extremities and increased edema  11/21/21: Followup stage III pressure injury of the left medial ankle  Continues with his DermaWound ointment  No complaints  12/13/21:  Followup stage III pressure injury of the left medial ankle  Continues with dermal Wound ointment  He feels that it is slowly improving  No other new complaints  1/3/22: Followup stage III pressure injury of the left medial ankle  Continues to use Dermawound  Notes that it is significantly improved  May be due to using thicker socks in his boots  1/24/22: Followup stage III pressure injury of the left medial malleolus  Continues with Dermawound  Patient notes improvement  No other symptoms  2/14/22: Followup stage III pressure injury of the left medial malleolus  Continues with the Dermawound  He continues to see improvement  No complaints  3/24/22: With chronic recurrent pressure ulcer in dependent, edematous leg  Patient elevates at night, but dependent in the day, will not allow wrap, but was provided with an ace wrap so that he can wrap it himself as he sees fit  3/29/22:  Patient returns regarding his stage III pressure injury of his left medial malleolus  Saw Dr Sabi Muhammad last week who debrided and started Acticoat 3  He notes since using the current dressing, the ulcer has improved significantly    He is still concerned about an area of erythema adjacent to the open ulcer that started about two days ago  He believes is increasing  He has not had any increased warmth at the site, fever or chills  4/18/22: Followup stage III pressure injury of the left medial ankle  At last visit, culture was obtained in there is no growth  Acticoat was prescribed which he continues to use  However recently, he noted deterioration and slight increased drainage  5/2/22: Followup stage III pressure injury of the left medial ankle  Alginate was sticking because of decreased drainage  He did not like that therefore he is now using black drawing salve and gauze  In addition, he thought that the rim surrounding the ulcer was edematous and had material that need to be expressed  Therefore squeezed very hard around it and expressed clear fluid  He believes that this help  However, he questions why there is persisting rim of erythema                The following portions of the patient's history were reviewed and updated as appropriate:   Patient Active Problem List   Diagnosis    [facility]rial fibrillation with RVR ([facility]    Multifocal pneumonia    Sepsis due to pneumonia ([facility]    Paraplegia ([facility]    Pneumonia due to COVID-19 virus    Acute respiratory failure with hypoxia ([facility]    Pressure injury of left ankle, stage 3 (HCC)    Edema of both legs     Past Medical History:   Diagnosis Date    Back injuries     Neurogenic bladder     Paraplegia ([facility]     Renal disorder      Past Surgical History:   Procedure Laterality Date    BACK SURGERY      NEPHRECTOMY       Family History   Problem Relation Age of Onset    Cancer Brother      Social History     Socioeconomic History    Marital status: Single     Spouse name: None    Number of children: None    Years of education: None    Highest education level: None   Occupational History    None   Tobacco Use    Smoking status: Never Smoker    Smokeless tobacco: Never Used   Vaping Use    Vaping Use: Never used   Substance and Sexual Activity    Alcohol use: Never    Drug use: No    Sexual activity: None   Other Topics Concern    None   Social History Narrative    None     Social Determinants of Health     Financial Resource Strain: Not on file   Food Insecurity: Not on file   Transportation Needs: Not on file   Physical Activity: Not on file   Stress: Not on file   Social Connections: Not on file   Intimate Partner Violence: Not on file   Housing Stability: Not on file       Current Outpatient Medications:     albuterol (Ventolin HFA) 90 mcg/act inhaler, Inhale 2 puffs every 6 (six) hours as needed for wheezing (Patient not taking: Reported on 3/24/2022 ), Disp: 18 g, Rfl: 0    apixaban (ELIQUIS) 2 5 mg, Take 1 tablet (2 5 mg total) by mouth 2 (two) times a day (Patient not taking: Reported on 3/24/2022 ), Disp: 60 tablet, Rfl: 0    ascorbic acid (VITAMIN C) 1000 MG tablet, Take 1 tablet (1,000 mg total) by mouth 2 (two) times a day (Patient not taking: Reported on 3/24/2022 ), Disp: 30 tablet, Rfl: 0    cholecalciferol (VITAMIN D3) 1,000 units tablet, Take 2 tablets (2,000 Units total) by mouth daily (Patient not taking: Reported on 3/24/2022 ), Disp: 30 tablet, Rfl: 0    dexamethasone (DECADRON) 6 mg tablet, Take 1 tablet (6 mg total) by mouth daily with breakfast (Patient not taking: Reported on 3/24/2022 ), Disp: 6 tablet, Rfl: 0    diazepam (VALIUM) 5 mg tablet, Take 10 mg by mouth daily at bedtime as needed for anxiety , Disp: , Rfl:     gabapentin (NEURONTIN) 400 mg capsule, Take 400 mg by mouth 3 (three) times a day, Disp: , Rfl:     guaiFENesin (MUCINEX) 600 mg 12 hr tablet, Take 1 tablet (600 mg total) by mouth every 12 (twelve) hours (Patient not taking: Reported on 3/24/2022 ), Disp: 60 tablet, Rfl: 0    ibuprofen (MOTRIN) 400 mg tablet, Take 1 tablet (400 mg total) by mouth every 6 (six) hours as needed for mild pain (Patient not taking: Reported on 3/24/2022 ), Disp: 30 tablet, Rfl: 0    oxybutynin (DITROPAN-XL) 10 MG 24 hr tablet, Take 10 mg by mouth 2 (two) times a day, Disp: , Rfl:     TiZANidine (ZANAFLEX) 4 MG capsule, Take 4 mg by mouth daily at bedtime, Disp: , Rfl:     triamcinolone (KENALOG) 0 5 % cream, Apply topically 2 (two) times a day, Disp: 30 g, Rfl: 0    zinc sulfate (ZINCATE) 220 mg capsule, Take 1 capsule (220 mg total) by mouth 2 (two) times a day (Patient not taking: Reported on 3/24/2022 ), Disp: 60 capsule, Rfl: 0    Review of Systems   Constitutional: Negative for appetite change, chills, fatigue, fever and unexpected weight change  HENT: Negative for congestion, hearing loss, postnasal drip and sinus pressure  Eyes: Negative for discharge and visual disturbance  Cardiovascular: Positive for leg swelling (Right greater than left)  Endocrine: Negative  Genitourinary: Positive for difficulty urinating  Musculoskeletal: Positive for gait problem (paraplegia)  Negative for back pain  Skin: Positive for wound (LLE medial aspect of the ankle)  Negative for rash  Allergic/Immunologic: Negative  Neurological: Positive for weakness (Paraplegia) and numbness (Lower extremities)  Negative for dizziness, tremors, seizures and headaches  Hematological: Does not bruise/bleed easily  Psychiatric/Behavioral: Negative  Negative for dysphoric mood  The patient is not nervous/anxious  Objective:  /80   Pulse 83   Temp 97 6 °F (36 4 °C)   Resp 20   Pain Score: 0-No pain (at wound site)     Physical Exam  Vitals and nursing note reviewed  Constitutional:       Appearance: Normal appearance  He is well-developed and normal weight  HENT:      Head: Normocephalic and atraumatic  Cardiovascular:      Pulses:           Dorsalis pedis pulses are 2+ on the left side  Posterior tibial pulses are 2+ on the left side  Pulmonary:      Effort: Pulmonary effort is normal    Musculoskeletal:      Right lower leg: 3+ Edema present  Left lower le+ Edema present  Feet:    Feet:      Comments:  Ulceration with granulation tissue in the base  There is an area of erythema  at the edge  No fluctuance or definite cellulitis  There is induration around the edges and beneath the ulcer which is chronic  Skin:     General: Skin is warm and dry  Findings: Wound present  Comments:      Neurological:      Mental Status: He is alert and oriented to person, place, and time  Motor: Weakness (Paraplegia) present  Psychiatric:         Attention and Perception: Attention normal          Mood and Affect: Mood and affect normal          Behavior: Behavior is cooperative  Thought Content: Thought content normal          Cognition and Memory: Cognition normal               Wound 22 Pressure Injury Ankle Left;Medial (Active)   Wound Image Images linked 22 1502   Wound Description Pink;Slough 22 1502   Tory-wound Assessment Edema;Purple;Pink 22 1502   Wound Length (cm) 0 5 cm 22 1502   Wound Width (cm) 0 9 cm 22 1502   Wound Depth (cm) 0 1 cm 22 1502   Wound Surface Area (cm^2) 0 45 cm^2 22 1502   Wound Volume (cm^3) 0 045 cm^3 22 1502   Calculated Wound Volume (cm^3) 0 04 cm^3 22 1502   Change in Wound Size % 91 3 22 1502   Drainage Amount Moderate 22 1502   Drainage Description Serous 22 1502   Non-staged Wound Description Full thickness 22 1502   Treatments Cleansed 22 1502   Patient Tolerance Tolerated well 22 1502           Results from last 6 Months   Lab Units 22  1243   WOUND CULTURE  No growth           Wound Instructions:  Orders Placed This Encounter   Procedures    Wound cleansing and dressings     Wash your hands with soap and water  Remove old dressing, discard into plastic bag and place in trash  Cleanse the left ankle ulcer with soap (unscented Dove) and water prior to applying a clean dressing   Do not use tissue or cotton balls  Do not scrub the wound  Pat dry using gauze  Shower yes, keep dressing clean and dry   If dressing gets wet change dressing as soon as possible       Apply Betamethasone to chicho ulcer daily  Apply Ichthammol 10% Ointment to the left ankle ulcer  Cover with allevyn bordered foam  Change dressing daily and as needed for excessive drainage or leakage  May apply ace wrap from base of toes to below knee at home  Elastic Tubular Stocking     Tubular elastic bandage: Apply from base of toes to behind the knee  Apply in AM, may remove for sleep  Avoid prolonged standing in one place  Elevate leg(s) above the level of the heart when sitting or as much as possible  [facility]    Medihoney applied today     Standing Status:   Future     Standing Expiration Date:   5/2/2023       Taylor Clark MD, CHT, CWS       Portions of the record may have been created with voice recognition software  Occasional wrong word or "sound alike" substitutions may have occurred due to the inherent limitations of voice recognition software  Read the chart carefully and recognize, using context, where substitutions have occurred

## 2022-05-02 NOTE — PATIENT INSTRUCTIONS
Orders Placed This Encounter   Procedures    Wound cleansing and dressings     Wash your hands with soap and water  Remove old dressing, discard into plastic bag and place in trash  Cleanse the left ankle ulcer with soap (unscented Dove) and water prior to applying a clean dressing  Do not use tissue or cotton balls  Do not scrub the wound  Pat dry using gauze  Shower yes, keep dressing clean and dry   If dressing gets wet change dressing as soon as possible       Apply Betamethasone to chicho ulcer daily  Apply Ichthammol 10% Ointment to the left ankle ulcer  Cover with allevyn bordered foam  Change dressing daily and as needed for excessive drainage or leakage  May apply ace wrap from base of toes to below knee at home  Elastic Tubular Stocking     Tubular elastic bandage: Apply from base of toes to behind the knee  Apply in AM, may remove for sleep  Avoid prolonged standing in one place  Elevate leg(s) above the level of the heart when sitting or as much as possible  [facility] [facility]    Medihoney applied today     Standing Status:   Future     Standing Expiration Date:   5/2/2023   Wound Healing and Your Diet   WHAT YOU NEED TO KNOW:   Your body uses nutrients from healthy foods to heal wounds caused by injury, surgery, or pressure injuries  There is no special diet that will heal your wound, but a healthy meal plan can help your wound heal faster  Nutrients that are important for healing are protein, zinc, and vitamin C  Liquids are also important for wound healing  DISCHARGE INSTRUCTIONS:   Follow a healthy meal plan:   · Eat a variety of foods from each food group every day  Eat regular meals and snacks to help you get enough calories and nutrients  If you have trouble eating 3 meals each day, eat 5 to 6 small meals throughout the day instead  Include good sources of protein, zinc, and vitamin C each day  · Drink liquids as directed    Drink plenty of liquids during and between meals, unless your healthcare provider has told you to limit liquids  Ask your healthcare provider or dietitian how much liquid to drink each day and which liquids are best for you  · Limit unhealthy foods,  such as those that are high in fat, sugar, and salt  Examples include doughnuts, cookies, fried foods, candy, and regular soda  These kinds of foods are low in nutrients that are important for healing  Good sources of protein:  Your dietitian will tell you how much protein and how many calories you need each day  The average amount of protein in foods is listed below in grams (g)  To find the exact amount of protein in a food, read the food labels on packaged items  · Dairy:      ? 1 cup of any type of milk (8 g)    ? ½ cup of evaporated canned milk (9 g)    ? ¼ cup of nonfat dry milk (11 g)    ? 1 ounce of semi-hard or solid cheese (7 g)    ? ¼ cup of parmesan cheese (8 g)    ? ½ cup of cottage cheese (14 g)    ? ½ cup of pudding (4 g)    ? 1 cup of plain or fruit yogurt (8 g)    · Meats and meat substitutes:      ? 3 ounces of cooked freshwater fish (21 g)    ? 3 ounces of cooked shellfish (19 g)    ? ½ cup of canned tuna (14 g)    ? 3 ounces of cooked chicken, turkey, or other poultry (24 g)    ? 3 ounces of cooked beef, pork, lamb, or other red meat (21 g)    ? 1 large egg (6 g)    ? ¼ cup of fat-free egg substitute (5 g)    ? ½ cup of tofu or tempeh (10 g)    ? 1 cup of cooked dried beans, such as cooper, kidney, or navy (15 g)    · Nuts and seeds:      ? 2 tablespoons of almonds, cashews, sunflower seeds, or walnuts (5 g)    ? 2 tablespoons of peanuts (7 g)    ? 2 tablespoons of peanut butter (8 g)       How to add extra protein:   · Add powdered milk to milk, cereals, scrambled eggs, soups, and casseroles  · Add cheese to sauces, soups, or vegetables  · Add eggs to tuna, salads, sauces, or casseroles      · Add nutrition supplements and breakfast drink mixes to milk or shakes  · Add nuts to foods or eat them as snacks  · Add meat (beef, chicken, or pork) to soups, casseroles, pasta dishes, or vegetables  · Add beans, peas, and other legumes to salads  · Eat cottage cheese or yogurt with fruit  Good sources of vitamin C:  Vitamin C is found in fruits and vegetables  Fruits such as oranges, strawberries, grapefruit, cantaloupe, and tangerines are good sources of vitamin C  Red and green bell peppers, broccoli, potatoes, tomatoes, and cabbage are also high in vitamin C        Good sources of zinc:  Good sources of zinc are beef, liver, and crab  Smaller amounts of zinc are found in sunflower seeds, almonds, peanut butter, eggs, and milk  Other foods that contain zinc include wheat germ, black-eyed peas, and whole-grain products  How to add extra calories to foods: Your dietitian may recommend that you add extra calories to your meals if you are not eating enough  You can increase calories by adding butter, margarine, sugar, or jam to foods  Work with your dietitian if you have other medical conditions and need to follow a special diet  What else you should know about nutrients and wound healing: Your healthcare provider or dietitian may recommend vitamin and nutrition supplements if your body is low in certain nutrients  If your dietitian recommends a liquid nutrition supplement, take it between meals  Ask your healthcare provider which supplements are right for you  © Copyright [employer] 2022 Information is for End User's use only and may not be sold, redistributed or otherwise used for commercial purposes  All illustrations and images included in CareNotes® are the copyrighted property of [employer]  or Jolanta Weber   The above information is an  only  It is not intended as medical advice for individual conditions or treatments   Talk to your doctor, nurse or pharmacist before following any medical regimen to see if it is safe and effective for you

## 2022-05-16 ENCOUNTER — OFFICE VISIT (OUTPATIENT)
Dept: WOUND CARE | Facility: CLINIC | Age: 52
End: 2022-05-16
Payer: OTHER MISCELLANEOUS

## 2022-05-16 VITALS
SYSTOLIC BLOOD PRESSURE: 108 MMHG | RESPIRATION RATE: 18 BRPM | DIASTOLIC BLOOD PRESSURE: 70 MMHG | TEMPERATURE: 97 F | HEART RATE: 65 BPM

## 2022-05-16 DIAGNOSIS — G82.20 PARAPLEGIA (HCC): ICD-10-CM

## 2022-05-16 DIAGNOSIS — L89.523 PRESSURE INJURY OF LEFT ANKLE, STAGE 3 (HCC): Primary | ICD-10-CM

## 2022-05-16 PROCEDURE — 99213 OFFICE O/P EST LOW 20 MIN: CPT | Performed by: FAMILY MEDICINE

## 2022-05-16 NOTE — PROGRESS NOTES
Patient ID: Donell Bess is a 49 y o  male Date of Birth 1970       Chief Complaint   Patient presents with    Follow Up Wound Care Visit     Follow up left ankle wound       Allergies:  Codeine, Dilaudid [hydromorphone], and Morphine and related    Diagnosis:      Diagnosis ICD-10-CM Associated Orders   1  Pressure injury of left ankle, stage 3 ([facility])  L89 523 Wound cleansing and dressings   2  Paraplegia ([facility])  G82 20 Wound cleansing and dressings           Assessment & Plan:   Pressure injury stage 3 of L medial ankle has decreased in size from last visit, therefore pt can continue using the black drawing salve   If area of induration surrounding area of hyperpigmentation and induration does not improve by next visit, we can consider biopsy   Pt will monitor for any changes or deterioration in wound and f/u sooner if needed; currently f/u planned for 3 weeks   Continue to offload  Subjective:   9/27/21 Pt is a 49 y o  paraplegic M who presents for initial evaluation of wound on medial aspect of LLE and wound on Rt second toe and abrasion on lateral aspect of RLE  Pt states LLE wound has been reoccurring since 2014  Pt was seen 2 years ago at [facility] wound center but since then has been adamant on not returning  Pt has been applying Dermawound (betadine based) to wound and covering it dry dressing  Pt denies any drainage, fever or chills  10/4/21:  Patient returns regarding his pressure injury of the left medial malleolus  He continues using his Dermawound cream   He had been treated with two courses of antibiotics prior to coming here  One was doxycycline and the other clindamycin  He states that really had no effect  Other spots on the legs have stopped draining  11/1/21:  Patient returns after hospitalization for COVID-19  He states that he is doing well now but had a rough time in the hospital   He complains of both legs being swollen the right worse than the left    He received a Prevelon type boot to offload his left ankle but it appears that it is missing velcro to tighten the straps  He states that he has been using the Dermawound ointment with the exception while hospitalized  Even though the ulcer of the left medial ankle has gotten much larger, he believes that the base has improved  He believes that this is good  In the past, he has been very resistant to any recommendations that I had from a wound care perspective  I gave him time to continue using his ointment  11/15/21: Followup pressure injury 3 of the left medial ankle  He continue using his Dermawound ointment  He has been treated recently for cellulitis and UTI  Two round of antibiotic, 1st being Levaquin and the 2nd Keflex  Cellulitis was slightly atypical with erythema of both lower extremities and increased edema  11/21/21: Followup stage III pressure injury of the left medial ankle  Continues with his DermaWound ointment  No complaints  12/13/21:  Followup stage III pressure injury of the left medial ankle  Continues with dermal Wound ointment  He feels that it is slowly improving  No other new complaints  1/3/22: Followup stage III pressure injury of the left medial ankle  Continues to use Dermawound  Notes that it is significantly improved  May be due to using thicker socks in his boots  1/24/22: Followup stage III pressure injury of the left medial malleolus  Continues with Dermawound  Patient notes improvement  No other symptoms  2/14/22: Followup stage III pressure injury of the left medial malleolus  Continues with the Dermawound  He continues to see improvement  No complaints  3/24/22: With chronic recurrent pressure ulcer in dependent, edematous leg  Patient elevates at night, but dependent in the day, will not allow wrap, but was provided with an ace wrap so that he can wrap it himself as he sees fit      3/29/22:  Patient returns regarding his stage III pressure injury of his left medial malleolus  Saw Dr Nino Edwards last week who debrided and started Acticoat 3  He notes since using the current dressing, the ulcer has improved significantly  He is still concerned about an area of erythema adjacent to the open ulcer that started about two days ago  He believes is increasing  He has not had any increased warmth at the site, fever or chills  4/18/22: Followup stage III pressure injury of the left medial ankle  At last visit, culture was obtained in there is no growth  Acticoat was prescribed which he continues to use  However recently, he noted deterioration and slight increased drainage  5/2/22: Followup stage III pressure injury of the left medial ankle  Alginate was sticking because of decreased drainage  He did not like that therefore he is now using black drawing salve and gauze  In addition, he thought that the rim surrounding the ulcer was edematous and had material that need to be expressed  Therefore squeezed very hard around it and expressed clear fluid  He believes that this help  However, he questions why there is persisting rim of erythema  5/16/22: Followup stage III pressure injury L medial ankle  He continues to use black drawing salve  States the darker rim around the wound is somewhat improved with the steroid ointment and has just started to reuse the steroid ointment two days ago  Denies fever or chills  No increase in pain or drainage from wound           The following portions of the patient's history were reviewed and updated as appropriate:   Patient Active Problem List   Diagnosis    Atrial fibrillation with RVR ([facility] [facility]    Multifocal pneumonia    Sepsis due to pneumonia ([facility] [facility]    Paraplegia [facility]    Pneumonia due to COVID-19 virus    Acute respiratory failure with hypoxia ([facility] [address] [facility]    Pressure injury of left ankle, stage 3 (HCC)    Edema of both legs     Past Medical History:   Diagnosis Date    Back injuries     Neurogenic bladder     Paraplegia (HCC)     Renal disorder      Past Surgical History:   Procedure Laterality Date    BACK SURGERY      NEPHRECTOMY       Family History   Problem Relation Age of Onset    Cancer Brother       Social History     Socioeconomic History    Marital status: Single     Spouse name: None    Number of children: None    Years of education: None    Highest education level: None   Occupational History    None   Tobacco Use    Smoking status: Never Smoker    Smokeless tobacco: Never Used   Vaping Use    Vaping Use: Never used   Substance and Sexual Activity    Alcohol use: Never    Drug use: No    Sexual activity: None   Other Topics Concern    None   Social History Narrative    None     Social Determinants of Health     Financial Resource Strain: Not on file   Food Insecurity: Not on file   Transportation Needs: Not on file   Physical Activity: Not on file   Stress: Not on file   Social Connections: Not on file   Intimate Partner Violence: Not on file   Housing Stability: Not on file        Current Outpatient Medications:     albuterol (Ventolin HFA) 90 mcg/act inhaler, Inhale 2 puffs every 6 (six) hours as needed for wheezing (Patient not taking: Reported on 3/24/2022 ), Disp: 18 g, Rfl: 0    apixaban (ELIQUIS) 2 5 mg, Take 1 tablet (2 5 mg total) by mouth 2 (two) times a day (Patient not taking: Reported on 3/24/2022 ), Disp: 60 tablet, Rfl: 0    ascorbic acid (VITAMIN C) 1000 MG tablet, Take 1 tablet (1,000 mg total) by mouth 2 (two) times a day (Patient not taking: Reported on 3/24/2022 ), Disp: 30 tablet, Rfl: 0    cholecalciferol (VITAMIN D3) 1,000 units tablet, Take 2 tablets (2,000 Units total) by mouth daily (Patient not taking: Reported on 3/24/2022 ), Disp: 30 tablet, Rfl: 0    dexamethasone (DECADRON) 6 mg tablet, Take 1 tablet (6 mg total) by mouth daily with breakfast (Patient not taking: Reported on 3/24/2022 ), Disp: 6 tablet, Rfl: 0    diazepam (VALIUM) 5 mg tablet, Take 10 mg by mouth daily at bedtime as needed for anxiety , Disp: , Rfl:     gabapentin (NEURONTIN) 400 mg capsule, Take 400 mg by mouth 3 (three) times a day, Disp: , Rfl:     guaiFENesin (MUCINEX) 600 mg 12 hr tablet, Take 1 tablet (600 mg total) by mouth every 12 (twelve) hours (Patient not taking: Reported on 3/24/2022 ), Disp: 60 tablet, Rfl: 0    ibuprofen (MOTRIN) 400 mg tablet, Take 1 tablet (400 mg total) by mouth every 6 (six) hours as needed for mild pain (Patient not taking: Reported on 3/24/2022 ), Disp: 30 tablet, Rfl: 0    oxybutynin (DITROPAN-XL) 10 MG 24 hr tablet, Take 10 mg by mouth 2 (two) times a day, Disp: , Rfl:     TiZANidine (ZANAFLEX) 4 MG capsule, Take 4 mg by mouth daily at bedtime, Disp: , Rfl:     triamcinolone (KENALOG) 0 5 % cream, Apply topically 2 (two) times a day, Disp: 30 g, Rfl: 0    zinc sulfate (ZINCATE) 220 mg capsule, Take 1 capsule (220 mg total) by mouth 2 (two) times a day (Patient not taking: Reported on 3/24/2022 ), Disp: 60 capsule, Rfl: 0    Review of Systems   Constitutional: Negative for appetite change, chills, fatigue, fever and unexpected weight change  HENT: Negative for congestion, hearing loss, postnasal drip and sinus pressure  Eyes: Negative for discharge and visual disturbance  Cardiovascular: Positive for leg swelling (Right greater than left)  Endocrine: Negative  Genitourinary: Positive for difficulty urinating  Musculoskeletal: Positive for gait problem (paraplegia)  Negative for back pain  Skin: Positive for wound (LLE medial aspect of the ankle)  Negative for rash  Allergic/Immunologic: Negative  Neurological: Positive for weakness (Paraplegia) and numbness (Lower extremities)  Negative for dizziness, tremors, seizures and headaches  Hematological: Does not bruise/bleed easily  Psychiatric/Behavioral: Negative  Negative for dysphoric mood  The patient is not nervous/anxious          Objective:  /70 Pulse 65   Temp (!) 97 °F (36 1 °C)   Resp 18   Pain Score: 0-No pain     Physical Exam  Vitals and nursing note reviewed  Constitutional:       Appearance: Normal appearance  He is well-developed and normal weight  HENT:      Head: Normocephalic and atraumatic  Cardiovascular:      Pulses:           Dorsalis pedis pulses are 2+ on the left side  Posterior tibial pulses are 2+ on the left side  Pulmonary:      Effort: Pulmonary effort is normal    Musculoskeletal:      Right lower leg: 3+ Edema present  Left lower le+ Edema present  Feet:    Feet:      Comments:  Ulceration with fibrinous tissue in the base  No fluctuance or definite cellulitis  There is a hyperpigmented area of induration surrounding the wound and beneath the ulcer which is chronic  No drainage  No malodor  No lymphangitic streaking  Skin:     General: Skin is warm and dry  Findings: Wound present  Comments:      Neurological:      Mental Status: He is alert and oriented to person, place, and time  Motor: Weakness (Paraplegia) present  Psychiatric:         Attention and Perception: Attention normal          Mood and Affect: Mood and affect normal          Behavior: Behavior is cooperative  Thought Content: Thought content normal          Cognition and Memory: Cognition normal                        Results from last 6 Months   Lab Units 22  1243   WOUND CULTURE  No growth       Wound Instructions:  Orders Placed This Encounter   Procedures    Wound cleansing and dressings     Wash your hands with soap and water  Remove old dressing, discard into plastic bag and place in trash  Cleanse the left ankle ulcer with soap (unscented Dove) and water prior to applying a clean dressing  Do not use tissue or cotton balls  Do not scrub the wound   Pat dry using gauze       Shower yes, keep dressing clean and dry   If dressing gets wet change dressing as soon as possible        Apply Betamethasone to chicho ulcer daily  Triamcinolone as needed to periwound for rash at home      Apply Ichthammol 10% Ointment to the left ankle ulcer  Cover with allevyn bordered foam  Change dressing daily and as needed for excessive drainage or leakage  May apply ace wrap from base of toes to below knee at home       Elastic Tubular Stocking      Tubular elastic bandage: Apply from base of toes to behind the knee  Apply in AM, may remove for sleep       Avoid prolonged standing in one place       Elevate leg(s) above the level of the heart when sitting or as much as possible       [facility] [facility]     Medihoney applied today  Standing Status:   Future     Standing Expiration Date:   5/16/2023           Loyd Caicedo MD, CHT, CWS    Portions of the record may have been created with voice recognition software  Occasional wrong word or "sound alike" substitutions may have occurred due to the inherent limitations of voice recognition software  Read the chart carefully and recognize, using context, where substitutions have occurred

## 2022-05-16 NOTE — PATIENT INSTRUCTIONS
Orders Placed This Encounter   Procedures    Wound cleansing and dressings     Wash your hands with soap and water  Remove old dressing, discard into plastic bag and place in trash  Cleanse the left ankle ulcer with soap (unscented Dove) and water prior to applying a clean dressing  Do not use tissue or cotton balls  Do not scrub the wound  Pat dry using gauze  Shower yes, keep dressing clean and dry  If dressing gets wet change dressing as soon as possible        Apply Betamethasone to chicho ulcer daily  Triamcinolone as needed to periwound for rash at home  Apply Ichthammol 10% Ointment to the left ankle ulcer  Cover with allevyn bordered foam  Change dressing daily and as needed for excessive drainage or leakage  May apply ace wrap from base of toes to below knee at home  Elastic Tubular Stocking      Tubular elastic bandage: Apply from base of toes to behind the knee  Apply in AM, may remove for sleep  Avoid prolonged standing in one place  Elevate leg(s) above the level of the heart when sitting or as much as possible  Follow up at [address] [facility] in THREE weeks     Medihoney applied today       Standing Status:   Future     Standing Expiration Date:   5/16/2023

## 2022-06-06 ENCOUNTER — OFFICE VISIT (OUTPATIENT)
Dept: WOUND CARE | Facility: CLINIC | Age: 52
End: 2022-06-06
Payer: OTHER MISCELLANEOUS

## 2022-06-06 VITALS
HEART RATE: 86 BPM | DIASTOLIC BLOOD PRESSURE: 96 MMHG | RESPIRATION RATE: 20 BRPM | TEMPERATURE: 97.5 F | SYSTOLIC BLOOD PRESSURE: 147 MMHG

## 2022-06-06 DIAGNOSIS — G82.20 PARAPLEGIA (HCC): ICD-10-CM

## 2022-06-06 DIAGNOSIS — L89.523 PRESSURE INJURY OF LEFT ANKLE, STAGE 3 (HCC): Primary | ICD-10-CM

## 2022-06-06 PROCEDURE — 97597 DBRDMT OPN WND 1ST 20 CM/<: CPT | Performed by: STUDENT IN AN ORGANIZED HEALTH CARE EDUCATION/TRAINING PROGRAM

## 2022-06-06 PROCEDURE — 97597 DBRDMT OPN WND 1ST 20 CM/<: CPT | Performed by: FAMILY MEDICINE

## 2022-06-06 RX ORDER — FLUCONAZOLE 100 MG/1
100 TABLET ORAL DAILY
COMMUNITY
Start: 2022-05-31 | End: 2022-06-10

## 2022-06-06 NOTE — PROGRESS NOTES
Patient ID: Freeman Jeans is a 49 y o  male Date of Birth 1970       Chief Complaint   Patient presents with    Follow Up Wound Care Visit     Left ankle ulcer       Allergies:  Codeine, Dilaudid [hydromorphone], and Morphine and related    Diagnosis:   Diagnosis ICD-10-CM Associated Orders   1  Pressure injury of left ankle, stage 3 ([facility])  L89 523 Wound cleansing and dressings     Debridement   2  Paraplegia ([facility])  G82 20 Wound cleansing and dressings        Assessment  & Plan:     Followup stage 3 pressure injury of L ankle  Healing has stalled  o Selective debridement performed    o Most progress seemed to have been made with alginate and bordered foam, therefore will return to this plan    o Declined biopsy today d/t issues with insurance coverage  Once the coverage problems have been worked out can consider biopsy in future if healing remains stalled    o F/u in 3 weeks  o Patient still has vitamin-D level to be drawn since the fall of last year  Above-knee just recently restarted taking the supplement  Subjective:   9/27/21 Pt is a 49 y o  paraplegic M who presents for initial evaluation of wound on medial aspect of LLE and wound on Rt second toe and abrasion on lateral aspect of RLE  Pt states LLE wound has been reoccurring since 2014  Pt was seen 2 years ago at [facility] wound center but since then has been adamant on not returning  Pt has been applying Dermawound (betadine based) to wound and covering it dry dressing  Pt denies any drainage, fever or chills  10/4/21:  Patient returns regarding his pressure injury of the left medial malleolus  He continues using his Dermawound cream   He had been treated with two courses of antibiotics prior to coming here  One was doxycycline and the other clindamycin  He states that really had no effect  Other spots on the legs have stopped draining  11/1/21:  Patient returns after hospitalization for COVID-19    He states that he is doing well now but had a rough time in the hospital   He complains of both legs being swollen the right worse than the left  He received a Prevelon type boot to offload his left ankle but it appears that it is missing velcro to tighten the straps  He states that he has been using the Dermawound ointment with the exception while hospitalized  Even though the ulcer of the left medial ankle has gotten much larger, he believes that the base has improved  He believes that this is good  In the past, he has been very resistant to any recommendations that I had from a wound care perspective  I gave him time to continue using his ointment  11/15/21: Followup pressure injury 3 of the left medial ankle  He continue using his Dermawound ointment  He has been treated recently for cellulitis and UTI  Two round of antibiotic, 1st being Levaquin and the 2nd Keflex  Cellulitis was slightly atypical with erythema of both lower extremities and increased edema  11/21/21: Followup stage III pressure injury of the left medial ankle  Continues with his DermaWound ointment  No complaints  12/13/21:  Followup stage III pressure injury of the left medial ankle  Continues with dermal Wound ointment  He feels that it is slowly improving  No other new complaints  1/3/22: Followup stage III pressure injury of the left medial ankle  Continues to use Dermawound  Notes that it is significantly improved  May be due to using thicker socks in his boots  1/24/22: Followup stage III pressure injury of the left medial malleolus  Continues with Dermawound  Patient notes improvement  No other symptoms  2/14/22: Followup stage III pressure injury of the left medial malleolus  Continues with the Dermawound  He continues to see improvement  No complaints  3/24/22: With chronic recurrent pressure ulcer in dependent, edematous leg    Patient elevates at night, but dependent in the day, will not allow wrap, but was provided with an ace wrap so that he can wrap it himself as he sees fit  3/29/22:  Patient returns regarding his stage III pressure injury of his left medial malleolus  Saw Dr Camila Carreno last week who debrided and started Acticoat 3  He notes since using the current dressing, the ulcer has improved significantly  He is still concerned about an area of erythema adjacent to the open ulcer that started about two days ago  He believes is increasing  He has not had any increased warmth at the site, fever or chills  4/18/22: Followup stage III pressure injury of the left medial ankle  At last visit, culture was obtained in there is no growth  Acticoat was prescribed which he continues to use  However recently, he noted deterioration and slight increased drainage  5/2/22: Followup stage III pressure injury of the left medial ankle  Alginate was sticking because of decreased drainage  He did not like that therefore he is now using black drawing salve and gauze  In addition, he thought that the rim surrounding the ulcer was edematous and had material that need to be expressed  Therefore squeezed very hard around it and expressed clear fluid  He believes that this help  However, he questions why there is persisting rim of erythema  5/16/22: Followup stage III pressure injury L medial ankle  He continues to use black drawing salve  States the darker rim around the wound is somewhat improved with the steroid ointment and has just started to reuse the steroid ointment two days ago  Denies fever or chills  No increase in pain or drainage from wound  06/06/22: Followup stage III pressure injury of L medial ankle  Has been continuing to use black drawing salve  Healing has stalled  States that he can only sleep on his R side and therefore pressure is likely still being applied to the area  Has tried boots in the past which he feels were not effective in relieving the pressure   States his diet has been poor lately and he does not get much sun because he has been in agony  No fevers, chills           The following portions of the patient's history were reviewed and updated as appropriate:   Patient Active Problem List   Diagnosis    Atrial fibrillation with RVR ([facility] )    Multifocal pneumonia    Sepsis due to pneumonia ([facility] )    Paraplegia ([facility] )    Pneumonia due to COVID-19 virus    Acute respiratory failure with hypoxia ([facility] )    Pressure injury of left ankle, stage 3 (HCC)    Edema of both legs     Past Medical History:   Diagnosis Date    Back injuries     Neurogenic bladder     Paraplegia ([facility] )     Renal disorder      Past Surgical History:   Procedure Laterality Date    BACK SURGERY      NEPHRECTOMY       Family History   Problem Relation Age of Onset    Cancer Brother      Social History     Socioeconomic History    Marital status: Single     Spouse name: None    Number of children: None    Years of education: None    Highest education level: None   Occupational History    None   Tobacco Use    Smoking status: Never Smoker    Smokeless tobacco: Never Used   Vaping Use    Vaping Use: Never used   Substance and Sexual Activity    Alcohol use: Never    Drug use: No    Sexual activity: None   Other Topics Concern    None   Social History Narrative    None     Social Determinants of Health     Financial Resource Strain: Not on file   Food Insecurity: Not on file   Transportation Needs: Not on file   Physical Activity: Not on file   Stress: Not on file   Social Connections: Not on file   Intimate Partner Violence: Not on file   Housing Stability: Not on file       Current Outpatient Medications:     fluconazole (DIFLUCAN) 100 mg tablet, Take 100 mg by mouth daily, Disp: , Rfl:     albuterol (Ventolin HFA) 90 mcg/act inhaler, Inhale 2 puffs every 6 (six) hours as needed for wheezing (Patient not taking: Reported on 3/24/2022 ), Disp: 18 g, Rfl: 0    apixaban (ELIQUIS) 2 5 mg, Take 1 tablet (2 5 mg total) by mouth 2 (two) times a day (Patient not taking: Reported on 3/24/2022 ), Disp: 60 tablet, Rfl: 0    ascorbic acid (VITAMIN C) 1000 MG tablet, Take 1 tablet (1,000 mg total) by mouth 2 (two) times a day (Patient not taking: Reported on 3/24/2022 ), Disp: 30 tablet, Rfl: 0    cholecalciferol (VITAMIN D3) 1,000 units tablet, Take 2 tablets (2,000 Units total) by mouth daily (Patient not taking: Reported on 3/24/2022 ), Disp: 30 tablet, Rfl: 0    dexamethasone (DECADRON) 6 mg tablet, Take 1 tablet (6 mg total) by mouth daily with breakfast (Patient not taking: Reported on 3/24/2022 ), Disp: 6 tablet, Rfl: 0    diazepam (VALIUM) 5 mg tablet, Take 10 mg by mouth daily at bedtime as needed for anxiety , Disp: , Rfl:     gabapentin (NEURONTIN) 400 mg capsule, Take 400 mg by mouth 3 (three) times a day, Disp: , Rfl:     guaiFENesin (MUCINEX) 600 mg 12 hr tablet, Take 1 tablet (600 mg total) by mouth every 12 (twelve) hours (Patient not taking: Reported on 3/24/2022 ), Disp: 60 tablet, Rfl: 0    ibuprofen (MOTRIN) 400 mg tablet, Take 1 tablet (400 mg total) by mouth every 6 (six) hours as needed for mild pain (Patient not taking: Reported on 3/24/2022 ), Disp: 30 tablet, Rfl: 0    oxybutynin (DITROPAN-XL) 10 MG 24 hr tablet, Take 10 mg by mouth 2 (two) times a day, Disp: , Rfl:     TiZANidine (ZANAFLEX) 4 MG capsule, Take 4 mg by mouth daily at bedtime, Disp: , Rfl:     triamcinolone (KENALOG) 0 5 % cream, Apply topically 2 (two) times a day, Disp: 30 g, Rfl: 0    zinc sulfate (ZINCATE) 220 mg capsule, Take 1 capsule (220 mg total) by mouth 2 (two) times a day (Patient not taking: Reported on 3/24/2022 ), Disp: 60 capsule, Rfl: 0    Review of Systems   Constitutional: Negative for appetite change, chills, fatigue, fever and unexpected weight change  HENT: Negative for congestion, hearing loss, postnasal drip and sinus pressure  Eyes: Negative for discharge and visual disturbance     Cardiovascular: Positive for leg swelling (Right greater than left)  Endocrine: Negative  Genitourinary: Positive for difficulty urinating  Musculoskeletal: Positive for gait problem (paraplegia)  Negative for back pain  Skin: Positive for wound (LLE medial aspect of the ankle)  Negative for rash  Allergic/Immunologic: Negative  Neurological: Positive for weakness (Paraplegia) and numbness (Lower extremities)  Negative for dizziness, tremors, seizures and headaches  Hematological: Does not bruise/bleed easily  Psychiatric/Behavioral: Negative  The patient is not nervous/anxious  Objective:  /96   Pulse 86   Temp 97 5 °F (36 4 °C)   Resp 20   Pain Score: 0-No pain     Physical Exam  Vitals and nursing note reviewed  Constitutional:       Appearance: Normal appearance  He is well-developed and normal weight  HENT:      Head: Normocephalic and atraumatic  Eyes:      Conjunctiva/sclera: Conjunctivae normal    Cardiovascular:      Rate and Rhythm: Normal rate  Pulses:           Dorsalis pedis pulses are 2+ on the left side  Posterior tibial pulses are 2+ on the left side  Pulmonary:      Effort: Pulmonary effort is normal  No respiratory distress  Musculoskeletal:      Right lower leg: 3+ Edema present  Left lower le+ Edema present  Feet:    Feet:      Comments: Ulceration with fibrinous tissue in the base  No fluctuance or definite cellulitis  There is a hyperpigmented area of induration surrounding the wound and beneath the ulcer which is chronic  No drainage  No malodor  No lymphangitic streaking  Skin:     General: Skin is warm and dry  Findings: Wound present  Comments:      Neurological:      Mental Status: He is alert and oriented to person, place, and time  Motor: Weakness (Paraplegia) present     Psychiatric:         Attention and Perception: Attention normal          Mood and Affect: Mood and affect normal          Behavior: Behavior is cooperative  Thought Content: Thought content normal          Cognition and Memory: Cognition normal              Wound 03/24/22 Pressure Injury Ankle Left;Medial (Active)   Wound Image Images linked 06/06/22 1438   Wound Description Pink;Yellow 06/06/22 1438   Tory-wound Assessment Pink;Purple 06/06/22 1438   Wound Length (cm) 0 4 cm 06/06/22 1438   Wound Width (cm) 0 6 cm 06/06/22 1438   Wound Depth (cm) 0 1 cm 06/06/22 1438   Wound Surface Area (cm^2) 0 24 cm^2 06/06/22 1438   Wound Volume (cm^3) 0 024 cm^3 06/06/22 1438   Calculated Wound Volume (cm^3) 0 02 cm^3 06/06/22 1438   Change in Wound Size % 95 65 06/06/22 1438   Drainage Amount Small 06/06/22 1438   Drainage Description Serous 06/06/22 1438   Non-staged Wound Description Full thickness 06/06/22 1438   Treatments Cleansed 06/06/22 1438   Patient Tolerance Tolerated well 06/06/22 1438       Debridement   Wound 03/24/22 Pressure Injury Ankle Left;Medial    Universal Protocol:  Procedure performed by: (Assisting provider Gui Veras PA-C )  Consent: Verbal consent obtained    Consent given by: patient  Patient understanding: patient states understanding of the procedure being performed  Patient identity confirmed: verbally with patient      Performed by: PA  Debridement type: selective  Pain control: lidocaine 4%  Post-debridement measurements  Length (cm): 0 4  Width (cm): 0 6  Depth (cm): 0 1  Percent debrided: 100%  Surface Area (cm^2): 0 24  Area debrided (cm^2): 0 24  Volume (cm^3): 0 02  Tissue and other material debrided: dermis and subcutaneous tissue  Devitalized tissue debrided: fibrin  Instrument(s) utilized: curette  Bleeding: small  Hemostasis obtained with: pressure  Procedural pain (0-10): 0  Post-procedural pain: 0   Response to treatment: procedure was tolerated well           Results from last 6 Months   Lab Units 03/29/22  1243   WOUND CULTURE  No growth           Wound Instructions:  Orders Placed This Encounter Procedures    Wound cleansing and dressings     Wash your hands with soap and water  Remove old dressing, discard into plastic bag and place in trash  Cleanse the left ankle ulcer with soap (unscented Dove) and water prior to applying a clean dressing  Do not use tissue or cotton balls  Do not scrub the wound  Pat dry using gauze        Shower yes, keep dressing clean and dry   If dressing gets wet change dressing as soon as possible         Apply Betamethasone to chicho ulcer daily  Triamcinolone as needed to periwound for rash at home        Apply silver alginate to the left ankle ulcer  Cover with allevyn bordered foam  Change dressing three times per week and as needed for excessive drainage or leakage  May apply ace wrap from base of toes to below knee at home        Elastic Tubular Stocking       Tubular elastic bandage: Apply from base of toes to behind the knee  Apply in AM, may remove for sleep        Avoid prolonged standing in one place        Elevate leg(s) above the level of the heart when sitting or as much as possible        [facility] [address] [facility]         Standing Status:   Future     Standing Expiration Date:   6/6/2023    Debridement     This order was created via procedure documentation       Cally Middlebush Rust, PA-C      - I, Car Curiel PA-C, have acted as a scribe with the above documentation    -Terrence Davenport MD, CWS have seen and evaluated the above patient and have reviewed and agree with the above documentation  Portions of the record may have been created with voice recognition software  Occasional wrong word or "sound alike" substitutions may have occurred due to the inherent limitations of voice recognition software  Read the chart carefully and recognize, using context, where substitutions have occurred

## 2022-06-06 NOTE — PATIENT INSTRUCTIONS
Orders Placed This Encounter   Procedures    Wound cleansing and dressings     Wash your hands with soap and water  Remove old dressing, discard into plastic bag and place in trash  Cleanse the left ankle ulcer with soap (unscented Dove) and water prior to applying a clean dressing  Do not use tissue or cotton balls  Do not scrub the wound  Pat dry using gauze  Shower yes, keep dressing clean and dry  If dressing gets wet change dressing as soon as possible         Apply Betamethasone to chicho ulcer daily  Triamcinolone as needed to periwound for rash at home  Apply silver alginate to the left ankle ulcer  Cover with allevyn bordered foam  Change dressing three times per week and as needed for excessive drainage or leakage  May apply ace wrap from base of toes to below knee at home  Elastic Tubular Stocking       Tubular elastic bandage: Apply from base of toes to behind the knee  Apply in AM, may remove for sleep  Avoid prolonged standing in one place  Elevate leg(s) above the level of the heart when sitting or as much as possible         Follow up at [address] [facility] in THREE weeks         Standing Status:   Future     Standing Expiration Date:   6/6/2023

## 2022-06-18 ENCOUNTER — HOSPITAL ENCOUNTER (INPATIENT)
Facility: HOSPITAL | Age: 52
LOS: 2 days | Discharge: HOME | DRG: 416 | End: 2022-06-21
Attending: EMERGENCY MEDICINE | Admitting: HOSPITALIST
Payer: OTHER MISCELLANEOUS

## 2022-06-18 ENCOUNTER — APPOINTMENT (EMERGENCY)
Dept: RADIOLOGY | Facility: HOSPITAL | Age: 52
DRG: 416 | End: 2022-06-18
Payer: OTHER MISCELLANEOUS

## 2022-06-18 ENCOUNTER — APPOINTMENT (EMERGENCY)
Dept: CT IMAGING | Facility: HOSPITAL | Age: 52
DRG: 416 | End: 2022-06-18
Payer: OTHER MISCELLANEOUS

## 2022-06-18 DIAGNOSIS — G82.20 PARAPLEGIA (HCC): ICD-10-CM

## 2022-06-18 DIAGNOSIS — A41.9 SEPSIS (HCC): Primary | ICD-10-CM

## 2022-06-18 DIAGNOSIS — L03.116 LEFT LEG CELLULITIS: ICD-10-CM

## 2022-06-18 DIAGNOSIS — M60.9 MYOSITIS: ICD-10-CM

## 2022-06-18 DIAGNOSIS — L89.90 PRESSURE ULCER: ICD-10-CM

## 2022-06-18 LAB
ALBUMIN SERPL BCP-MCNC: 4.4 G/DL (ref 3.5–5)
ALP SERPL-CCNC: 99 U/L (ref 46–116)
ALT SERPL W P-5'-P-CCNC: 43 U/L (ref 12–78)
ANION GAP SERPL CALCULATED.3IONS-SCNC: 11 MMOL/L (ref 4–13)
APTT PPP: 33 SECONDS (ref 23–37)
AST SERPL W P-5'-P-CCNC: 25 U/L (ref 5–45)
BACTERIA UR QL AUTO: ABNORMAL /HPF
BASOPHILS # BLD AUTO: 0.06 THOUSANDS/ΜL (ref 0–0.1)
BASOPHILS NFR BLD AUTO: 0 % (ref 0–1)
BILIRUB DIRECT SERPL-MCNC: 0.11 MG/DL (ref 0–0.2)
BILIRUB SERPL-MCNC: 0.53 MG/DL (ref 0.2–1)
BILIRUB UR QL STRIP: NEGATIVE
BUN SERPL-MCNC: 13 MG/DL (ref 5–25)
CALCIUM SERPL-MCNC: 9.1 MG/DL (ref 8.3–10.1)
CARDIAC TROPONIN I PNL SERPL HS: 3 NG/L (ref 8–18)
CHLORIDE SERPL-SCNC: 98 MMOL/L (ref 100–108)
CK SERPL-CCNC: 145 U/L (ref 39–308)
CLARITY UR: ABNORMAL
CO2 SERPL-SCNC: 29 MMOL/L (ref 21–32)
COLOR UR: YELLOW
CREAT SERPL-MCNC: 1 MG/DL (ref 0.6–1.3)
CRP SERPL QL: 64.6 MG/L
EOSINOPHIL # BLD AUTO: 0.13 THOUSAND/ΜL (ref 0–0.61)
EOSINOPHIL NFR BLD AUTO: 1 % (ref 0–6)
ERYTHROCYTE [DISTWIDTH] IN BLOOD BY AUTOMATED COUNT: 13 % (ref 11.6–15.1)
GFR SERPL CREATININE-BSD FRML MDRD: 87 ML/MIN/1.73SQ M
GLUCOSE SERPL-MCNC: 94 MG/DL (ref 65–140)
GLUCOSE UR STRIP-MCNC: NEGATIVE MG/DL
HCT VFR BLD AUTO: 46.6 % (ref 36.5–49.3)
HGB BLD-MCNC: 15.3 G/DL (ref 12–17)
HGB UR QL STRIP.AUTO: NEGATIVE
IMM GRANULOCYTES # BLD AUTO: 0.06 THOUSAND/UL (ref 0–0.2)
IMM GRANULOCYTES NFR BLD AUTO: 0 % (ref 0–2)
INR PPP: 1.01 (ref 0.84–1.19)
KETONES UR STRIP-MCNC: NEGATIVE MG/DL
LACTATE SERPL-SCNC: 1 MMOL/L (ref 0.5–2)
LEUKOCYTE ESTERASE UR QL STRIP: ABNORMAL
LYMPHOCYTES # BLD AUTO: 1 THOUSANDS/ΜL (ref 0.6–4.47)
LYMPHOCYTES NFR BLD AUTO: 7 % (ref 14–44)
MAGNESIUM SERPL-MCNC: 2 MG/DL (ref 1.6–2.6)
MCH RBC QN AUTO: 27.7 PG (ref 26.8–34.3)
MCHC RBC AUTO-ENTMCNC: 32.8 G/DL (ref 31.4–37.4)
MCV RBC AUTO: 84 FL (ref 82–98)
MONOCYTES # BLD AUTO: 0.62 THOUSAND/ΜL (ref 0.17–1.22)
MONOCYTES NFR BLD AUTO: 4 % (ref 4–12)
MUCOUS THREADS UR QL AUTO: ABNORMAL
NEUTROPHILS # BLD AUTO: 12.87 THOUSANDS/ΜL (ref 1.85–7.62)
NEUTS SEG NFR BLD AUTO: 88 % (ref 43–75)
NITRITE UR QL STRIP: NEGATIVE
NON-SQ EPI CELLS URNS QL MICRO: ABNORMAL /HPF
NRBC BLD AUTO-RTO: 0 /100 WBCS
PH UR STRIP.AUTO: 6.5 [PH]
PLATELET # BLD AUTO: 235 THOUSANDS/UL (ref 149–390)
PMV BLD AUTO: 11 FL (ref 8.9–12.7)
POTASSIUM SERPL-SCNC: 3.4 MMOL/L (ref 3.5–5.3)
PROT SERPL-MCNC: 8.8 G/DL (ref 6.4–8.2)
PROT UR STRIP-MCNC: NEGATIVE MG/DL
PROTHROMBIN TIME: 12.8 SECONDS (ref 11.6–14.5)
RBC # BLD AUTO: 5.53 MILLION/UL (ref 3.88–5.62)
RBC #/AREA URNS AUTO: ABNORMAL /HPF
SODIUM SERPL-SCNC: 138 MMOL/L (ref 136–145)
SP GR UR STRIP.AUTO: 1.02 (ref 1–1.03)
TSH SERPL DL<=0.05 MIU/L-ACNC: 2.82 UIU/ML (ref 0.45–4.5)
UROBILINOGEN UR QL STRIP.AUTO: 0.2 E.U./DL
WBC # BLD AUTO: 14.74 THOUSAND/UL (ref 4.31–10.16)
WBC #/AREA URNS AUTO: ABNORMAL /HPF

## 2022-06-18 PROCEDURE — 87186 SC STD MICRODIL/AGAR DIL: CPT | Performed by: EMERGENCY MEDICINE

## 2022-06-18 PROCEDURE — 71045 X-RAY EXAM CHEST 1 VIEW: CPT

## 2022-06-18 PROCEDURE — 80076 HEPATIC FUNCTION PANEL: CPT | Performed by: EMERGENCY MEDICINE

## 2022-06-18 PROCEDURE — 96368 THER/DIAG CONCURRENT INF: CPT

## 2022-06-18 PROCEDURE — 74177 CT ABD & PELVIS W/CONTRAST: CPT

## 2022-06-18 PROCEDURE — 36415 COLL VENOUS BLD VENIPUNCTURE: CPT | Performed by: EMERGENCY MEDICINE

## 2022-06-18 PROCEDURE — 87070 CULTURE OTHR SPECIMN AEROBIC: CPT | Performed by: EMERGENCY MEDICINE

## 2022-06-18 PROCEDURE — 0241U HB NFCT DS VIR RESP RNA 4 TRGT: CPT | Performed by: EMERGENCY MEDICINE

## 2022-06-18 PROCEDURE — 73701 CT LOWER EXTREMITY W/DYE: CPT

## 2022-06-18 PROCEDURE — 99285 EMERGENCY DEPT VISIT HI MDM: CPT

## 2022-06-18 PROCEDURE — 80048 BASIC METABOLIC PNL TOTAL CA: CPT | Performed by: EMERGENCY MEDICINE

## 2022-06-18 PROCEDURE — 90471 IMMUNIZATION ADMIN: CPT

## 2022-06-18 PROCEDURE — 85025 COMPLETE CBC W/AUTO DIFF WBC: CPT | Performed by: EMERGENCY MEDICINE

## 2022-06-18 PROCEDURE — 84443 ASSAY THYROID STIM HORMONE: CPT | Performed by: EMERGENCY MEDICINE

## 2022-06-18 PROCEDURE — 73600 X-RAY EXAM OF ANKLE: CPT

## 2022-06-18 PROCEDURE — 83605 ASSAY OF LACTIC ACID: CPT | Performed by: EMERGENCY MEDICINE

## 2022-06-18 PROCEDURE — 87205 SMEAR GRAM STAIN: CPT | Performed by: EMERGENCY MEDICINE

## 2022-06-18 PROCEDURE — 82550 ASSAY OF CK (CPK): CPT | Performed by: EMERGENCY MEDICINE

## 2022-06-18 PROCEDURE — 90715 TDAP VACCINE 7 YRS/> IM: CPT | Performed by: EMERGENCY MEDICINE

## 2022-06-18 PROCEDURE — 96366 THER/PROPH/DIAG IV INF ADDON: CPT

## 2022-06-18 PROCEDURE — 99284 EMERGENCY DEPT VISIT MOD MDM: CPT | Performed by: EMERGENCY MEDICINE

## 2022-06-18 PROCEDURE — 87040 BLOOD CULTURE FOR BACTERIA: CPT | Performed by: EMERGENCY MEDICINE

## 2022-06-18 PROCEDURE — 96365 THER/PROPH/DIAG IV INF INIT: CPT

## 2022-06-18 PROCEDURE — 83735 ASSAY OF MAGNESIUM: CPT | Performed by: EMERGENCY MEDICINE

## 2022-06-18 PROCEDURE — 86140 C-REACTIVE PROTEIN: CPT | Performed by: EMERGENCY MEDICINE

## 2022-06-18 PROCEDURE — 85730 THROMBOPLASTIN TIME PARTIAL: CPT | Performed by: EMERGENCY MEDICINE

## 2022-06-18 PROCEDURE — 81001 URINALYSIS AUTO W/SCOPE: CPT | Performed by: EMERGENCY MEDICINE

## 2022-06-18 PROCEDURE — 73590 X-RAY EXAM OF LOWER LEG: CPT

## 2022-06-18 PROCEDURE — 84484 ASSAY OF TROPONIN QUANT: CPT | Performed by: EMERGENCY MEDICINE

## 2022-06-18 PROCEDURE — 85610 PROTHROMBIN TIME: CPT | Performed by: EMERGENCY MEDICINE

## 2022-06-18 PROCEDURE — G1004 CDSM NDSC: HCPCS

## 2022-06-18 RX ORDER — LIDOCAINE HYDROCHLORIDE 20 MG/ML
1 JELLY TOPICAL ONCE
Status: DISCONTINUED | OUTPATIENT
Start: 2022-06-18 | End: 2022-06-19

## 2022-06-18 RX ORDER — LIDOCAINE HYDROCHLORIDE 20 MG/ML
JELLY TOPICAL
Status: DISPENSED
Start: 2022-06-18 | End: 2022-06-19

## 2022-06-18 RX ORDER — ACETAMINOPHEN 325 MG/1
650 TABLET ORAL ONCE
Status: COMPLETED | OUTPATIENT
Start: 2022-06-18 | End: 2022-06-18

## 2022-06-18 RX ORDER — CLINDAMYCIN PHOSPHATE 900 MG/50ML
900 INJECTION INTRAVENOUS ONCE
Status: COMPLETED | OUTPATIENT
Start: 2022-06-18 | End: 2022-06-19

## 2022-06-18 RX ADMIN — ACETAMINOPHEN 650 MG: 325 TABLET ORAL at 23:28

## 2022-06-18 RX ADMIN — PIPERACILLIN AND TAZOBACTAM 4.5 G: 4; .5 INJECTION, POWDER, LYOPHILIZED, FOR SOLUTION INTRAVENOUS at 22:53

## 2022-06-18 RX ADMIN — SODIUM CHLORIDE, SODIUM LACTATE, POTASSIUM CHLORIDE, AND CALCIUM CHLORIDE 2625 ML: .6; .31; .03; .02 INJECTION, SOLUTION INTRAVENOUS at 22:50

## 2022-06-18 RX ADMIN — TETANUS TOXOID, REDUCED DIPHTHERIA TOXOID AND ACELLULAR PERTUSSIS VACCINE, ADSORBED 0.5 ML: 5; 2.5; 8; 8; 2.5 SUSPENSION INTRAMUSCULAR at 22:19

## 2022-06-19 PROBLEM — G82.20 PARAPLEGIA (HCC): Chronic | Status: ACTIVE | Noted: 2020-06-05

## 2022-06-19 PROBLEM — Z78.9 INTERMITTENT SELF-CATHETERIZATION OF BLADDER: Chronic | Status: ACTIVE | Noted: 2022-06-19

## 2022-06-19 PROBLEM — L89.523 PRESSURE INJURY OF LEFT ANKLE, STAGE 3 (HCC): Chronic | Status: ACTIVE | Noted: 2021-11-01

## 2022-06-19 PROBLEM — Z78.9 INTERMITTENT SELF-CATHETERIZATION OF BLADDER: Status: ACTIVE | Noted: 2022-06-19

## 2022-06-19 PROBLEM — E87.6 HYPOKALEMIA: Status: ACTIVE | Noted: 2022-06-19

## 2022-06-19 LAB
ANION GAP SERPL CALCULATED.3IONS-SCNC: 7 MMOL/L (ref 4–13)
BUN SERPL-MCNC: 10 MG/DL (ref 5–25)
CALCIUM SERPL-MCNC: 8.4 MG/DL (ref 8.3–10.1)
CHLORIDE SERPL-SCNC: 102 MMOL/L (ref 100–108)
CO2 SERPL-SCNC: 29 MMOL/L (ref 21–32)
CREAT SERPL-MCNC: 1.05 MG/DL (ref 0.6–1.3)
ERYTHROCYTE [DISTWIDTH] IN BLOOD BY AUTOMATED COUNT: 13.2 % (ref 11.6–15.1)
FLUAV RNA RESP QL NAA+PROBE: NEGATIVE
FLUBV RNA RESP QL NAA+PROBE: NEGATIVE
GFR SERPL CREATININE-BSD FRML MDRD: 82 ML/MIN/1.73SQ M
GLUCOSE SERPL-MCNC: 144 MG/DL (ref 65–140)
HCT VFR BLD AUTO: 39.6 % (ref 36.5–49.3)
HGB BLD-MCNC: 12.9 G/DL (ref 12–17)
MCH RBC QN AUTO: 28 PG (ref 26.8–34.3)
MCHC RBC AUTO-ENTMCNC: 32.6 G/DL (ref 31.4–37.4)
MCV RBC AUTO: 86 FL (ref 82–98)
PLATELET # BLD AUTO: 196 THOUSANDS/UL (ref 149–390)
PMV BLD AUTO: 10.5 FL (ref 8.9–12.7)
POTASSIUM SERPL-SCNC: 3.6 MMOL/L (ref 3.5–5.3)
PROCALCITONIN SERPL-MCNC: <0.05 NG/ML
RBC # BLD AUTO: 4.61 MILLION/UL (ref 3.88–5.62)
RSV RNA RESP QL NAA+PROBE: NEGATIVE
SARS-COV-2 RNA RESP QL NAA+PROBE: NEGATIVE
SODIUM SERPL-SCNC: 138 MMOL/L (ref 136–145)
WBC # BLD AUTO: 12.82 THOUSAND/UL (ref 4.31–10.16)

## 2022-06-19 PROCEDURE — 87081 CULTURE SCREEN ONLY: CPT | Performed by: HOSPITALIST

## 2022-06-19 PROCEDURE — 80048 BASIC METABOLIC PNL TOTAL CA: CPT | Performed by: HOSPITALIST

## 2022-06-19 PROCEDURE — 87086 URINE CULTURE/COLONY COUNT: CPT | Performed by: FAMILY MEDICINE

## 2022-06-19 PROCEDURE — 99223 1ST HOSP IP/OBS HIGH 75: CPT | Performed by: HOSPITALIST

## 2022-06-19 PROCEDURE — 84145 PROCALCITONIN (PCT): CPT | Performed by: HOSPITALIST

## 2022-06-19 PROCEDURE — 96367 TX/PROPH/DG ADDL SEQ IV INF: CPT

## 2022-06-19 PROCEDURE — 85027 COMPLETE CBC AUTOMATED: CPT | Performed by: HOSPITALIST

## 2022-06-19 RX ORDER — ENOXAPARIN SODIUM 100 MG/ML
40 INJECTION SUBCUTANEOUS DAILY
Status: DISCONTINUED | OUTPATIENT
Start: 2022-06-19 | End: 2022-06-21 | Stop reason: HOSPADM

## 2022-06-19 RX ORDER — SIMETHICONE 80 MG
80 TABLET,CHEWABLE ORAL 4 TIMES DAILY PRN
Status: DISCONTINUED | OUTPATIENT
Start: 2022-06-19 | End: 2022-06-21 | Stop reason: HOSPADM

## 2022-06-19 RX ORDER — IBUPROFEN 600 MG/1
600 TABLET ORAL ONCE
Status: DISCONTINUED | OUTPATIENT
Start: 2022-06-19 | End: 2022-06-19

## 2022-06-19 RX ORDER — DIAZEPAM 5 MG/1
10 TABLET ORAL
Status: DISCONTINUED | OUTPATIENT
Start: 2022-06-19 | End: 2022-06-21 | Stop reason: HOSPADM

## 2022-06-19 RX ORDER — GABAPENTIN 400 MG/1
800 CAPSULE ORAL 2 TIMES DAILY
Status: DISCONTINUED | OUTPATIENT
Start: 2022-06-19 | End: 2022-06-21 | Stop reason: HOSPADM

## 2022-06-19 RX ORDER — POTASSIUM CHLORIDE 20 MEQ/1
40 TABLET, EXTENDED RELEASE ORAL ONCE
Status: COMPLETED | OUTPATIENT
Start: 2022-06-19 | End: 2022-06-19

## 2022-06-19 RX ORDER — IPRATROPIUM BROMIDE AND ALBUTEROL SULFATE 2.5; .5 MG/3ML; MG/3ML
3 SOLUTION RESPIRATORY (INHALATION)
Status: DISCONTINUED | OUTPATIENT
Start: 2022-06-19 | End: 2022-06-19

## 2022-06-19 RX ORDER — GABAPENTIN 400 MG/1
800 CAPSULE ORAL DAILY
Status: DISCONTINUED | OUTPATIENT
Start: 2022-06-20 | End: 2022-06-19

## 2022-06-19 RX ORDER — TRIAMCINOLONE ACETONIDE 1 MG/G
CREAM TOPICAL 2 TIMES DAILY
Status: DISCONTINUED | OUTPATIENT
Start: 2022-06-19 | End: 2022-06-21 | Stop reason: HOSPADM

## 2022-06-19 RX ORDER — SODIUM CHLORIDE 9 MG/ML
75 INJECTION, SOLUTION INTRAVENOUS CONTINUOUS
Status: DISPENSED | OUTPATIENT
Start: 2022-06-19 | End: 2022-06-19

## 2022-06-19 RX ORDER — ONDANSETRON 2 MG/ML
4 INJECTION INTRAMUSCULAR; INTRAVENOUS EVERY 6 HOURS PRN
Status: DISCONTINUED | OUTPATIENT
Start: 2022-06-19 | End: 2022-06-21 | Stop reason: HOSPADM

## 2022-06-19 RX ORDER — GABAPENTIN 400 MG/1
400 CAPSULE ORAL 3 TIMES DAILY
Status: DISCONTINUED | OUTPATIENT
Start: 2022-06-19 | End: 2022-06-19

## 2022-06-19 RX ORDER — OXYBUTYNIN CHLORIDE 5 MG/1
10 TABLET, EXTENDED RELEASE ORAL
Status: DISCONTINUED | OUTPATIENT
Start: 2022-06-19 | End: 2022-06-21 | Stop reason: HOSPADM

## 2022-06-19 RX ORDER — TRIAMCINOLONE ACETONIDE 5 MG/G
CREAM TOPICAL 2 TIMES DAILY
Status: DISCONTINUED | OUTPATIENT
Start: 2022-06-19 | End: 2022-06-19

## 2022-06-19 RX ORDER — OFLOXACIN 3 MG/ML
2 SOLUTION/ DROPS OPHTHALMIC ONCE
Status: DISCONTINUED | OUTPATIENT
Start: 2022-06-19 | End: 2022-06-19

## 2022-06-19 RX ORDER — MELATONIN
2000 DAILY
Status: DISCONTINUED | OUTPATIENT
Start: 2022-06-19 | End: 2022-06-21 | Stop reason: HOSPADM

## 2022-06-19 RX ORDER — GABAPENTIN 400 MG/1
400 CAPSULE ORAL
Status: DISCONTINUED | OUTPATIENT
Start: 2022-06-19 | End: 2022-06-21 | Stop reason: HOSPADM

## 2022-06-19 RX ORDER — ACETAMINOPHEN 325 MG/1
650 TABLET ORAL EVERY 6 HOURS PRN
Status: DISCONTINUED | OUTPATIENT
Start: 2022-06-19 | End: 2022-06-21 | Stop reason: HOSPADM

## 2022-06-19 RX ORDER — TIZANIDINE 4 MG/1
4 TABLET ORAL
Status: DISCONTINUED | OUTPATIENT
Start: 2022-06-19 | End: 2022-06-21 | Stop reason: HOSPADM

## 2022-06-19 RX ORDER — DOCUSATE SODIUM 100 MG/1
100 CAPSULE, LIQUID FILLED ORAL 2 TIMES DAILY
Status: DISCONTINUED | OUTPATIENT
Start: 2022-06-19 | End: 2022-06-21 | Stop reason: HOSPADM

## 2022-06-19 RX ADMIN — PIPERACILLIN AND TAZOBACTAM 3.38 G: 3; .375 INJECTION, POWDER, LYOPHILIZED, FOR SOLUTION INTRAVENOUS at 10:19

## 2022-06-19 RX ADMIN — GABAPENTIN 800 MG: 300 CAPSULE ORAL at 00:45

## 2022-06-19 RX ADMIN — PIPERACILLIN AND TAZOBACTAM 3.38 G: 3; .375 INJECTION, POWDER, LYOPHILIZED, FOR SOLUTION INTRAVENOUS at 21:45

## 2022-06-19 RX ADMIN — VANCOMYCIN HYDROCHLORIDE 1250 MG: 5 INJECTION, POWDER, LYOPHILIZED, FOR SOLUTION INTRAVENOUS at 01:03

## 2022-06-19 RX ADMIN — DIAZEPAM 10 MG: 5 TABLET ORAL at 22:53

## 2022-06-19 RX ADMIN — OXYBUTYNIN CHLORIDE 10 MG: 5 TABLET, EXTENDED RELEASE ORAL at 22:53

## 2022-06-19 RX ADMIN — PIPERACILLIN AND TAZOBACTAM 3.38 G: 3; .375 INJECTION, POWDER, LYOPHILIZED, FOR SOLUTION INTRAVENOUS at 16:29

## 2022-06-19 RX ADMIN — GABAPENTIN 400 MG: 400 CAPSULE ORAL at 16:29

## 2022-06-19 RX ADMIN — Medication 3.38 G: at 04:24

## 2022-06-19 RX ADMIN — IOHEXOL 70 ML: 350 INJECTION, SOLUTION INTRAVENOUS at 00:29

## 2022-06-19 RX ADMIN — Medication 2000 UNITS: at 08:54

## 2022-06-19 RX ADMIN — TIZANIDINE 4 MG: 4 TABLET ORAL at 22:53

## 2022-06-19 RX ADMIN — GABAPENTIN 400 MG: 400 CAPSULE ORAL at 08:54

## 2022-06-19 RX ADMIN — TRIAMCINOLONE ACETONIDE: 1 CREAM TOPICAL at 10:20

## 2022-06-19 RX ADMIN — CLINDAMYCIN PHOSPHATE 900 MG: 900 INJECTION, SOLUTION INTRAVENOUS at 00:27

## 2022-06-19 RX ADMIN — GABAPENTIN 400 MG: 400 CAPSULE ORAL at 22:53

## 2022-06-19 RX ADMIN — POTASSIUM CHLORIDE 40 MEQ: 20 TABLET, EXTENDED RELEASE ORAL at 04:16

## 2022-06-19 RX ADMIN — GABAPENTIN 800 MG: 400 CAPSULE ORAL at 18:48

## 2022-06-19 RX ADMIN — TRIAMCINOLONE ACETONIDE: 1 CREAM TOPICAL at 17:38

## 2022-06-19 RX ADMIN — SODIUM CHLORIDE 75 ML/HR: 0.9 INJECTION, SOLUTION INTRAVENOUS at 04:19

## 2022-06-19 NOTE — H&P
[address] [facility] [address]  H&P- Analisa Wisdomasant 1970, 49 y o  male MRN: [id number]  Unit/Bed#: RM02 Encounter: [id number]  Primary Care Provider: Leonela Yanes PA-C   Date and time admitted to hospital: 6/18/2022  9:01 PM    * Sepsis without septic shock [facility])  Assessment & Plan  Patient reported temperature 101° F at home ,temperature maximum in the emergency room 38 2C,  heart rate 119< WBC 14 7 , leukocytosis trending down since December ,lactic acid 1  CT of left lower extremity did not reveal yesika gas collection but patient reports his chronic left malleolus wound getting worse and he noted erythema to the left lower extremity so will treat sepsis secondary to most likely cellulitis  Wound care consult  Continue broad-spectrum antibiotic  Continue IV fluids antiemetic antipyretic  Monitor WBC and vital signs    Hypokalemia  Assessment & Plan  Will replace with p o  Potassium  Continue to monitor electrolytes and replace on as needed basis    Pressure injury of left ankle, stage 3 ([address] )  Assessment & Plan  Patient follow-up with wound clinic as outpatient  Management as above    Intermittent self-catheterization of bladder  Assessment & Plan  Urinalysis showed mild pyuria  Continue current antibiotic    Paraplegia ([address] )  Assessment & Plan  Secondary to T12 fracture   self catheterization due to neurogenic blood  Supportive care        VTE Pharmacologic Prophylaxis: VTE Score: 4 Moderate Risk (Score 3-4) - Pharmacological DVT Prophylaxis Ordered: enoxaparin (Lovenox)  Code Status: Level 1 - Full Code       Anticipated Length of Stay: Patient will be admitted on an inpatient basis with an anticipated length of stay of greater than 2 midnights secondary to Sepsis treatment IV antibiotic wound care consult  Total Time for Visit, including Counseling / Coordination of Care: 45 minutes Greater than 50% of this total time spent on direct patient counseling and coordination of care      Chief Complaint:  Redness of left lower extremity getting worse wound    History of Present Illness:  Matt Juan is a 49 y o  male with a PMH of paraplegia secondary to T12 fracture neurogenic bladder also self catheterization atrial fibrillation episode COVID pneumonia who presented to the emergency room for evaluation of getting worse wound and erythema of left lower extremity , patient also endorses fever 101 Fat home  He denies nausea vomiting chest pain abdominal pain  hematemesis melena, lightheadedness cough sputum production diarrhea  Reports significant improvement of leg erythema after IV antibiotic initiated  Temperature maximum in the emergency room 38 2 C heart rate 119 labs showed WBC 14 7 chloride 98 potassium 3 4 lactic acid 1 urinalysis showed mild pyuria CT of abdomen pelvis official report pending preliminary note acute finding CT of left lower extremity showed no gas collection official report still pending  Patient was given broad-spectrum antibiotic  He will be admitted to the hospitalist service on an inpatient basis          Review of Systems:  Review of Systems   Constitutional: Positive for chills and fever  Skin: Positive for color change and wound  Past Medical and Surgical History:   Past Medical History:   Diagnosis Date    Back injuries     Neurogenic bladder     Parapleg[address] [address] [address] )     Renal disorder        Past Surgical History:   Procedure Laterality Date    BACK SURGERY      NEPHRECTOMY         Meds/Allergies:  Prior to Admission medications    Medication Sig Start Date End Date Taking?  Authorizing Provider   cholecalciferol (VITAMIN D3) 1,000 units tablet Take 2 tablets (2,000 Units total) by mouth daily 10/15/21  Yes Nolan Olmedo PA-C   diazepam (VALIUM) 5 mg tablet Take 10 mg by mouth daily at bedtime as needed for anxiety    Yes Historical Provider, MD   gabapentin (NEURONTIN) 400 mg capsule Take 400 mg by mouth 3 (three) times a day   Yes Historical Provider, MD oxybutynin (DITROPAN-XL) 10 MG 24 hr tablet Take 10 mg by mouth 2 (two) times a day   Yes Historical Provider, MD   TiZANidine (ZANAFLEX) 4 MG capsule Take 4 mg by mouth daily at bedtime   Yes Historical Provider, MD   albuterol (Ventolin HFA) 90 mcg/act inhaler Inhale 2 puffs every 6 (six) hours as needed for wheezing  Patient not taking: Reported on 3/24/2022  10/5/21   Soraya Shafer PA-C   apixaban (ELIQUIS) 2 5 mg Take 1 tablet (2 5 mg total) by mouth 2 (two) times a day  Patient not taking: Reported on 3/24/2022  10/14/21   Roxana Ardon PA-C   ascorbic acid (VITAMIN C) 1000 MG tablet Take 1 tablet (1,000 mg total) by mouth 2 (two) times a day  Patient not taking: Reported on 3/24/2022  10/14/21   Roxana Ardon PA-C   dexamethasone (DECADRON) 6 mg tablet Take 1 tablet (6 mg total) by mouth daily with breakfast  Patient not taking: Reported on 3/24/2022  10/14/21   Roxana Ardon PA-C   guaiFENesin (MUCINEX) 600 mg 12 hr tablet Take 1 tablet (600 mg total) by mouth every 12 (twelve) hours  Patient not taking: Reported on 3/24/2022  10/14/21   Roxana Ardon PA-C   ibuprofen (MOTRIN) 400 mg tablet Take 1 tablet (400 mg total) by mouth every 6 (six) hours as needed for mild pain  Patient not taking: Reported on 3/24/2022  10/5/21   Soraya Shafer PA-C   triamcinolone (KENALOG) 0 5 % cream Apply topically 2 (two) times a day 4/18/22   Latanya Pepe MD   zinc sulfate (ZINCATE) 220 mg capsule Take 1 capsule (220 mg total) by mouth 2 (two) times a day  Patient not taking: Reported on 3/24/2022  10/14/21   Roxana Ardon PA-C     I have reviewed home medications with a medical source (PCP, Pharmacy, other)  Allergies:    Allergies   Allergen Reactions    Codeine Hives    Dilaudid [Hydromorphone] Other (See Comments)     Patient is unable to sleep     Morphine And Related Hives       Social History:  Marital Status: Single   Occupation: none  Patient Pre-hospital Living Situation: Home  Patient Pre-hospital Level of Mobility: power wheelchair  Patient Pre-hospital Diet Restrictions: reg  Substance Use History:   Social History     Substance and Sexual Activity   Alcohol Use Never     Social History     Tobacco Use   Smoking Status Never Smoker   Smokeless Tobacco Never Used     Social History     Substance and Sexual Activity   Drug Use No       Family History:  Family History   Problem Relation Age of Onset    Cancer Brother        Physical Exam:     Vitals:   Blood Pressure: 164/85 (06/19/22 0245)  Pulse: 85 (06/19/22 0245)  Temperature: (!) 100 8 °F (38 2 °C) (06/18/22 2103)  Temp Source: Tympanic (06/18/22 2103)  Respirations: 20 (06/19/22 0245)  Weight - Scale: 87 5 kg (192 lb 14 4 oz) (06/18/22 2103)  SpO2: 92 % (06/19/22 0245)    Physical Exam  Constitutional:       Appearance: He is not ill-appearing  HENT:      Head: Normocephalic  Cardiovascular:      Rate and Rhythm: Normal rate  Pulmonary:      Effort: Pulmonary effort is normal    Abdominal:      General: Abdomen is flat  Musculoskeletal:         General: Swelling and tenderness present  Skin:     Findings: Erythema present  Neurological:      Mental Status: He is oriented to person, place, and time        Comments: Paraplegia   Psychiatric:         Mood and Affect: Mood normal           Additional Data:     Lab Results:  Results from last 7 days   Lab Units 06/18/22 2208   WBC Thousand/uL 14 74*   HEMOGLOBIN g/dL 15 3   HEMATOCRIT % 46 6   PLATELETS Thousands/uL 235   NEUTROS PCT % 88*   LYMPHS PCT % 7*   MONOS PCT % 4   EOS PCT % 1     Results from last 7 days   Lab Units 06/18/22 2208   SODIUM mmol/L 138   POTASSIUM mmol/L 3 4*   CHLORIDE mmol/L 98*   CO2 mmol/L 29   BUN mg/dL 13   CREATININE mg/dL 1 00   ANION GAP mmol/L 11   CALCIUM mg/dL 9 1   ALBUMIN g/dL 4 4   TOTAL BILIRUBIN mg/dL 0 53   ALK PHOS U/L 99   ALT U/L 43   AST U/L 25   GLUCOSE RANDOM mg/dL 94     Results from last 7 days   Lab Units 06/18/22 2208   INR  1 01 Results from last 7 days   Lab Units 06/18/22  2208   LACTIC ACID mmol/L 1 0       Imaging: Reviewed radiology reports from this admission including: abdominal/pelvic CT  CT lower extremity w contrast left   ED Interpretation by Beto Villela MD (06/19 0251)   [facility]:  No acute osseous abnormalities small shallow subcutaneous ulcer medial aspect the left ankle with diffuse subcutaneous cellulitis incidentally noted diffuse edema of the anterior compartment musculature of the right calf with coarse calcifications which is nonspecific there is general subcu edema of the right      CT abdomen pelvis w contrast   ED Interpretation by Beto Villela MD (06/19 6763)   [facility] [address]  [place]:  Diffuse ground-glass opacities at the periphery of visualized right lung and left bases compatible with infectious or inflammatory infiltrates particularly COVID-19 pneumonia no acute findings in the abdomen or pelvis right kidney is absent      XR ankle 2 views LEFT   ED Interpretation by Beto Villela MD (06/18 2302)   Read by me; Radiologist to provide formal interpretation  Medial soft tissue defect to ankle c/w open wound ? Gas proximally and inferior to medial malleolus        XR tibia fibula 2 views LEFT   ED Interpretation by Beto Villela MD (06/18 2212)   Read by me; Radiologist to provide formal interpretation  No acute fracture or air in soft tissues  XR chest 1 view portable   ED Interpretation by Beto Villela MD (06/18 2302)   Read by me; Radiologist to provide formal interpretation  No acute process            ** Please Note: This note has been constructed using a voice recognition system   **

## 2022-06-19 NOTE — ED NOTES
Pt straight cath self for 500cc clear yellow urine        Gabriella Garrison, ZACHARY  06/18/22 4080

## 2022-06-19 NOTE — QUICK NOTE
Patient seen and examined at bedside, feeling better, less neuropathic pain     /69 (BP Location: Left arm)   Pulse 90   Temp (!) 97 3 °F (36 3 °C) (Oral)   Resp 19   Ht 5' 11" (1 803 m)   Wt 87 5 kg (192 lb 14 4 oz)   SpO2 97%   BMI 26 90 kg/m²     Results from last 7 days   Lab Units 06/19/22  0430 06/18/22  2208   WBC Thousand/uL 12 82* 14 74*   HEMOGLOBIN g/dL 12 9 15 3   HEMATOCRIT % 39 6 46 6   PLATELETS Thousands/uL 196 235       Results from last 7 days   Lab Units 06/19/22  0430 06/18/22  2208   SODIUM mmol/L 138 138   POTASSIUM mmol/L 3 6 3 4*   CHLORIDE mmol/L 102 98*   CO2 mmol/L 29 29   BUN mg/dL 10 13   CREATININE mg/dL 1 05 1 00   CALCIUM mg/dL 8 4 9 1   ALK PHOS U/L  --  99   ALT U/L  --  43   AST U/L  --  25       Sepsis POA secondary to LLE Pressure Ulcer    Continue zosyn, wound fluid sent for culture (prior cultures are without growth, wound care and podiatry consult     Discussed with Podiatry, will obtain MRI with contrast     Pyuria: given he self catheterizes will send urine for culture

## 2022-06-19 NOTE — PLAN OF CARE
Problem: MOBILITY - ADULT  Goal: Maintain or return to baseline ADL function  Description: INTERVENTIONS:  -  Assess patient's ability to carry out ADLs; assess patient's baseline for ADL function and identify physical deficits which impact ability to perform ADLs (bathing, care of mouth/teeth, toileting, grooming, dressing, etc )  - Assess/evaluate cause of self-care deficits   - Assess range of motion  - Assess patient's mobility; develop plan if impaired  - Assess patient's need for assistive devices and provide as appropriate  - Encourage maximum independence but intervene and supervise when necessary  - Involve family in performance of ADLs  - Assess for home care needs following discharge   - Consider OT consult to assist with ADL evaluation and planning for discharge  - Provide patient education as appropriate  Outcome: Progressing  Goal: Maintains/Returns to pre admission functional level  Description: INTERVENTIONS:  - Perform BMAT or MOVE assessment daily    - Set and communicate daily mobility goal to care team and patient/family/caregiver  - Collaborate with rehabilitation services on mobility goals if consulted  - Perform Range of Motion three times a day  - Reposition patient every two hours    - Dangle patient three times a day  - Out of bed to chair three times a day   - Out of bed for meals three times a day  - Out of bed for toileting  - Record patient progress and toleration of activity level   Outcome: Progressing     Problem: Potential for Falls  Goal: Patient will remain free of falls  Description: INTERVENTIONS:  - Educate patient/family on patient safety including physical limitations  - Instruct patient to call for assistance with activity   - Consult OT/PT to assist with strengthening/mobility   - Keep Call bell within reach  - Keep bed low and locked with side rails adjusted as appropriate  - Keep care items and personal belongings within reach  - Initiate and maintain comfort rounds  - Make Fall Risk Sign visible to staff  - Offer Toileting every two Hours, in advance of need  - Initiate/Maintain bed alarm  - Obtain necessary fall risk management equipment: non slip socks  - Apply yellow socks and bracelet for high fall risk patients  - Consider moving patient to room near nurses station  Outcome: Progressing     Problem: PAIN - ADULT  Goal: Verbalizes/displays adequate comfort level or baseline comfort level  Description: Interventions:  - Encourage patient to monitor pain and request assistance  - Assess pain using appropriate pain scale  - Administer analgesics based on type and severity of pain and evaluate response  - Implement non-pharmacological measures as appropriate and evaluate response  - Consider cultural and social influences on pain and pain management  - Notify physician/advanced practitioner if interventions unsuccessful or patient reports new pain  Outcome: Progressing     Problem: INFECTION - ADULT  Goal: Absence or prevention of progression during hospitalization  Description: INTERVENTIONS:  - Assess and monitor for signs and symptoms of infection  - Monitor lab/diagnostic results  - Monitor all insertion sites, i e  indwelling lines, tubes, and drains  - [place] appropriate cooling/warming therapies per order  - Administer medications as ordered  - Instruct and encourage patient and family to use good hand hygiene technique  - Identify and instruct in appropriate isolation precautions for identified infection/condition  Outcome: Progressing     Problem: SAFETY ADULT  Goal: Maintain or return to baseline ADL function  Description: INTERVENTIONS:  -  Assess patient's ability to carry out ADLs; assess patient's baseline for ADL function and identify physical deficits which impact ability to perform ADLs (bathing, care of mouth/teeth, toileting, grooming, dressing, etc )  - Assess/evaluate cause of self-care deficits   - Assess range of motion  - Assess patient's mobility; develop plan if impaired  - Assess patient's need for assistive devices and provide as appropriate  - Encourage maximum independence but intervene and supervise when necessary  - Involve family in performance of ADLs  - Assess for home care needs following discharge   - Consider OT consult to assist with ADL evaluation and planning for discharge  - Provide patient education as appropriate  Outcome: Progressing  Goal: Maintains/Returns to pre admission functional level  Description: INTERVENTIONS:  - Perform BMAT or MOVE assessment daily    - Set and communicate daily mobility goal to care team and patient/family/caregiver  - Collaborate with rehabilitation services on mobility goals if consulted  - Perform Range of Motion three times a day  - Reposition patient every two hours    - Dangle patient three times a day  - Out of bed to chair three times a day   - Out of bed for meals three times a day  - Out of bed for toileting  - Record patient progress and toleration of activity level   Outcome: Progressing  Goal: Patient will remain free of falls  Description: INTERVENTIONS:  - Educate patient/family on patient safety including physical limitations  - Instruct patient to call for assistance with activity   - Consult OT/PT to assist with strengthening/mobility   - Keep Call bell within reach  - Keep bed low and locked with side rails adjusted as appropriate  - Keep care items and personal belongings within reach  - Initiate and maintain comfort rounds  - Make Fall Risk Sign visible to staff  - Offer Toileting every two Hours, in advance of need  - Initiate/Maintain bed alarm  - Obtain necessary fall risk management equipment: non slip socks  - Apply yellow socks and bracelet for high fall risk patients  - Consider moving patient to room near nurses station  Outcome: Progressing     Problem: DISCHARGE PLANNING  Goal: Discharge to home or other facility with appropriate resources  Description: INTERVENTIONS:  - Identify barriers to discharge w/patient and caregiver  - Arrange for needed discharge resources and transportation as appropriate  - Identify discharge learning needs (meds, wound care, etc )  - Arrange for interpretive services to assist at discharge as needed  - Refer to Case Management Department for coordinating discharge planning if the patient needs post-hospital services based on physician/advanced practitioner order or complex needs related to functional status, cognitive ability, or social support system  Outcome: Progressing     Problem: Knowledge Deficit  Goal: Patient/family/caregiver demonstrates understanding of disease process, treatment plan, medications, and discharge instructions  Description: Complete learning assessment and assess knowledge base    Interventions:  - Provide teaching at level of understanding  - Provide teaching via preferred learning methods  Outcome: Progressing     Problem: SKIN/TISSUE INTEGRITY - ADULT  Goal: Skin Integrity remains intact(Skin Breakdown Prevention)  Description: Assess:  -Perform Biju assessment every shift, or 8 hours  -Clean and moisturize skin every episode of incontinence or as needed  -Inspect skin when repositioning, toileting, and assisting with ADLS  -Assess under medical devices such as masimo every two hours  -Assess extremities for adequate circulation and sensation     Bed Management:  -Have minimal linens on bed & keep smooth, unwrinkled  -Change linens as needed when moist or perspiring  -Avoid sitting or lying in one position for more than two hours while in bed  -Keep HOB at 30degrees     Toileting:  -Offer bedside commode  -Assess for incontinence every two hours   -Use incontinent care products after each incontinent episode such as barrier cream    Activity:  -Mobilize patient three times a day  -Encourage activity and walks on unit  -Encourage or provide ROM exercises   -Turn and reposition patient every two Hours  -Use appropriate equipment to lift or move patient in bed  -Instruct/ Assist with weight shifting every two when out of bed in chair  -Consider limitation of chair time two hour intervals    Skin Care:  -Avoid use of baby powder, tape, friction and shearing, hot water or constrictive clothing  -Relieve pressure over bony prominences using allevyns, pillows to elevate off bed, foam wedges  -Do not massage red bony areas    Next Steps:  -Teach patient strategies to minimize risks such as call for the nurse before trying to get out of bed   -Consider consults to  interdisciplinary teams such as pt/ot   Outcome: Progressing  Goal: Incision(s), wounds(s) or drain site(s) healing without S/S of infection  Description: INTERVENTIONS  - Assess and document dressing, incision, wound bed, drain sites and surrounding tissue  - Provide patient and family education  - Perform skin care/dressing changes every shift/8hrs or as needed due to soilage     Outcome: Progressing  Goal: Pressure injury heals and does not worsen  Description: Interventions:  - Implement low air loss mattress or specialty surface (Criteria met)  - Apply silicone foam dressing  - Instruct/assist with weight shifting every 15 minutes when in chair   - Limit chair time to two hour intervals  - Use special pressure reducing interventions such as waffle cushion when in chair   - Apply fecal or urinary incontinence containment device   - Perform passive or active ROM every 8hrs  - Turn and reposition patient & offload bony prominences every two hours   - Utilize friction reducing device or surface for transfers   - Consider consults to  interdisciplinary teams such as pt/ot  - Use incontinent care products after each incontinent episode such as incontinent cleanser, barrier cream   - Consider nutrition services referral as needed  Outcome: Progressing

## 2022-06-19 NOTE — ASSESSMENT & PLAN NOTE
Patient reported temperature 101° F at home ,temperature maximum in the emergency room 38 2C,  heart rate 119< WBC 14 7 , leukocytosis trending down since December ,lactic acid 1  CT of left lower extremity did not reveal yesika gas collection but patient reports his chronic left malleolus wound getting worse and he noted erythema to the left lower extremity so will treat sepsis secondary to most likely cellulitis  Wound care consult  Continue broad-spectrum antibiotic  Continue IV fluids antiemetic antipyretic  Monitor WBC and vital signs

## 2022-06-19 NOTE — PLAN OF CARE
Problem: MOBILITY - ADULT  Goal: Maintain or return to baseline ADL function  Description: INTERVENTIONS:  -  Assess patient's ability to carry out ADLs; assess patient's baseline for ADL function and identify physical deficits which impact ability to perform ADLs (bathing, care of mouth/teeth, toileting, grooming, dressing, etc )  - Assess/evaluate cause of self-care deficits   - Assess range of motion  - Assess patient's mobility; develop plan if impaired  - Assess patient's need for assistive devices and provide as appropriate  - Encourage maximum independence but intervene and supervise when necessary  - Involve family in performance of ADLs  - Assess for home care needs following discharge   - Consider OT consult to assist with ADL evaluation and planning for discharge  - Provide patient education as appropriate  Outcome: Progressing  Goal: Maintains/Returns to pre admission functional level  Description: INTERVENTIONS:  - Perform BMAT or MOVE assessment daily    - Set and communicate daily mobility goal to care team and patient/family/caregiver  - Collaborate with rehabilitation services on mobility goals if consulted  - Perform Range of Motion three times a day  - Reposition patient every two hours    - Dangle patient three times a day  - Out of bed to chair three times a day   - Out of bed for meals three times a day  - Out of bed for toileting  - Record patient progress and toleration of activity level   Outcome: Progressing     Problem: Potential for Falls  Goal: Patient will remain free of falls  Description: INTERVENTIONS:  - Educate patient/family on patient safety including physical limitations  - Instruct patient to call for assistance with activity   - Consult OT/PT to assist with strengthening/mobility   - Keep Call bell within reach  - Keep bed low and locked with side rails adjusted as appropriate  - Keep care items and personal belongings within reach  - Initiate and maintain comfort rounds  - Make Fall Risk Sign visible to staff  - Offer Toileting every two Hours, in advance of need  - Initiate/Maintain bed alarm  - Obtain necessary fall risk management equipment: non slip socks  - Apply yellow socks and bracelet for high fall risk patients  - Consider moving patient to room near nurses station  Outcome: Progressing     Problem: PAIN - ADULT  Goal: Verbalizes/displays adequate comfort level or baseline comfort level  Description: Interventions:  - Encourage patient to monitor pain and request assistance  - Assess pain using appropriate pain scale  - Administer analgesics based on type and severity of pain and evaluate response  - Implement non-pharmacological measures as appropriate and evaluate response  - Consider cultural and social influences on pain and pain management  - Notify physician/advanced practitioner if interventions unsuccessful or patient reports new pain  Outcome: Progressing     Problem: INFECTION - ADULT  Goal: Absence or prevention of progression during hospitalization  Description: INTERVENTIONS:  - Assess and monitor for signs and symptoms of infection  - Monitor lab/diagnostic results  - Monitor all insertion sites, i e  indwelling lines, tubes, and drains  - Ralph appropriate cooling/warming therapies per order  - Administer medications as ordered  - Instruct and encourage patient and family to use good hand hygiene technique  - Identify and instruct in appropriate isolation precautions for identified infection/condition  Outcome: Progressing     Problem: SAFETY ADULT  Goal: Maintain or return to baseline ADL function  Description: INTERVENTIONS:  -  Assess patient's ability to carry out ADLs; assess patient's baseline for ADL function and identify physical deficits which impact ability to perform ADLs (bathing, care of mouth/teeth, toileting, grooming, dressing, etc )  - Assess/evaluate cause of self-care deficits   - Assess range of motion  - Assess patient's mobility; develop plan if impaired  - Assess patient's need for assistive devices and provide as appropriate  - Encourage maximum independence but intervene and supervise when necessary  - Involve family in performance of ADLs  - Assess for home care needs following discharge   - Consider OT consult to assist with ADL evaluation and planning for discharge  - Provide patient education as appropriate  Outcome: Progressing  Goal: Maintains/Returns to pre admission functional level  Description: INTERVENTIONS:  - Perform BMAT or MOVE assessment daily    - Set and communicate daily mobility goal to care team and patient/family/caregiver  - Collaborate with rehabilitation services on mobility goals if consulted  - Perform Range of Motion three times a day  - Reposition patient every two hours    - Dangle patient three times a day  - Out of bed to chair three times a day   - Out of bed for meals three times a day  - Out of bed for toileting  - Record patient progress and toleration of activity level   Outcome: Progressing  Goal: Patient will remain free of falls  Description: INTERVENTIONS:  - Educate patient/family on patient safety including physical limitations  - Instruct patient to call for assistance with activity   - Consult OT/PT to assist with strengthening/mobility   - Keep Call bell within reach  - Keep bed low and locked with side rails adjusted as appropriate  - Keep care items and personal belongings within reach  - Initiate and maintain comfort rounds  - Make Fall Risk Sign visible to staff  - Offer Toileting every two Hours, in advance of need  - Initiate/Maintain bed alarm  - Obtain necessary fall risk management equipment: non slip socks  - Apply yellow socks and bracelet for high fall risk patients  - Consider moving patient to room near nurses station  Outcome: Progressing     Problem: DISCHARGE PLANNING  Goal: Discharge to home or other facility with appropriate resources  Description: INTERVENTIONS:  - Identify barriers to discharge w/patient and caregiver  - Arrange for needed discharge resources and transportation as appropriate  - Identify discharge learning needs (meds, wound care, etc )  - Arrange for interpretive services to assist at discharge as needed  - Refer to Case Management Department for coordinating discharge planning if the patient needs post-hospital services based on physician/advanced practitioner order or complex needs related to functional status, cognitive ability, or social support system  Outcome: Progressing     Problem: Knowledge Deficit  Goal: Patient/family/caregiver demonstrates understanding of disease process, treatment plan, medications, and discharge instructions  Description: Complete learning assessment and assess knowledge base    Interventions:  - Provide teaching at level of understanding  - Provide teaching via preferred learning methods  Outcome: Progressing     Problem: SKIN/TISSUE INTEGRITY - ADULT  Goal: Skin Integrity remains intact(Skin Breakdown Prevention)  Description: Assess:  -Perform Biju assessment every shift, or 8 hours  -Clean and moisturize skin every episode of incontinence or as needed  -Inspect skin when repositioning, toileting, and assisting with ADLS  -Assess under medical devices such as masimo every two hours  -Assess extremities for adequate circulation and sensation     Bed Management:  -Have minimal linens on bed & keep smooth, unwrinkled  -Change linens as needed when moist or perspiring  -Avoid sitting or lying in one position for more than two hours while in bed  -Keep HOB at 30degrees     Toileting:  -Offer bedside commode  -Assess for incontinence every two hours   -Use incontinent care products after each incontinent episode such as barrier cream    Activity:  -Mobilize patient three times a day  -Encourage activity and walks on unit  -Encourage or provide ROM exercises   -Turn and reposition patient every two Hours  -Use appropriate equipment to lift or move patient in bed  -Instruct/ Assist with weight shifting every two when out of bed in chair  -Consider limitation of chair time two hour intervals    Skin Care:  -Avoid use of baby powder, tape, friction and shearing, hot water or constrictive clothing  -Relieve pressure over bony prominences using allevyns, pillows to elevate off bed, foam wedges  -Do not massage red bony areas    Next Steps:  -Teach patient strategies to minimize risks such as call for the nurse before trying to get out of bed   -Consider consults to  interdisciplinary teams such as pt/ot   Outcome: Progressing  Goal: Incision(s), wounds(s) or drain site(s) healing without S/S of infection  Description: INTERVENTIONS  - Assess and document dressing, incision, wound bed, drain sites and surrounding tissue  - Provide patient and family education  - Perform skin care/dressing changes every shift/8hrs or as needed due to soilage     Outcome: Progressing  Goal: Pressure injury heals and does not worsen  Description: Interventions:  - Implement low air loss mattress or specialty surface (Criteria met)  - Apply silicone foam dressing  - Instruct/assist with weight shifting every 15 minutes when in chair   - Limit chair time to two hour intervals  - Use special pressure reducing interventions such as waffle cushion when in chair   - Apply fecal or urinary incontinence containment device   - Perform passive or active ROM every 8hrs  - Turn and reposition patient & offload bony prominences every two hours   - Utilize friction reducing device or surface for transfers   - Consider consults to  interdisciplinary teams such as pt/ot  - Use incontinent care products after each incontinent episode such as incontinent cleanser, barrier cream   - Consider nutrition services referral as needed  Outcome: Progressing     Problem: Prexisting or High Potential for Compromised Skin Integrity  Goal: Skin integrity is maintained or improved  Description: INTERVENTIONS:  - Identify patients at risk for skin breakdown  - Assess and monitor skin integrity  - Assess and monitor nutrition and hydration status  - Monitor labs   - Assess for incontinence   - Turn and reposition patient  - Assist with mobility/ambulation  - Relieve pressure over bony prominences  - Avoid friction and shearing  - Provide appropriate hygiene as needed including keeping skin clean and dry  - Evaluate need for skin moisturizer/barrier cream  - Collaborate with interdisciplinary team   - Patient/family teaching  - Consider wound care consult   Outcome: Progressing

## 2022-06-19 NOTE — ED PROVIDER NOTES
History  Chief Complaint   Patient presents with    Fever - 9 weeks to 74 years     States temp  101  Neuropathic pain  L-ankle wound, ankle, shin reddened, warm  tender  60-year-old male with history of paraplegia AFib with RVR multifocal pneumonia and a left ankle medial malleolar pressure ulcer that is being followed by wound management  Patient started developing fever and chills today he noticed increased redness to his left shin and was concerned about possibility of infection so he presents for evaluation he recently switched to a calcium alginate dressing and he feels this is a contributing factor  He has had increasing drainage from the wound  The wound is been there on and off over 20 years it is secondary to his sleeping position  Patient denies any cough or upper respiratory complaints  He has no shortness of breath or chest pain he has not experienced any abdominal pain or changes to his bowels  Patient has a neurogenic bladder and self caths  He has not noticed any cloudy or foul-smelling urine  He otherwise denies any lightheadedness no known trauma  Prior to Admission Medications   Prescriptions Last Dose Informant Patient Reported? Taking?    TiZANidine (ZANAFLEX) 4 MG capsule 6/18/2022 at Unknown time  Yes Yes   Sig: Take 4 mg by mouth daily at bedtime   albuterol (Ventolin HFA) 90 mcg/act inhaler   No No   Sig: Inhale 2 puffs every 6 (six) hours as needed for wheezing   Patient not taking: Reported on 3/24/2022    apixaban (ELIQUIS) 2 5 mg   No No   Sig: Take 1 tablet (2 5 mg total) by mouth 2 (two) times a day   Patient not taking: Reported on 3/24/2022    ascorbic acid (VITAMIN C) 1000 MG tablet   No No   Sig: Take 1 tablet (1,000 mg total) by mouth 2 (two) times a day   Patient not taking: Reported on 3/24/2022    cholecalciferol (VITAMIN D3) 1,000 units tablet 6/18/2022 at Unknown time  No Yes   Sig: Take 2 tablets (2,000 Units total) by mouth daily   dexamethasone (DECADRON) 6 mg tablet   No No   Sig: Take 1 tablet (6 mg total) by mouth daily with breakfast   Patient not taking: Reported on 3/24/2022    diazepam (VALIUM) 5 mg tablet 6/18/2022 at Unknown time  Yes Yes   Sig: Take 10 mg by mouth daily at bedtime as needed for anxiety    gabapentin (NEURONTIN) 400 mg capsule 6/18/2022 at Unknown time  Yes Yes   Sig: Take 400 mg by mouth 3 (three) times a day   guaiFENesin (MUCINEX) 600 mg 12 hr tablet   No No   Sig: Take 1 tablet (600 mg total) by mouth every 12 (twelve) hours   Patient not taking: Reported on 3/24/2022    ibuprofen (MOTRIN) 400 mg tablet   No No   Sig: Take 1 tablet (400 mg total) by mouth every 6 (six) hours as needed for mild pain   Patient not taking: Reported on 3/24/2022    oxybutynin (DITROPAN-XL) 10 MG 24 hr tablet 6/18/2022 at Unknown time  Yes Yes   Sig: Take 10 mg by mouth 2 (two) times a day   triamcinolone (KENALOG) 0 5 % cream   No No   Sig: Apply topically 2 (two) times a day   zinc sulfate (ZINCATE) 220 mg capsule   No No   Sig: Take 1 capsule (220 mg total) by mouth 2 (two) times a day   Patient not taking: Reported on 3/24/2022       Facility-Administered Medications: None       Past Medical History:   Diagnosis Date    Back injuries     Neurogenic bladder     Paraplegia (HCC)     Renal disorder        Past Surgical History:   Procedure Laterality Date    BACK SURGERY      NEPHRECTOMY         Family History   Problem Relation Age of Onset    Cancer Brother      I have reviewed and agree with the history as documented  E-Cigarette/Vaping    E-Cigarette Use Never User      E-Cigarette/Vaping Substances     Social History     Tobacco Use    Smoking status: Never Smoker    Smokeless tobacco: Never Used   Vaping Use    Vaping Use: Never used   Substance Use Topics    Alcohol use: Never    Drug use: No       Review of Systems   Constitutional: Positive for chills and fever  Negative for activity change and appetite change     HENT: Negative for congestion, ear pain, rhinorrhea, sneezing and sore throat  Eyes: Negative for discharge  Respiratory: Negative for cough and shortness of breath  Cardiovascular: Positive for leg swelling (left leg)  Negative for chest pain  Gastrointestinal: Negative for abdominal pain, blood in stool, diarrhea, nausea and vomiting  Endocrine: Negative for polyuria  Genitourinary: Negative for difficulty urinating, dysuria, frequency and urgency  Musculoskeletal: Negative for back pain and myalgias  Skin: Positive for wound (left medial malleous)  Negative for rash  Neurological: Negative for dizziness, weakness, light-headedness, numbness and headaches  Hematological: Negative for adenopathy  Psychiatric/Behavioral: Negative for confusion  All other systems reviewed and are negative  Physical Exam  Physical Exam  Vitals and nursing note reviewed  Constitutional:       General: He is not in acute distress  Appearance: He is not ill-appearing, toxic-appearing or diaphoretic  HENT:      Head: Normocephalic and atraumatic  Right Ear: Tympanic membrane normal       Left Ear: Tympanic membrane normal       Nose: No congestion or rhinorrhea  Mouth/Throat:      Mouth: Mucous membranes are moist       Pharynx: No oropharyngeal exudate or posterior oropharyngeal erythema  Eyes:      General:         Right eye: No discharge  Left eye: No discharge  Extraocular Movements: Extraocular movements intact  Conjunctiva/sclera: Conjunctivae normal       Pupils: Pupils are equal, round, and reactive to light  Cardiovascular:      Rate and Rhythm: Regular rhythm  Tachycardia present  Musculoskeletal:      Cervical back: Normal range of motion and neck supple  No rigidity or tenderness  Lymphadenopathy:      Cervical: No cervical adenopathy  Neurological:      Mental Status: He is alert           Vital Signs  ED Triage Vitals [06/18/22 2103]   Temperature Pulse Respirations Blood Pressure SpO2   (!) 100 8 °F (38 2 °C) (!) 119 18 (!) 210/102 100 %      Temp Source Heart Rate Source Patient Position - Orthostatic VS BP Location FiO2 (%)   Tympanic Monitor Sitting Left arm --      Pain Score       7           Vitals:    06/18/22 2103 06/18/22 2230 06/18/22 2330 06/19/22 0245   BP: (!) 210/102 (!) 178/93 (!) 174/96 164/85   Pulse: (!) 119 (!) 119 (!) 114 85   Patient Position - Orthostatic VS: Sitting Sitting  Lying         Visual Acuity      ED Medications  Medications   lidocaine (XYLOCAINE) 2 % topical gel 1 application (1 application Urethral Not Given 6/19/22 0027)   lidocaine (URO-JET) 2 % urethral/mucosal gel **ADS Override Pull** (has no administration in time range)   tetanus-diphtheria-acellular pertussis (BOOSTRIX) IM injection 0 5 mL (0 5 mL Intramuscular Given 6/18/22 2219)   piperacillin-tazobactam (ZOSYN) IVPB 4 5 g (0 g Intravenous Stopped 6/18/22 2323)   vancomycin (VANCOCIN) 1250 mg in sodium chloride 0 9% 250 mL IVPB (0 mg/kg × 87 5 kg Intravenous Stopped 6/19/22 0233)   lactated ringers bolus 2,625 mL (0 mL/kg × 87 5 kg Intravenous Stopped 6/19/22 0050)   acetaminophen (TYLENOL) tablet 650 mg (650 mg Oral Given 6/18/22 2328)   clindamycin (CLEOCIN) IVPB (premix in dextrose) 900 mg 50 mL (0 mg Intravenous Stopped 6/19/22 0109)   iohexol (OMNIPAQUE) 350 MG/ML injection (MULTI-DOSE) 70 mL (70 mL Intravenous Given 6/19/22 0029)   gabapentin (NEURONTIN) capsule 800 mg (800 mg Oral Given 6/19/22 0045)       Diagnostic Studies  Results Reviewed     Procedure Component Value Units Date/Time    COVID/FLU/RSV - 2 hour TAT [[id number]]  (Normal) Collected: 06/18/22 2208    Lab Status: Final result Specimen: Nares from Nose Updated: 06/19/22 0009     SARS-CoV-2 Negative     INFLUENZA A PCR Negative     INFLUENZA B PCR Negative     RSV PCR Negative    Narrative:      FOR PEDIATRIC PATIENTS - copy/paste COVID Guidelines URL to browser: [email or web address]    SARS-CoV-2 assay is a Nucleic Acid Amplification assay intended for the  qualitative detection of nucleic acid from SARS-CoV-2 in nasopharyngeal  swabs  Results are for the presumptive identification of SARS-CoV-2 RNA  Positive results are indicative of infection with SARS-CoV-2, the virus  causing COVID-19, but do not rule out bacterial infection or co-infection  with other viruses  Laboratories within the [place] and its  territories are required to report all positive results to the appropriate  public health authorities  Negative results do not preclude SARS-CoV-2  infection and should not be used as the sole basis for treatment or other  patient management decisions  Negative results must be combined with  clinical observations, patient history, and epidemiological information  This test has not been FDA cleared or approved  This test has been authorized by FDA under an Emergency Use Authorization  (EUA)  This test is only authorized for the duration of time the  declaration that circumstances exist justifying the authorization of the  emergency use of an in vitro diagnostic tests for detection of SARS-CoV-2  virus and/or diagnosis of COVID-19 infection under section 564(b)(1) of  the Act, 21 U  S C  535PPF-3(S)(7), unless the authorization is terminated  or revoked sooner  The test has been validated but independent review by FDA  and CLIA is pending  Test performed using [employer] GeneXpert: This RT-PCR assay targets N2,  a region unique to SARS-CoV-2  A conserved region in the E-gene was chosen  for pan-Sarbecovirus detection which includes SARS-CoV-2      Hepatic function panel [[id number]]  (Abnormal) Collected: 06/18/22 2208    Lab Status: Final result Specimen: Blood from Arm, Right Updated: 06/18/22 2257     Total Bilirubin 0 53 mg/dL      Bilirubin, Direct 0 11 mg/dL      Alkaline Phosphatase 99 U/L      AST 25 U/L ALT 43 U/L      Total Protein 8 8 g/dL      Albumin 4 4 g/dL     Magnesium [[id number]]  (Normal) Collected: 06/18/22 2208    Lab Status: Final result Specimen: Blood from Arm, Right Updated: 06/18/22 2257     Magnesium 2 0 mg/dL     C-reactive protein [[id number]]  (Abnormal) Collected: 06/18/22 2208    Lab Status: Final result Specimen: Blood from Arm, Right Updated: 06/18/22 2257     CRP 64 6 mg/L     TSH, 3rd generation with Free T4 reflex [[id number]]  (Normal) Collected: 06/18/22 2208    Lab Status: Final result Specimen: Blood from Arm, Right Updated: 06/18/22 2257     TSH 3RD GENERATON 2 820 uIU/mL     Narrative:      Patients undergoing fluorescein dye angiography may retain small amounts of fluorescein in the body for 48-72 hours post procedure  Samples containing fluorescein can produce falsely depressed TSH values  If the patient had this procedure,a specimen should be resubmitted post fluorescein clearance  CK Total with Reflex CKMB [[id number]]  (Normal) Collected: 06/18/22 2208    Lab Status: Final result Specimen: Blood from Arm, Right Updated: 06/18/22 2257     Total  U/L     Urine Microscopic [[id number]]  (Abnormal) Collected: 06/18/22 2231    Lab Status: Final result Specimen: Urine, Straight Cath Updated: 06/18/22 2253     RBC, UA 4-10 /hpf      WBC, UA 4-10 /hpf      Epithelial Cells Occasional /hpf      Bacteria, UA Occasional /hpf      MUCUS THREADS Occasional    High Sensitivity Troponin I Random [[id number]]  (Abnormal) Collected: 06/18/22 2208    Lab Status: Final result Specimen: Blood from Arm, Right Updated: 06/18/22 2247     HS TnI random 3 ng/L     Lactic acid [[id number]]  (Normal) Collected: 06/18/22 2208    Lab Status: Final result Specimen: Blood from Arm, Right Updated: 06/18/22 2241     LACTIC ACID 1 0 mmol/L     Narrative:      Result may be elevated if tourniquet was used during collection      UA w Reflex to Microscopic w Reflex to Culture [[id number]]  (Abnormal) Collected: 06/18/22 2231    Lab Status: Final result Specimen: Urine, Straight Cath Updated: 06/18/22 2241     Color, UA Yellow     Clarity, UA Slightly Cloudy     Specific [place], UA 1 020     pH, UA 6 5     Leukocytes, UA Small     Nitrite, UA Negative     Protein, UA Negative mg/dl      Glucose, UA Negative mg/dl      Ketones, UA Negative mg/dl      Urobilinogen, UA 0 2 E U /dl      Bilirubin, UA Negative     Blood, UA Negative    Basic metabolic panel [[id number]]  (Abnormal) Collected: 06/18/22 2208    Lab Status: Final result Specimen: Blood from Arm, Right Updated: 06/18/22 2238     Sodium 138 mmol/L      Potassium 3 4 mmol/L      Chloride 98 mmol/L      CO2 29 mmol/L      ANION GAP 11 mmol/L      BUN 13 mg/dL      Creatinine 1 00 mg/dL      Glucose 94 mg/dL      Calcium 9 1 mg/dL      eGFR 87 ml/min/1 73sq m     Narrative:      [place] guidelines for Chronic Kidney Disease (CKD):     Stage 1 with normal or high GFR (GFR > 90 mL/min/1 73 square meters)    Stage 2 Mild CKD (GFR = 60-89 mL/min/1 73 square meters)    Stage 3A Moderate CKD (GFR = 45-59 mL/min/1 73 square meters)    Stage 3B Moderate CKD (GFR = 30-44 mL/min/1 73 square meters)    Stage 4 Severe CKD (GFR = 15-29 mL/min/1 73 square meters)    Stage 5 End Stage CKD (GFR <15 mL/min/1 73 square meters)  Note: GFR calculation is accurate only with a steady state creatinine    Wound culture and Gram stain [[id number]] Collected: 06/18/22 2231    Lab Status:  In process Specimen: Wound from Ankle Updated: 06/18/22 2237    Protime-INR [[id number]]  (Normal) Collected: 06/18/22 2208    Lab Status: Final result Specimen: Blood from Arm, Right Updated: 06/18/22 2235     Protime 12 8 seconds      INR 1 01    APTT [[id number]]  (Normal) Collected: 06/18/22 2208    Lab Status: Final result Specimen: Blood from Arm, Right Updated: 06/18/22 2235     PTT 33 seconds     CBC and differential [[id number]]  (Abnormal) Collected: 06/18/22 2208    Lab Status: Final result Specimen: Blood from Arm, Right Updated: 06/18/22 2223     WBC 14 74 Thousand/uL      RBC 5 53 Million/uL      Hemoglobin 15 3 g/dL      Hematocrit 46 6 %      MCV 84 fL      MCH 27 7 pg      MCHC 32 8 g/dL      RDW 13 0 %      MPV 11 0 fL      Platelets 621 Thousands/uL      nRBC 0 /100 WBCs      Neutrophils Relative 88 %      Immat GRANS % 0 %      Lymphocytes Relative 7 %      Monocytes Relative 4 %      Eosinophils Relative 1 %      Basophils Relative 0 %      Neutrophils Absolute 12 87 Thousands/µL      Immature Grans Absolute 0 06 Thousand/uL      Lymphocytes Absolute 1 00 Thousands/µL      Monocytes Absolute 0 62 Thousand/µL      Eosinophils Absolute 0 13 Thousand/µL      Basophils Absolute 0 06 Thousands/µL     Blood culture #1 [[id number]] Collected: 06/18/22 2208    Lab Status: In process Specimen: Blood from Arm, Right Updated: 06/18/22 2220    Blood culture #2 [[id number]] Collected: 06/18/22 2208    Lab Status:  In process Specimen: Blood from Arm, Right Updated: 06/18/22 2220                 CT lower extremity w contrast left   ED Interpretation by Ellis Leonardo MD (06/19 3781)   [facility] Dr  [place]:  No acute osseous abnormalities small shallow subcutaneous ulcer medial aspect the left ankle with diffuse subcutaneous cellulitis incidentally noted diffuse edema of the anterior compartment musculature of the right calf with coarse calcifications which is nonspecific there is general subcu edema of the right      CT abdomen pelvis w contrast   ED Interpretation by Ellis Leonardo MD (06/19 6687)   [facility] [address]  [place]:  Diffuse ground-glass opacities at the periphery of visualized right lung and left bases compatible with infectious or inflammatory infiltrates particularly COVID-19 pneumonia no acute findings in the abdomen or pelvis right kidney is absent      XR ankle 2 views LEFT   ED Interpretation by Ellis Leonardo MD (06/18 2302)   Read by me; Radiologist to provide formal interpretation  Medial soft tissue defect to ankle c/w open wound ? Gas proximally and inferior to medial malleolus        XR tibia fibula 2 views LEFT   ED Interpretation by Krissy Molina MD (06/18 2303)   Read by me; Radiologist to provide formal interpretation  No acute fracture or air in soft tissues  XR chest 1 view portable   ED Interpretation by Krissy Molina MD (06/18 2302)   Read by me; Radiologist to provide formal interpretation  No acute process                 Procedures  ECG 12 Lead Documentation Only    Date/Time: 6/18/2022 11:22 PM  Performed by: Krissy Molina MD  Authorized by: Krissy Molina MD     Indications / Diagnosis:  Tachy  ECG reviewed by me, the ED Provider: yes    Patient location:  ED  Previous ECG:     Previous ECG:  Compared to current    Comparison ECG info:  10/05/21 1623    Similarity:  No change  Rate:     ECG rate:  112    ECG rate assessment: tachycardic    Rhythm:     Rhythm: sinus tachycardia    QRS:     QRS axis:  Normal  Comments:      No acute ischemic changes             ED Course  ED Course as of 06/19/22 0319   Sat Jun 18, 2022   2224 As patient is presenting with signs and symptoms of sepsis recommended a 30 mL/kilos bolus of lactated Ringer's  Patient refuses due to the fact that he self caths  Because he has a neurogenic bladder  He was offered a Grimm catheter patient is ultimately agreeable but will only agree currently to fluids at 100 mL/hour     2324 OK'ed use of contrast with Dr Xi Rodriguez to eval for abscess with CT of LLE    Sun Jun 19, 2022   0259 [facility] results of CTs entered into media tab no evidence of nectrotizing soft tissue infection will contact [facility] for hosptialization   Jennifer Malcolm [address] Dr Ganesh Zambrano recommends inpt admission                                             MDM  Number of Diagnoses or Management Options  Left leg cellulitis  Paraplegia ([address] )  Pressure ulcer  Sepsis ([address] )  Diagnosis management comments: Mdm:  Patient with evidence of sepsis history of fever with tachycardia  Will initiate 30 per kilos mL bolus of LR and administer vanco Zosyn and clindamycin  Evaluate for other causes of sepsis such as pneumonia or intra-abdominal infection and re-evaluate  Did not proceed with venous Doppler ultrasounds of the lower extremities secondary to test not deep being available until tomorrow morning      Disposition  Final diagnoses:   Sepsis ([facility] [address] )   Pressure ulcer - chronic of left ankle medial malleolus   Left leg cellulitis   Paraplegia (HCC) - t12 burst fracture; h/o neurogenic bladder   Myositis - chronic of Rt anterior compartment seen on CT of lower ext     Time reflects when diagnosis was documented in both MDM as applicable and the Disposition within this note     Time User Action Codes Description Comment    6/18/2022 11:56 PM Marcheta Abdullahi Add [A41 9] Sepsis ([facility] [address] )     6/18/2022 11:58 PM Marcheta Abdullahi Add [L89 90] Pressure ulcer     6/18/2022 11:58 PM Marcheta Abdullahi Modify [L89 90] Pressure ulcer chronic of left ankle medial malleolus    6/18/2022 11:59 PM Marcheta Abdullahi Add [B47 419] Left leg cellulitis     6/19/2022 12:00 AM Wendy Graves O Add [G82 20] Paraplegia ([facility] [address] )     6/19/2022 12:00 AM Marcheta Abdullahi Modify [G82 20] Paraplegia ([facility] [address] ) t12 burst fracture; h/o neurogenic bladder    6/19/2022  2:58 AM Marcheta Abdullahi Add [M60 9] Myositis     6/19/2022  2:59 AM Marcheta Abdullahi Modify [M60 9] Myositis chronic of Rt anterior compartment seen on CT of lower ext      ED Disposition     ED Disposition   Admit    Condition   Stable    Date/Time   Sun Jun 19, 2022  2:59 AM    Comment   Case was discussed with Dr Jerad Dumont and the patient's admission status was agreed to be Admission Status: inpatient status to the service of Dr Jerad Dumont   Follow-up Information    None         Patient's Medications   Discharge Prescriptions    No medications on file       No discharge procedures on file      PDMP Review     None          ED Provider  Electronically Signed by           Krissy Molina MD  06/19/22 3771

## 2022-06-20 ENCOUNTER — APPOINTMENT (INPATIENT)
Dept: NON INVASIVE DIAGNOSTICS | Facility: HOSPITAL | Age: 52
DRG: 416 | End: 2022-06-20
Payer: OTHER MISCELLANEOUS

## 2022-06-20 ENCOUNTER — APPOINTMENT (INPATIENT)
Dept: MRI IMAGING | Facility: HOSPITAL | Age: 52
DRG: 416 | End: 2022-06-20
Payer: OTHER MISCELLANEOUS

## 2022-06-20 LAB
ANION GAP SERPL CALCULATED.3IONS-SCNC: 8 MMOL/L (ref 4–13)
BACTERIA UR CULT: NORMAL
BASOPHILS # BLD AUTO: 0.05 THOUSANDS/ΜL (ref 0–0.1)
BASOPHILS NFR BLD AUTO: 1 % (ref 0–1)
BUN SERPL-MCNC: 10 MG/DL (ref 5–25)
CALCIUM SERPL-MCNC: 8.5 MG/DL (ref 8.3–10.1)
CHLORIDE SERPL-SCNC: 105 MMOL/L (ref 100–108)
CO2 SERPL-SCNC: 27 MMOL/L (ref 21–32)
CREAT SERPL-MCNC: 0.95 MG/DL (ref 0.6–1.3)
EOSINOPHIL # BLD AUTO: 0.18 THOUSAND/ΜL (ref 0–0.61)
EOSINOPHIL NFR BLD AUTO: 3 % (ref 0–6)
ERYTHROCYTE [DISTWIDTH] IN BLOOD BY AUTOMATED COUNT: 13.4 % (ref 11.6–15.1)
GFR SERPL CREATININE-BSD FRML MDRD: 92 ML/MIN/1.73SQ M
GLUCOSE SERPL-MCNC: 107 MG/DL (ref 65–140)
HCT VFR BLD AUTO: 35.5 % (ref 36.5–49.3)
HGB BLD-MCNC: 11.3 G/DL (ref 12–17)
IMM GRANULOCYTES # BLD AUTO: 0.02 THOUSAND/UL (ref 0–0.2)
IMM GRANULOCYTES NFR BLD AUTO: 0 % (ref 0–2)
LYMPHOCYTES # BLD AUTO: 1.1 THOUSANDS/ΜL (ref 0.6–4.47)
LYMPHOCYTES NFR BLD AUTO: 17 % (ref 14–44)
MCH RBC QN AUTO: 27.9 PG (ref 26.8–34.3)
MCHC RBC AUTO-ENTMCNC: 31.8 G/DL (ref 31.4–37.4)
MCV RBC AUTO: 88 FL (ref 82–98)
MONOCYTES # BLD AUTO: 0.51 THOUSAND/ΜL (ref 0.17–1.22)
MONOCYTES NFR BLD AUTO: 8 % (ref 4–12)
NEUTROPHILS # BLD AUTO: 4.71 THOUSANDS/ΜL (ref 1.85–7.62)
NEUTS SEG NFR BLD AUTO: 71 % (ref 43–75)
NRBC BLD AUTO-RTO: 0 /100 WBCS
PLATELET # BLD AUTO: 192 THOUSANDS/UL (ref 149–390)
PMV BLD AUTO: 10.7 FL (ref 8.9–12.7)
POTASSIUM SERPL-SCNC: 4 MMOL/L (ref 3.5–5.3)
PROCALCITONIN SERPL-MCNC: <0.05 NG/ML
RBC # BLD AUTO: 4.05 MILLION/UL (ref 3.88–5.62)
SODIUM SERPL-SCNC: 140 MMOL/L (ref 136–145)
WBC # BLD AUTO: 6.57 THOUSAND/UL (ref 4.31–10.16)

## 2022-06-20 PROCEDURE — G1004 CDSM NDSC: HCPCS

## 2022-06-20 PROCEDURE — 97163 PT EVAL HIGH COMPLEX 45 MIN: CPT

## 2022-06-20 PROCEDURE — 85025 COMPLETE CBC W/AUTO DIFF WBC: CPT | Performed by: FAMILY MEDICINE

## 2022-06-20 PROCEDURE — 80048 BASIC METABOLIC PNL TOTAL CA: CPT | Performed by: FAMILY MEDICINE

## 2022-06-20 PROCEDURE — 73720 MRI LWR EXTREMITY W/O&W/DYE: CPT

## 2022-06-20 PROCEDURE — 97530 THERAPEUTIC ACTIVITIES: CPT

## 2022-06-20 PROCEDURE — 93970 EXTREMITY STUDY: CPT

## 2022-06-20 PROCEDURE — A9585 GADOBUTROL INJECTION: HCPCS | Performed by: FAMILY MEDICINE

## 2022-06-20 PROCEDURE — 93970 EXTREMITY STUDY: CPT | Performed by: SURGERY

## 2022-06-20 PROCEDURE — 99232 SBSQ HOSP IP/OBS MODERATE 35: CPT | Performed by: FAMILY MEDICINE

## 2022-06-20 PROCEDURE — 84145 PROCALCITONIN (PCT): CPT | Performed by: FAMILY MEDICINE

## 2022-06-20 RX ADMIN — PIPERACILLIN AND TAZOBACTAM 3.38 G: 3; .375 INJECTION, POWDER, LYOPHILIZED, FOR SOLUTION INTRAVENOUS at 04:59

## 2022-06-20 RX ADMIN — GABAPENTIN 400 MG: 400 CAPSULE ORAL at 23:13

## 2022-06-20 RX ADMIN — PIPERACILLIN AND TAZOBACTAM 3.38 G: 3; .375 INJECTION, POWDER, LYOPHILIZED, FOR SOLUTION INTRAVENOUS at 16:01

## 2022-06-20 RX ADMIN — GABAPENTIN 800 MG: 400 CAPSULE ORAL at 11:06

## 2022-06-20 RX ADMIN — PIPERACILLIN AND TAZOBACTAM 3.38 G: 3; .375 INJECTION, POWDER, LYOPHILIZED, FOR SOLUTION INTRAVENOUS at 11:06

## 2022-06-20 RX ADMIN — PIPERACILLIN AND TAZOBACTAM 3.38 G: 3; .375 INJECTION, POWDER, LYOPHILIZED, FOR SOLUTION INTRAVENOUS at 23:13

## 2022-06-20 RX ADMIN — GADOBUTROL 8 ML: 604.72 INJECTION INTRAVENOUS at 10:09

## 2022-06-20 RX ADMIN — OXYBUTYNIN CHLORIDE 10 MG: 5 TABLET, EXTENDED RELEASE ORAL at 23:13

## 2022-06-20 RX ADMIN — GABAPENTIN 800 MG: 400 CAPSULE ORAL at 16:01

## 2022-06-20 RX ADMIN — Medication 2000 UNITS: at 11:06

## 2022-06-20 RX ADMIN — DIAZEPAM 10 MG: 5 TABLET ORAL at 23:13

## 2022-06-20 RX ADMIN — TIZANIDINE 4 MG: 4 TABLET ORAL at 23:13

## 2022-06-20 NOTE — PLAN OF CARE
Problem: MOBILITY - ADULT  Goal: Maintain or return to baseline ADL function  Description: INTERVENTIONS:  -  Assess patient's ability to carry out ADLs; assess patient's baseline for ADL function and identify physical deficits which impact ability to perform ADLs (bathing, care of mouth/teeth, toileting, grooming, dressing, etc )  - Assess/evaluate cause of self-care deficits   - Assess range of motion  - Assess patient's mobility; develop plan if impaired  - Assess patient's need for assistive devices and provide as appropriate  - Encourage maximum independence but intervene and supervise when necessary  - Involve family in performance of ADLs  - Assess for home care needs following discharge   - Consider OT consult to assist with ADL evaluation and planning for discharge  - Provide patient education as appropriate  Outcome: Progressing  Goal: Maintains/Returns to pre admission functional level  Description: INTERVENTIONS:  - Perform BMAT or MOVE assessment daily    - Set and communicate daily mobility goal to care team and patient/family/caregiver  - Collaborate with rehabilitation services on mobility goals if consulted  - Perform Range of Motion three times a day  - Reposition patient every two hours    - Dangle patient three times a day  - Out of bed to chair three times a day   - Out of bed for meals three times a day  - Out of bed for toileting  - Record patient progress and toleration of activity level   Outcome: Progressing     Problem: Potential for Falls  Goal: Patient will remain free of falls  Description: INTERVENTIONS:  - Educate patient/family on patient safety including physical limitations  - Instruct patient to call for assistance with activity   - Consult OT/PT to assist with strengthening/mobility   - Keep Call bell within reach  - Keep bed low and locked with side rails adjusted as appropriate  - Keep care items and personal belongings within reach  - Initiate and maintain comfort rounds  - Make Fall Risk Sign visible to staff  - Offer Toileting every two Hours, in advance of need  - Initiate/Maintain bed alarm  - Obtain necessary fall risk management equipment: non slip socks  - Apply yellow socks and bracelet for high fall risk patients  - Consider moving patient to room near nurses station  Outcome: Progressing     Problem: PAIN - ADULT  Goal: Verbalizes/displays adequate comfort level or baseline comfort level  Description: Interventions:  - Encourage patient to monitor pain and request assistance  - Assess pain using appropriate pain scale  - Administer analgesics based on type and severity of pain and evaluate response  - Implement non-pharmacological measures as appropriate and evaluate response  - Consider cultural and social influences on pain and pain management  - Notify physician/advanced practitioner if interventions unsuccessful or patient reports new pain  Outcome: Progressing     Problem: INFECTION - ADULT  Goal: Absence or prevention of progression during hospitalization  Description: INTERVENTIONS:  - Assess and monitor for signs and symptoms of infection  - Monitor lab/diagnostic results  - Monitor all insertion sites, i e  indwelling lines, tubes, and drains  - [place] appropriate cooling/warming therapies per order  - Administer medications as ordered  - Instruct and encourage patient and family to use good hand hygiene technique  - Identify and instruct in appropriate isolation precautions for identified infection/condition  Outcome: Progressing     Problem: SAFETY ADULT  Goal: Maintain or return to baseline ADL function  Description: INTERVENTIONS:  -  Assess patient's ability to carry out ADLs; assess patient's baseline for ADL function and identify physical deficits which impact ability to perform ADLs (bathing, care of mouth/teeth, toileting, grooming, dressing, etc )  - Assess/evaluate cause of self-care deficits   - Assess range of motion  - Assess patient's mobility; develop plan if impaired  - Assess patient's need for assistive devices and provide as appropriate  - Encourage maximum independence but intervene and supervise when necessary  - Involve family in performance of ADLs  - Assess for home care needs following discharge   - Consider OT consult to assist with ADL evaluation and planning for discharge  - Provide patient education as appropriate  Outcome: Progressing  Goal: Maintains/Returns to pre admission functional level  Description: INTERVENTIONS:  - Perform BMAT or MOVE assessment daily    - Set and communicate daily mobility goal to care team and patient/family/caregiver  - Collaborate with rehabilitation services on mobility goals if consulted  - Perform Range of Motion three times a day  - Reposition patient every two hours    - Dangle patient three times a day  - Out of bed to chair three times a day   - Out of bed for meals three times a day  - Out of bed for toileting  - Record patient progress and toleration of activity level   Outcome: Progressing  Goal: Patient will remain free of falls  Description: INTERVENTIONS:  - Educate patient/family on patient safety including physical limitations  - Instruct patient to call for assistance with activity   - Consult OT/PT to assist with strengthening/mobility   - Keep Call bell within reach  - Keep bed low and locked with side rails adjusted as appropriate  - Keep care items and personal belongings within reach  - Initiate and maintain comfort rounds  - Make Fall Risk Sign visible to staff  - Offer Toileting every two Hours, in advance of need  - Initiate/Maintain bed alarm  - Obtain necessary fall risk management equipment: non slip socks  - Apply yellow socks and bracelet for high fall risk patients  - Consider moving patient to room near nurses station  Outcome: Progressing     Problem: DISCHARGE PLANNING  Goal: Discharge to home or other facility with appropriate resources  Description: INTERVENTIONS:  - Identify barriers to discharge w/patient and caregiver  - Arrange for needed discharge resources and transportation as appropriate  - Identify discharge learning needs (meds, wound care, etc )  - Arrange for interpretive services to assist at discharge as needed  - Refer to Case Management Department for coordinating discharge planning if the patient needs post-hospital services based on physician/advanced practitioner order or complex needs related to functional status, cognitive ability, or social support system  Outcome: Progressing     Problem: Knowledge Deficit  Goal: Patient/family/caregiver demonstrates understanding of disease process, treatment plan, medications, and discharge instructions  Description: Complete learning assessment and assess knowledge base    Interventions:  - Provide teaching at level of understanding  - Provide teaching via preferred learning methods  Outcome: Progressing     Problem: SKIN/TISSUE INTEGRITY - ADULT  Goal: Skin Integrity remains intact(Skin Breakdown Prevention)  Description: Assess:  -Perform Biju assessment every shift, or 8 hours  -Clean and moisturize skin every episode of incontinence or as needed  -Inspect skin when repositioning, toileting, and assisting with ADLS  -Assess under medical devices such as masimo every two hours  -Assess extremities for adequate circulation and sensation     Bed Management:  -Have minimal linens on bed & keep smooth, unwrinkled  -Change linens as needed when moist or perspiring  -Avoid sitting or lying in one position for more than two hours while in bed  -Keep HOB at 30degrees     Toileting:  -Offer bedside commode  -Assess for incontinence every two hours   -Use incontinent care products after each incontinent episode such as barrier cream    Activity:  -Mobilize patient three times a day  -Encourage activity and walks on unit  -Encourage or provide ROM exercises   -Turn and reposition patient every two Hours  -Use appropriate equipment to lift or move patient in bed  -Instruct/ Assist with weight shifting every two when out of bed in chair  -Consider limitation of chair time two hour intervals    Skin Care:  -Avoid use of baby powder, tape, friction and shearing, hot water or constrictive clothing  -Relieve pressure over bony prominences using allevyns, pillows to elevate off bed, foam wedges  -Do not massage red bony areas    Next Steps:  -Teach patient strategies to minimize risks such as call for the nurse before trying to get out of bed   -Consider consults to  interdisciplinary teams such as pt/ot   Outcome: Progressing  Goal: Incision(s), wounds(s) or drain site(s) healing without S/S of infection  Description: INTERVENTIONS  - Assess and document dressing, incision, wound bed, drain sites and surrounding tissue  - Provide patient and family education  - Perform skin care/dressing changes every shift/8hrs or as needed due to soilage     Outcome: Progressing  Goal: Pressure injury heals and does not worsen  Description: Interventions:  - Implement low air loss mattress or specialty surface (Criteria met)  - Apply silicone foam dressing  - Instruct/assist with weight shifting every 15 minutes when in chair   - Limit chair time to two hour intervals  - Use special pressure reducing interventions such as waffle cushion when in chair   - Apply fecal or urinary incontinence containment device   - Perform passive or active ROM every 8hrs  - Turn and reposition patient & offload bony prominences every two hours   - Utilize friction reducing device or surface for transfers   - Consider consults to  interdisciplinary teams such as pt/ot  - Use incontinent care products after each incontinent episode such as incontinent cleanser, barrier cream   - Consider nutrition services referral as needed  Outcome: Progressing     Problem: Prexisting or High Potential for Compromised Skin Integrity  Goal: Skin integrity is maintained or improved  Description: INTERVENTIONS:  - Identify patients at risk for skin breakdown  - Assess and monitor skin integrity  - Assess and monitor nutrition and hydration status  - Monitor labs   - Assess for incontinence   - Turn and reposition patient  - Assist with mobility/ambulation  - Relieve pressure over bony prominences  - Avoid friction and shearing  - Provide appropriate hygiene as needed including keeping skin clean and dry  - Evaluate need for skin moisturizer/barrier cream  - Collaborate with interdisciplinary team   - Patient/family teaching  - Consider wound care consult   Outcome: Progressing

## 2022-06-20 NOTE — PROGRESS NOTES
[address] [facility] [address]  Progress Note - Oscar Hodges 1970, 49 y o  male MRN: [id number]  Unit/Bed#: 470-31 Encounter: [id number]  Primary Care Provider: Lizzie Hui PA-C   Date and time admitted to hospital: 6/18/2022  9:01 PM    * Sepsis without septic shock [facility])  Assessment & Plan  · Patient reported temperature 101° F at home ,temperature maximum in the emergency room 38 2C,  heart rate 119< WBC 14 7 , leukocytosis trending down since December ,lactic acid 1  · CT of left lower extremity did not reveal yesika gas collection but patient reports his chronic left malleolus wound getting worse and he noted erythema to the left lower extremity so will treat sepsis secondary to most likely cellulitis - per report: "There is a 2 3 x 1 1 x 3 cm focal area of low-attenuation within the soleus muscle in the lower leg, seen in image 242 series 2, image 101 series 306   This may be due to evolving area of for myonecrosis or evolving fluid collection  [facility]"   · MRI reviewed, fortunately no evidence of collection identified  · Continue broad-spectrum antibiotics  · Continue IV fluids antiemetic antipyretic  · Monitor WBC and vital signs      Intermittent self-catheterization of bladder  Assessment & Plan  Urinalysis showed mild pyuria  Continue current antibiotic    Pressure injury of left ankle, stage 3 ([address] )  Assessment & Plan  Patient follow-up with wound clinic as outpatient  Management as above    Paraplegia ([address] )  Assessment & Plan  Secondary to T12 fracture  self catheterization due to neurogenic blood  Supportive care          VTE Pharmacologic Prophylaxis: VTE Score: 4 Moderate Risk (Score 3-4) - Pharmacological DVT Prophylaxis Ordered: enoxaparin (Lovenox)  Patient Centered Rounds: I performed bedside rounds with nursing staff today  Discussions with Specialists or Other Care Team Provider: Podiatry    Education and Discussions with Family / Patient: Patient   Time Spent for Care: 30 minutes  More than 50% of total time spent on counseling and coordination of care as described above  Current Length of Stay: 1 day(s)  Current Patient Status: Inpatient   Certification Statement: The patient will continue to require additional inpatient hospital stay due to close monitoring, IV Rx   Discharge Plan: Anticipate discharge in 24-48 hrs to home  Code Status: Level 1 - Full Code    Subjective:   Patient seen and examined  He reports feeling improvement still with some residual left lower extremity pain  Afebrile  Objective:     Vitals:   Temp (24hrs), Av 6 °F (36 4 °C), Min:97 5 °F (36 4 °C), Max:97 7 °F (36 5 °C)    Temp:  [97 5 °F (36 4 °C)-97 7 °F (36 5 °C)] 97 6 °F (36 4 °C)  HR:  [68-88] 88  Resp:  [17-20] 20  BP: ()/(58-89) 152/89  SpO2:  [95 %-100 %] 100 %  Body mass index is 26 9 kg/m²  Input and Output Summary (last 24 hours): Intake/Output Summary (Last 24 hours) at 2022 1512  Last data filed at 2022 1359  Gross per 24 hour   Intake 720 ml   Output 1000 ml   Net -280 ml       Physical Exam:   Physical Exam  Constitutional:       General: He is not in acute distress  HENT:      Head: Normocephalic and atraumatic  Nose: No congestion  Mouth/Throat:      Pharynx: Oropharynx is clear  Eyes:      Conjunctiva/sclera: Conjunctivae normal    Cardiovascular:      Rate and Rhythm: Normal rate and regular rhythm  Heart sounds: No murmur heard  Pulmonary:      Effort: No respiratory distress  Breath sounds: No wheezing or rales  Abdominal:      General: There is no distension  Tenderness: There is no abdominal tenderness  There is no guarding  Musculoskeletal:      Right lower leg: Edema present  Left lower leg: Edema present     Skin:     Comments: L ankle pressure ulcer with surrounding erythema           Additional Data:     Labs:  Results from last 7 days   Lab Units 22  0455   WBC Thousand/uL 6  57   HEMOGLOBIN g/dL 11 3*   HEMATOCRIT % 35 5*   PLATELETS Thousands/uL 192   NEUTROS PCT % 71   LYMPHS PCT % 17   MONOS PCT % 8   EOS PCT % 3     Results from last 7 days   Lab Units 06/20/22 0455 06/19/22 0430 06/18/22 2208   SODIUM mmol/L 140   < > 138   POTASSIUM mmol/L 4 0   < > 3 4*   CHLORIDE mmol/L 105   < > 98*   CO2 mmol/L 27   < > 29   BUN mg/dL 10   < > 13   CREATININE mg/dL 0 95   < > 1 00   ANION GAP mmol/L 8   < > 11   CALCIUM mg/dL 8 5   < > 9 1   ALBUMIN g/dL  --   --  4 4   TOTAL BILIRUBIN mg/dL  --   --  0 53   ALK PHOS U/L  --   --  99   ALT U/L  --   --  43   AST U/L  --   --  25   GLUCOSE RANDOM mg/dL 107   < > 94    < > = values in this interval not displayed  Results from last 7 days   Lab Units 06/18/22 2208   INR  1 01             Results from last 7 days   Lab Units 06/20/22 0455 06/19/22 0430 06/18/22 2208   LACTIC ACID mmol/L  --   --  1 0   PROCALCITONIN ng/ml <0 05 <0 05  --        Lines/Drains:  Invasive Devices  Report    Peripheral Intravenous Line  Duration           Peripheral IV 06/19/22 Left;Proximal;Ventral (anterior) Forearm 1 day          Drain  Duration           Urethral Catheter Latex; Non-latex 18 Fr  1 day              Urinary Catheter:  Goal for removal: neurogenic bladder                Imaging: Reviewed radiology reports from this admission including: MRI LLE     Recent Cultures (last 7 days):   Results from last 7 days   Lab Units 06/18/22 2231 06/18/22 2208   BLOOD CULTURE   --  No Growth at 24 hrs  No Growth at 24 hrs     GRAM STAIN RESULT  No Polys or Bacteria seen*  1+ Gram positive cocci in clusters*  --    WOUND CULTURE  4+ Growth of Staphylococcus aureus*  --        Last 24 Hours Medication List:   Current Facility-Administered Medications   Medication Dose Route Frequency Provider Last Rate    acetaminophen  650 mg Oral Q6H PRN Karen Harrington MD      cholecalciferol  2,000 Units Oral Daily Karen Harrington MD      diazepam  10 mg Oral HS PRN Troy Carrillo MD      docusate sodium  100 mg Oral BID Troy Carrillo MD      enoxaparin  40 mg Subcutaneous Daily Troy Carrillo MD      gabapentin  400 mg Oral HS Gavin Curry MD      gabapentin  800 mg Oral BID Gavin Curry MD      ondansetron  4 mg Intravenous Q6H PRN Troy Carrillo MD      oxybutynin  10 mg Oral HS Troy Carrillo MD      piperacillin-tazobactam  3 375 g Intravenous Q6H Troy Carrillo MD 3 375 g (06/20/22 1106)    simethicone  80 mg Oral 4x Daily PRN Troy Carrillo MD      tiZANidine  4 mg Oral HS Troy Carrillo MD      triamcinolone   Topical BID Remington Grullon MD          Today, Patient Was Seen By: Ibrahima Inman MD    **Please Note: This note may have been constructed using a voice recognition system  **

## 2022-06-20 NOTE — PLAN OF CARE
Problem: MOBILITY - ADULT  Goal: Maintain or return to baseline ADL function  Description: INTERVENTIONS:  -  Assess patient's ability to carry out ADLs; assess patient's baseline for ADL function and identify physical deficits which impact ability to perform ADLs (bathing, care of mouth/teeth, toileting, grooming, dressing, etc )  - Assess/evaluate cause of self-care deficits   - Assess range of motion  - Assess patient's mobility; develop plan if impaired  - Assess patient's need for assistive devices and provide as appropriate  - Encourage maximum independence but intervene and supervise when necessary  - Involve family in performance of ADLs  - Assess for home care needs following discharge   - Consider OT consult to assist with ADL evaluation and planning for discharge  - Provide patient education as appropriate  Outcome: Progressing  Goal: Maintains/Returns to pre admission functional level  Description: INTERVENTIONS:  - Perform BMAT or MOVE assessment daily    - Set and communicate daily mobility goal to care team and patient/family/caregiver  - Collaborate with rehabilitation services on mobility goals if consulted  - Perform Range of Motion three times a day  - Reposition patient every two hours    - Dangle patient three times a day  - Out of bed to chair three times a day   - Out of bed for meals three times a day  - Out of bed for toileting  - Record patient progress and toleration of activity level   Outcome: Progressing     Problem: Potential for Falls  Goal: Patient will remain free of falls  Description: INTERVENTIONS:  - Educate patient/family on patient safety including physical limitations  - Instruct patient to call for assistance with activity   - Consult OT/PT to assist with strengthening/mobility   - Keep Call bell within reach  - Keep bed low and locked with side rails adjusted as appropriate  - Keep care items and personal belongings within reach  - Initiate and maintain comfort rounds  - Make Fall Risk Sign visible to staff  - Offer Toileting every two Hours, in advance of need  - Initiate/Maintain bed alarm  - Obtain necessary fall risk management equipment: non slip socks  - Apply yellow socks and bracelet for high fall risk patients  - Consider moving patient to room near nurses station  Outcome: Progressing     Problem: PAIN - ADULT  Goal: Verbalizes/displays adequate comfort level or baseline comfort level  Description: Interventions:  - Encourage patient to monitor pain and request assistance  - Assess pain using appropriate pain scale  - Administer analgesics based on type and severity of pain and evaluate response  - Implement non-pharmacological measures as appropriate and evaluate response  - Consider cultural and social influences on pain and pain management  - Notify physician/advanced practitioner if interventions unsuccessful or patient reports new pain  Outcome: Progressing     Problem: INFECTION - ADULT  Goal: Absence or prevention of progression during hospitalization  Description: INTERVENTIONS:  - Assess and monitor for signs and symptoms of infection  - Monitor lab/diagnostic results  - Monitor all insertion sites, i e  indwelling lines, tubes, and drains  - Odum appropriate cooling/warming therapies per order  - Administer medications as ordered  - Instruct and encourage patient and family to use good hand hygiene technique  - Identify and instruct in appropriate isolation precautions for identified infection/condition  Outcome: Progressing     Problem: SAFETY ADULT  Goal: Maintain or return to baseline ADL function  Description: INTERVENTIONS:  -  Assess patient's ability to carry out ADLs; assess patient's baseline for ADL function and identify physical deficits which impact ability to perform ADLs (bathing, care of mouth/teeth, toileting, grooming, dressing, etc )  - Assess/evaluate cause of self-care deficits   - Assess range of motion  - Assess patient's mobility; develop plan if impaired  - Assess patient's need for assistive devices and provide as appropriate  - Encourage maximum independence but intervene and supervise when necessary  - Involve family in performance of ADLs  - Assess for home care needs following discharge   - Consider OT consult to assist with ADL evaluation and planning for discharge  - Provide patient education as appropriate  Outcome: Progressing  Goal: Maintains/Returns to pre admission functional level  Description: INTERVENTIONS:  - Perform BMAT or MOVE assessment daily    - Set and communicate daily mobility goal to care team and patient/family/caregiver  - Collaborate with rehabilitation services on mobility goals if consulted  - Perform Range of Motion three times a day  - Reposition patient every two hours    - Dangle patient three times a day  - Out of bed to chair three times a day   - Out of bed for meals three times a day  - Out of bed for toileting  - Record patient progress and toleration of activity level   Outcome: Progressing  Goal: Patient will remain free of falls  Description: INTERVENTIONS:  - Educate patient/family on patient safety including physical limitations  - Instruct patient to call for assistance with activity   - Consult OT/PT to assist with strengthening/mobility   - Keep Call bell within reach  - Keep bed low and locked with side rails adjusted as appropriate  - Keep care items and personal belongings within reach  - Initiate and maintain comfort rounds  - Make Fall Risk Sign visible to staff  - Offer Toileting every two Hours, in advance of need  - Initiate/Maintain bed alarm  - Obtain necessary fall risk management equipment: non slip socks  - Apply yellow socks and bracelet for high fall risk patients  - Consider moving patient to room near nurses station  Outcome: Progressing     Problem: DISCHARGE PLANNING  Goal: Discharge to home or other facility with appropriate resources  Description: INTERVENTIONS:  - Identify barriers to discharge w/patient and caregiver  - Arrange for needed discharge resources and transportation as appropriate  - Identify discharge learning needs (meds, wound care, etc )  - Arrange for interpretive services to assist at discharge as needed  - Refer to Case Management Department for coordinating discharge planning if the patient needs post-hospital services based on physician/advanced practitioner order or complex needs related to functional status, cognitive ability, or social support system  Outcome: Progressing     Problem: Knowledge Deficit  Goal: Patient/family/caregiver demonstrates understanding of disease process, treatment plan, medications, and discharge instructions  Description: Complete learning assessment and assess knowledge base    Interventions:  - Provide teaching at level of understanding  - Provide teaching via preferred learning methods  Outcome: Progressing     Problem: SKIN/TISSUE INTEGRITY - ADULT  Goal: Skin Integrity remains intact(Skin Breakdown Prevention)  Description: Assess:  -Perform Biju assessment every shift, or 8 hours  -Clean and moisturize skin every episode of incontinence or as needed  -Inspect skin when repositioning, toileting, and assisting with ADLS  -Assess under medical devices such as masimo every two hours  -Assess extremities for adequate circulation and sensation     Bed Management:  -Have minimal linens on bed & keep smooth, unwrinkled  -Change linens as needed when moist or perspiring  -Avoid sitting or lying in one position for more than two hours while in bed  -Keep HOB at 30degrees     Toileting:  -Offer bedside commode  -Assess for incontinence every two hours   -Use incontinent care products after each incontinent episode such as barrier cream    Activity:  -Mobilize patient three times a day  -Encourage activity and walks on unit  -Encourage or provide ROM exercises   -Turn and reposition patient every two Hours  -Use appropriate equipment to lift or move patient in bed  -Instruct/ Assist with weight shifting every two when out of bed in chair  -Consider limitation of chair time two hour intervals    Skin Care:  -Avoid use of baby powder, tape, friction and shearing, hot water or constrictive clothing  -Relieve pressure over bony prominences using allevyns, pillows to elevate off bed, foam wedges  -Do not massage red bony areas    Next Steps:  -Teach patient strategies to minimize risks such as call for the nurse before trying to get out of bed   -Consider consults to  interdisciplinary teams such as pt/ot   Outcome: Progressing  Goal: Incision(s), wounds(s) or drain site(s) healing without S/S of infection  Description: INTERVENTIONS  - Assess and document dressing, incision, wound bed, drain sites and surrounding tissue  - Provide patient and family education  - Perform skin care/dressing changes every shift/8hrs or as needed due to soilage     Outcome: Progressing  Goal: Pressure injury heals and does not worsen  Description: Interventions:  - Implement low air loss mattress or specialty surface (Criteria met)  - Apply silicone foam dressing  - Instruct/assist with weight shifting every 15 minutes when in chair   - Limit chair time to two hour intervals  - Use special pressure reducing interventions such as waffle cushion when in chair   - Apply fecal or urinary incontinence containment device   - Perform passive or active ROM every 8hrs  - Turn and reposition patient & offload bony prominences every two hours   - Utilize friction reducing device or surface for transfers   - Consider consults to  interdisciplinary teams such as pt/ot  - Use incontinent care products after each incontinent episode such as incontinent cleanser, barrier cream   - Consider nutrition services referral as needed  Outcome: Progressing     Problem: Prexisting or High Potential for Compromised Skin Integrity  Goal: Skin integrity is maintained or improved  Description: INTERVENTIONS:  - Identify patients at risk for skin breakdown  - Assess and monitor skin integrity  - Assess and monitor nutrition and hydration status  - Monitor labs   - Assess for incontinence   - Turn and reposition patient  - Assist with mobility/ambulation  - Relieve pressure over bony prominences  - Avoid friction and shearing  - Provide appropriate hygiene as needed including keeping skin clean and dry  - Evaluate need for skin moisturizer/barrier cream  - Collaborate with interdisciplinary team   - Patient/family teaching  - Consider wound care consult   Outcome: Progressing

## 2022-06-20 NOTE — UTILIZATION REVIEW
Initial Clinical Review    Admission: Date/Time/Statement:   Admission Orders (From admission, onward)     Ordered        06/19/22 0318  INPATIENT ADMISSION  Once                      Orders Placed This Encounter   Procedures    INPATIENT ADMISSION     Standing Status:   Standing     Number of Occurrences:   1     Order Specific Question:   Level of Care     Answer:   Med Surg [16]     Order Specific Question:   Estimated length of stay     Answer:   More than 2 Midnights     Order Specific Question:   Certification     Answer:   I certify that inpatient services are medically necessary for this patient for a duration of greater than two midnights  See H&P and MD Progress Notes for additional information about the patient's course of treatment  ED Arrival Information     Expected   -    Arrival   6/18/2022 20:51    Acuity   Urgent            Means of arrival   Wheelchair    Escorted by   Self    Service   Hospitalist    Admission type   Urgent            Arrival complaint   Fever           Chief Complaint   Patient presents with    Fever - 9 weeks to 74 years     States temp  101  Neuropathic pain  L-ankle wound, ankle, shin reddened, warm  tender  Initial Presentation: 49 y o  male presents to ed from home for evaluation and treatment of fever 101, neuropathic pain left ankle wound with redness, warmth  PMHX:  AFIB, PARAPLEGIA, PNEUMONIA  Clinical assessment significant for fever 100 8, tachycardia, hypertension, pain 7/10  Wound culture of ankle  And blood cultures obtained  CRP 64 8, WBC 14 74  Imaging shows 3 X1 1 X 3 CM left lower extremity area concerning for myonecrosis or fluid, MRI recommended  Initially treated with tetanus booster, iv zosyn, iv vancomycin, iv lactated ringers bolus, iv cleocin  Admit to inpatient med surg for left leg cellulitis, pressure ulcer and sepsis complicated by paraplegia      Date: 6- 20-22  Day 2: inpatient med surg   Plan to continue iv fluids 75/hr, iv piperacillin  Podiatry consult completed  Mri completed today showing edema and cellulitis  Venous duplex ordered  PT evaluations completed - no rehab needs identified  Consult podiatry  1  Stage II pressure ulcer left medial ankle  2  MRI pending to eval for osteomyelitis at wound site and rule out any soleus pathology (from CT read)     Plan:  Continue IV antibiotics, CBC normalized, procalcitonin normal, afebrile  CT findings more likely artifact or unrelated to wound as wound is superficial and not adjacent to soleus and there are no signs of ascending infection or abscess to lower leg  MRI pending to further assess lower leg and to evaluate medial malleolus which is underlying wound site for osteomyelitis    Xeroform/Allevyn dressing applied, change daily         ED Triage Vitals [06/18/22 2103]   (!) 100 8 °F   (38 2 °C) (!) 119 18 (!) 210/102 100 %      Tympanic Monitor         Pain Score       7          06/19/22 87 5 kg (192 lb 14 4 oz)     Additional Vital Signs:     Date/Time Temp Pulse Resp BP MAP (mmHg) SpO2 O2 Device   06/20/22 07:48:39 97 5 °F (36 4 °C) 68 17 129/78 95 96 % None (Room air)   06/20/22 02:36:14 -- 74 -- 122/79 93 95 % --   06/20/22 02:24:44 97 7 °F (36 5 °C) 73 18 96/58 71 97 % --   06/19/22 16:36:25 97 6 °F (36 4 °C) 68 17 134/77 96 99 % --   06/19/22 07:16:46 97 3 °F (36 3 °C) Abnormal  90 19 107/69 82 97 % None (Room air)   06/19/22 03:56:01 98 °F (36 7 °C) 85 18 149/88 108 93 % --   06/19/22 0245 -- 85 20 164/85 -- 92 % None (Room air)   06/18/22 2330 -- 114   Abnormal  23   Abnormal  174/96   Abnormal  126 97 % --   06/18/22 2230 -- 119   Abnormal  18 178/93   Abnormal  130 98 % None (Room air)   06/18/22 2103 100 8 °F (38 2 °C)   Abnormal  119   Abnormal  18 210/102   Abnormal  -- 100 % None (Room air)           Pertinent Labs/Diagnostic Test Results:       Date/Time: 6/18/2022 11:22 PM       Indications / Diagnosis:  Tachy  ECG reviewed by me, the ED Provider: yes Patient location:  ED  Previous ECG:     Previous ECG:  Compared to current    Comparison ECG info:  10/05/21 1623    Similarity:  No change  Rate:     ECG rate:  112    ECG rate assessment: tachycardic    Rhythm:     Rhythm: sinus tachycardia    QRS:     QRS axis:  Normal  Comments:      No acute ischemic changes       MRI tibia fibula left w wo contrast   Final  (06/20 1103)      Diffuse subcutaneous edema which may represent cellulitis or venous stasis  No focal rim-enhancing fluid collection to suggest the presence of abscess  No intramuscular fluid collection to suggest abscess  Focal mild edema distal lateral soleus may represent mild focal myositis or low-grade muscle strain  No evidence of acute osteomyelitis  CT lower extremity w contrast left   ED (06/19 0251)    No acute osseous abnormalities small shallow subcutaneous ulcer medial aspect the left ankle with diffuse subcutaneous cellulitis incidentally noted diffuse edema of the anterior compartment musculature of the right calf with coarse calcifications which is nonspecific there is general subcu edema of the right      Final (06/19 1122)      Subcutaneous ulceration of the medial aspect of the ankle joint with associated the superficial cellulitic changes in the leg      There is a 2 3 x 1 1 x 3 cm focal area of low-attenuation within the soleus muscle in the lower leg, seen in image 242 series 2, image 101 series 306  This may be due to evolving area of for myonecrosis or evolving fluid collection    Consider MRI with contrast         No collection with air-fluid level seen            CT abdomen pelvis w contrast   ED (06/19 0255)    Diffuse ground-glass opacities at the periphery of visualized right lung and left bases compatible with infectious or inflammatory infiltrates particularly COVID-19 pneumonia no acute findings in the abdomen or pelvis right kidney is absent      Final  (06/19 1103)      Heterogeneity noted within the prostate gland involving the the right gland extending from the base to the apex and in the left mid gland, Not seen on the previous study of December 11, 2021  Suggest evaluation with the nonemergent MRI to exclude prostate fluid collection          No intra-abdominal fluid collection      Bibasilar densities, more pronounced from the previous study of the December 11, 2021, May represent dependent atelectasis or chronic interstitial lung disease  Consider follow-up chest CT when the acute illness subsides  Follow-up chest CT can be    performed in prone position  7 mm nodular density at the right lung base, image 6 series 2 and also reassess at the time of prone CT chest      No bowel obstruction seen      Initial report provided by [facility] service             XR ankle 2 views LEFT      Final  (06/19 1122)   Cellulitis with ulceration overlying the medial malleolus  No evidence of subcutaneous emphysema  No acute osseous abnormality  If there is continued concern for necrotizing fasciitis, consider follow-up orthopedic surgical evaluation  Workstation performed: [id number]         XR tibia fibula 2 views LEFT      Final    (06/19 1118)   Soft tissue swelling, most compatible with cellulitis  No acute osseous abnormality  Correlate with the follow-up CT scan of the left lower extremity  XR chest 1 view portable      Final   (06/19 1124)   No active pulmonary disease on examination which is somewhat limited secondary to low lung volumes          Results from last 7 days   Lab Units 06/18/22 2208   SARS-COV-2  Negative     Results from last 7 days   Lab Units 06/20/22  0455 06/19/22  0430 06/18/22 2208   WBC Thousand/uL 6 57 12 82* 14 74*   HEMOGLOBIN g/dL 11 3* 12 9 15 3   HEMATOCRIT % 35 5* 39 6 46 6   PLATELETS Thousands/uL 192 196 235   NEUTROS ABS Thousands/µL 4 71  --  12 87*         Results from last 7 days   Lab Units 06/20/22  0455 06/19/22  0430 06/18/22  2208   SODIUM mmol/L 140 138 138   POTASSIUM mmol/L 4 0 3 6 3 4*   CHLORIDE mmol/L 105 102 98*   CO2 mmol/L 27 29 29   ANION GAP mmol/L 8 7 11   BUN mg/dL 10 10 13   CREATININE mg/dL 0 95 1 05 1 00   EGFR ml/min/1 73sq m 92 82 87   CALCIUM mg/dL 8 5 8 4 9 1   MAGNESIUM mg/dL  --   --  2 0     Results from last 7 days   Lab Units 06/18/22  2208   AST U/L 25   ALT U/L 43   ALK PHOS U/L 99   TOTAL PROTEIN g/dL 8 8*   ALBUMIN g/dL 4 4   TOTAL BILIRUBIN mg/dL 0 53   BILIRUBIN DIRECT mg/dL 0 11         Results from last 7 days   Lab Units 06/20/22  0455 06/19/22  0430 06/18/22  2208   GLUCOSE RANDOM mg/dL 107 144* 94         Results from last 7 days   Lab Units 06/18/22  2208   CK TOTAL U/L 145             Results from last 7 days   Lab Units 06/18/22  2208   PROTIME seconds 12 8   INR  1 01   PTT seconds 33     Results from last 7 days   Lab Units 06/18/22  2208   TSH 3RD GENERATON uIU/mL 2 820     Results from last 7 days   Lab Units 06/20/22  0455 06/19/22  0430   PROCALCITONIN ng/ml <0 05 <0 05     Results from last 7 days   Lab Units 06/18/22 2208   LACTIC ACID mmol/L 1 0         Results from last 7 days   Lab Units 06/18/22  2208   CRP mg/L 64 6*         Results from last 7 days   Lab Units 06/18/22  2231   CLARITY UA  Slightly Cloudy   COLOR UA  Yellow   SPEC GRAV UA  1 020   PH UA  6 5   GLUCOSE UA mg/dl Negative   KETONES UA mg/dl Negative   BLOOD UA  Negative   PROTEIN UA mg/dl Negative   NITRITE UA  Negative   BILIRUBIN UA  Negative   UROBILINOGEN UA E U /dl 0 2   LEUKOCYTES UA  Small*   WBC UA /hpf 4-10*   RBC UA /hpf 4-10*   BACTERIA UA /hpf Occasional   EPITHELIAL CELLS WET PREP /hpf Occasional   MUCUS THREADS  Occasional*     Results from last 7 days   Lab Units 06/18/22  2208   INFLUENZA A PCR  Negative   INFLUENZA B PCR  Negative   RSV PCR  Negative             Results from last 7 days   Lab Units 06/18/22  2231 06/18/22 2208   BLOOD CULTURE   --  Received in Microbiology Lab   Culture in Progress  Received in Microbiology Lab  Culture in Progress     GRAM STAIN RESULT  No Polys or Bacteria seen*  1+ Gram positive cocci in clusters*  --                ED Treatment:   Medication Administration from 06/18/2022 2051 to 06/19/2022 0348       Date/Time Order Dose Route Action     06/18/2022 2219 tetanus-diphtheria-acellular pertussis (BOOSTRIX) IM injection 0 5 mL 0 5 mL Intramuscular Given     06/18/2022 2253 piperacillin-tazobactam (ZOSYN) IVPB 4 5 g 4 5 g Intravenous New Bag     06/19/2022 0103 vancomycin (VANCOCIN) 1250 mg in sodium chloride 0 9% 250 mL IVPB 1,250 mg Intravenous New Bag     06/18/2022 2250 lactated ringers bolus 2,625 mL 2,625 mL Intravenous New Bag     06/18/2022 2328 acetaminophen (TYLENOL) tablet 650 mg 650 mg Oral Given     06/19/2022 0027 clindamycin (CLEOCIN) IVPB (premix in dextrose) 900 mg 50 mL 900 mg Intravenous New Bag     06/19/2022 0029 iohexol (OMNIPAQUE) 350 MG/ML injection (MULTI-DOSE) 70 mL 70 mL Intravenous Given     06/19/2022 0045 gabapentin (NEURONTIN) capsule 800 mg 800 mg Oral Given        Past Medical History:   Diagnosis    Back injuries    Neurogenic bladder    Paraplegia ([facility] [address] )    Renal disorder     Present on Admission:   Sepsis without septic shock ([address] )   Pressure injury of left ankle, stage 3 (HCC)   Hypokalemia   Paraplegia (HCC)   Intermittent self-catheterization of bladder      Admitting Diagnosis:     Paraplegia (HCC) [G82 20]  Myositis [M60 9]  Fever [R50 9]  Left leg cellulitis [L03 116]  Pressure ulcer [L89 90]  Sepsis ([address] ) [A41 9]    Age/Sex: 49 y o  male    Scheduled Medications:    cholecalciferol, 2,000 Units, Oral, Daily  docusate sodium, 100 mg, Oral, BID  enoxaparin, 40 mg, Subcutaneous, Daily  gabapentin, 400 mg, Oral, HS  gabapentin, 800 mg, Oral, BID  oxybutynin, 10 mg, Oral, HS  piperacillin-tazobactam, 3 375 g, Intravenous, Q6H  tiZANidine, 4 mg, Oral, HS  triamcinolone, , Topical, BID      Continuous IV Infusions:     PRN Meds:  acetaminophen, 650 mg, Oral, Q6H PRN  diazepam, 10 mg, Oral, HS PRN  ondansetron, 4 mg, Intravenous, Q6H PRN  simethicone, 80 mg, Oral, 4x Daily PRN        IP CONSULT TO PODIATRY    Network Utilization Review Department  ATTENTION: Please call with any questions or concerns to [phone] and carefully listen to the prompts so that you are directed to the right person  All voicemails are confidential   G[facility] all requests for admission clinical reviews, approved or denied determinations and any other requests to dedicated fax number below belonging to the campus where the patient is receiving treatment   List of dedicated fax numbers for the Facilities:  [address] DENIALS (Administrative/Medical Necessity) [phone]   [address] (Maternity/NICU/Pediatrics) [phone]   [address] [place] [address]  [address] [id number] [place] [address] [address] [facility] [phone]

## 2022-06-20 NOTE — CONSULTS
Consultation - P O  Kelli Adry Garduno 49 y o  male MRN: [id number]  Unit/Bed#: 262-23 Encounter: [id number]    Assessment/Plan     Assessment:    1  Stage II pressure ulcer left medial ankle  2  MRI pending to eval for osteomyelitis at wound site and rule out any soleus pathology (from CT read)    Plan:  Continue IV antibiotics, CBC normalized, procalcitonin normal, afebrile  CT findings more likely artifact or unrelated to wound as wound is superficial and not adjacent to soleus and there are no signs of ascending infection or abscess to lower leg  MRI pending to further assess lower leg and to evaluate medial malleolus which is underlying wound site for osteomyelitis  Xeroform/Allevyn dressing applied, change daily  History of Present Illness   HPI:  Catia Kay is a 49 y o  male who presents with a wound located at left medial ankle  The wound has been present for over 2year(s)  The wound is heals occasionally then recurs  Wound is pressure related, patient has no feeling to lower extremities due to prior injury  Treated at [facility]  Presented with sepsis with wound site as likely source  Feels good this AM, no f/c/ns, no other acute complaints        Inpatient consult to Podiatry  Consult performed by: Devonte Li DPM  Consult ordered by: Ge Kenney MD          Review of Systems  Reviewed, negative/noncontributory    Historical Information   Past Medical History:   Diagnosis Date    Back injuries     Neurogenic bladder     Paraplegia ([address] )     Renal disorder      Past Surgical History:   Procedure Laterality Date    BACK SURGERY      NEPHRECTOMY       Social History   Social History     Substance and Sexual Activity   Alcohol Use Never     Social History     Substance and Sexual Activity   Drug Use No     E-Cigarette/Vaping    E-Cigarette Use Never User      E-Cigarette/Vaping Substances     Social History     Tobacco Use   Smoking Status Never Smoker Smokeless Tobacco Never Used     Family History: non-contributory    Meds/Allergies   all current active meds have been reviewed  Allergies   Allergen Reactions    Codeine Hives    Dilaudid [Hydromorphone] Other (See Comments)     Patient is unable to sleep     Morphine And Related Hives       Objective   Vitals: Blood pressure 129/78, pulse 68, temperature 97 5 °F (36 4 °C), temperature source Oral, resp  rate 17, height 5' 11" (1 803 m), weight 87 5 kg (192 lb 14 4 oz), SpO2 96 %  Wounds:     Wound 03/24/22 Pressure Injury Ankle Left;Medial (Active)   Wound Image   06/19/22 0359   Wound Description Beefy red 06/20/22 0147   Pressure Injury Stage 2 06/20/22 0147   Tory-wound Assessment Fragile; Intact 06/20/22 0147   Wound Length (cm) 2 cm 06/19/22 0413   Wound Width (cm) 1 7 cm 06/19/22 0413   Wound Surface Area (cm^2) 3 4 cm^2 06/19/22 0413   Drainage Amount Scant 06/20/22 0147   Drainage Description Bloody;Purulent 06/20/22 0147   Treatments Cleansed 06/20/22 0147   Dressing Non adherent; Moisture barrier;ABD;Gauze 06/20/22 0147   Wound packed? No 06/20/22 0147   Patient Tolerance Tolerated well 06/20/22 0147   Dressing Status Clean;Dry; Intact 06/20/22 0147         Physical Exam     Derm:  Wound site as measured and pictured above  Scant serosanguinous drainage  Minimal remaining periwound erythema  No ascending cellulitis  No probe to bone or tracking sinus  No signs of infection or abscess to lower leg, musculature is soft on palpation  Vascular:  DP and PT pulses 2/4  Neuro:  Protective sensation absent both feet      Lab Results: I have personally reviewed pertinent reports  Imaging: I have personally reviewed pertinent reports  EKG, Pathology, and Other Studies: I have personally reviewed pertinent reports        Code Status: Level 1 - Full Code  Advance Directive and Living Will:      Power of :    POLST:      Counseling / Coordination of Care  Total floor / unit time spent today 45 minutes  Greater than 50% of total time was spent with the patient and / or family counseling and / or coordination of care  A description of the counseling / coordination of care: F/U at wound center on discharge

## 2022-06-20 NOTE — PLAN OF CARE
Problem: MOBILITY - ADULT  Goal: Maintain or return to baseline ADL function  Description: INTERVENTIONS:  -  Assess patient's ability to carry out ADLs; assess patient's baseline for ADL function and identify physical deficits which impact ability to perform ADLs (bathing, care of mouth/teeth, toileting, grooming, dressing, etc )  - Assess/evaluate cause of self-care deficits   - Assess range of motion  - Assess patient's mobility; develop plan if impaired  - Assess patient's need for assistive devices and provide as appropriate  - Encourage maximum independence but intervene and supervise when necessary  - Involve family in performance of ADLs  - Assess for home care needs following discharge   - Consider OT consult to assist with ADL evaluation and planning for discharge  - Provide patient education as appropriate  Outcome: Progressing  Goal: Maintains/Returns to pre admission functional level  Description: INTERVENTIONS:  - Perform BMAT or MOVE assessment daily    - Set and communicate daily mobility goal to care team and patient/family/caregiver  - Collaborate with rehabilitation services on mobility goals if consulted  - Perform Range of Motion three times a day  - Reposition patient every two hours    - Dangle patient three times a day  - Out of bed to chair three times a day   - Out of bed for meals three times a day  - Out of bed for toileting  - Record patient progress and toleration of activity level   Outcome: Progressing     Problem: Potential for Falls  Goal: Patient will remain free of falls  Description: INTERVENTIONS:  - Educate patient/family on patient safety including physical limitations  - Instruct patient to call for assistance with activity   - Consult OT/PT to assist with strengthening/mobility   - Keep Call bell within reach  - Keep bed low and locked with side rails adjusted as appropriate  - Keep care items and personal belongings within reach  - Initiate and maintain comfort rounds  - Make Fall Risk Sign visible to staff  - Offer Toileting every two Hours, in advance of need  - Initiate/Maintain bed alarm  - Obtain necessary fall risk management equipment: non slip socks  - Apply yellow socks and bracelet for high fall risk patients  - Consider moving patient to room near nurses station  Outcome: Progressing     Problem: PAIN - ADULT  Goal: Verbalizes/displays adequate comfort level or baseline comfort level  Description: Interventions:  - Encourage patient to monitor pain and request assistance  - Assess pain using appropriate pain scale  - Administer analgesics based on type and severity of pain and evaluate response  - Implement non-pharmacological measures as appropriate and evaluate response  - Consider cultural and social influences on pain and pain management  - Notify physician/advanced practitioner if interventions unsuccessful or patient reports new pain  Outcome: Progressing     Problem: INFECTION - ADULT  Goal: Absence or prevention of progression during hospitalization  Description: INTERVENTIONS:  - Assess and monitor for signs and symptoms of infection  - Monitor lab/diagnostic results  - Monitor all insertion sites, i e  indwelling lines, tubes, and drains  - [place] appropriate cooling/warming therapies per order  - Administer medications as ordered  - Instruct and encourage patient and family to use good hand hygiene technique  - Identify and instruct in appropriate isolation precautions for identified infection/condition  Outcome: Progressing     Problem: SAFETY ADULT  Goal: Maintain or return to baseline ADL function  Description: INTERVENTIONS:  -  Assess patient's ability to carry out ADLs; assess patient's baseline for ADL function and identify physical deficits which impact ability to perform ADLs (bathing, care of mouth/teeth, toileting, grooming, dressing, etc )  - Assess/evaluate cause of self-care deficits   - Assess range of motion  - Assess patient's mobility; develop plan if impaired  - Assess patient's need for assistive devices and provide as appropriate  - Encourage maximum independence but intervene and supervise when necessary  - Involve family in performance of ADLs  - Assess for home care needs following discharge   - Consider OT consult to assist with ADL evaluation and planning for discharge  - Provide patient education as appropriate  Outcome: Progressing  Goal: Maintains/Returns to pre admission functional level  Description: INTERVENTIONS:  - Perform BMAT or MOVE assessment daily    - Set and communicate daily mobility goal to care team and patient/family/caregiver  - Collaborate with rehabilitation services on mobility goals if consulted  - Perform Range of Motion three times a day  - Reposition patient every two hours    - Dangle patient three times a day  - Out of bed to chair three times a day   - Out of bed for meals three times a day  - Out of bed for toileting  - Record patient progress and toleration of activity level   Outcome: Progressing  Goal: Patient will remain free of falls  Description: INTERVENTIONS:  - Educate patient/family on patient safety including physical limitations  - Instruct patient to call for assistance with activity   - Consult OT/PT to assist with strengthening/mobility   - Keep Call bell within reach  - Keep bed low and locked with side rails adjusted as appropriate  - Keep care items and personal belongings within reach  - Initiate and maintain comfort rounds  - Make Fall Risk Sign visible to staff  - Offer Toileting every two Hours, in advance of need  - Initiate/Maintain bed alarm  - Obtain necessary fall risk management equipment: non slip socks  - Apply yellow socks and bracelet for high fall risk patients  - Consider moving patient to room near nurses station  Outcome: Progressing     Problem: DISCHARGE PLANNING  Goal: Discharge to home or other facility with appropriate resources  Description: INTERVENTIONS:  - Identify barriers to discharge w/patient and caregiver  - Arrange for needed discharge resources and transportation as appropriate  - Identify discharge learning needs (meds, wound care, etc )  - Arrange for interpretive services to assist at discharge as needed  - Refer to Case Management Department for coordinating discharge planning if the patient needs post-hospital services based on physician/advanced practitioner order or complex needs related to functional status, cognitive ability, or social support system  Outcome: Progressing     Problem: Knowledge Deficit  Goal: Patient/family/caregiver demonstrates understanding of disease process, treatment plan, medications, and discharge instructions  Description: Complete learning assessment and assess knowledge base    Interventions:  - Provide teaching at level of understanding  - Provide teaching via preferred learning methods  Outcome: Progressing     Problem: SKIN/TISSUE INTEGRITY - ADULT  Goal: Skin Integrity remains intact(Skin Breakdown Prevention)  Description: Assess:  -Perform Biju assessment every shift, or 8 hours  -Clean and moisturize skin every episode of incontinence or as needed  -Inspect skin when repositioning, toileting, and assisting with ADLS  -Assess under medical devices such as masimo every two hours  -Assess extremities for adequate circulation and sensation     Bed Management:  -Have minimal linens on bed & keep smooth, unwrinkled  -Change linens as needed when moist or perspiring  -Avoid sitting or lying in one position for more than two hours while in bed  -Keep HOB at 30degrees     Toileting:  -Offer bedside commode  -Assess for incontinence every two hours   -Use incontinent care products after each incontinent episode such as barrier cream    Activity:  -Mobilize patient three times a day  -Encourage activity and walks on unit  -Encourage or provide ROM exercises   -Turn and reposition patient every two Hours  -Use appropriate equipment to lift or move patient in bed  -Instruct/ Assist with weight shifting every two when out of bed in chair  -Consider limitation of chair time two hour intervals    Skin Care:  -Avoid use of baby powder, tape, friction and shearing, hot water or constrictive clothing  -Relieve pressure over bony prominences using allevyns, pillows to elevate off bed, foam wedges  -Do not massage red bony areas    Next Steps:  -Teach patient strategies to minimize risks such as call for the nurse before trying to get out of bed   -Consider consults to  interdisciplinary teams such as pt/ot   Outcome: Progressing  Goal: Incision(s), wounds(s) or drain site(s) healing without S/S of infection  Description: INTERVENTIONS  - Assess and document dressing, incision, wound bed, drain sites and surrounding tissue  - Provide patient and family education  - Perform skin care/dressing changes every shift/8hrs or as needed due to soilage     Outcome: Progressing  Goal: Pressure injury heals and does not worsen  Description: Interventions:  - Implement low air loss mattress or specialty surface (Criteria met)  - Apply silicone foam dressing  - Instruct/assist with weight shifting every 15 minutes when in chair   - Limit chair time to two hour intervals  - Use special pressure reducing interventions such as waffle cushion when in chair   - Apply fecal or urinary incontinence containment device   - Perform passive or active ROM every 8hrs  - Turn and reposition patient & offload bony prominences every two hours   - Utilize friction reducing device or surface for transfers   - Consider consults to  interdisciplinary teams such as pt/ot  - Use incontinent care products after each incontinent episode such as incontinent cleanser, barrier cream   - Consider nutrition services referral as needed  Outcome: Progressing     Problem: Prexisting or High Potential for Compromised Skin Integrity  Goal: Skin integrity is maintained or improved  Description: INTERVENTIONS:  - Identify patients at risk for skin breakdown  - Assess and monitor skin integrity  - Assess and monitor nutrition and hydration status  - Monitor labs   - Assess for incontinence   - Turn and reposition patient  - Assist with mobility/ambulation  - Relieve pressure over bony prominences  - Avoid friction and shearing  - Provide appropriate hygiene as needed including keeping skin clean and dry  - Evaluate need for skin moisturizer/barrier cream  - Collaborate with interdisciplinary team   - Patient/family teaching  - Consider wound care consult   Outcome: Progressing

## 2022-06-20 NOTE — ASSESSMENT & PLAN NOTE
· Patient reported temperature 101° F at home ,temperature maximum in the emergency room 38 2C,  heart rate 119< WBC 14 7 , leukocytosis trending down since December ,lactic acid 1  · CT of left lower extremity did not reveal yesika gas collection but patient reports his chronic left malleolus wound getting worse and he noted erythema to the left lower extremity so will treat sepsis secondary to most likely cellulitis - per report: "There is a 2 3 x 1 1 x 3 cm focal area of low-attenuation within the soleus muscle in the lower leg, seen in image 242 series 2, image 101 series 306   This may be due to evolving area of for myonecrosis or evolving fluid collection   Consider MRI with contrast"   · MRI reviewed, fortunately no evidence of collection identified  · Continue broad-spectrum antibiotics  · Continue IV fluids antiemetic antipyretic  · Monitor WBC and vital signs

## 2022-06-20 NOTE — CASE MANAGEMENT
Case Management Assessment    Patient name Oscar Hodges  Location /886-54 MRN [id number]  : 1970 Date 2022       Current Admission Date: 2022  Current Admission Diagnosis:Sepsis without septic shock [facility])   Patient Active Problem List    Diagnosis Date Noted    Hypokalemia 2022    Intermittent self-catheterization of bladder 2022    Pressure injury of left ankle, stage 3 ([facility] ) 2021    Edema of both legs 2021    Acute respiratory failure with hypoxia ([facility] ) 10/12/2021    Pneumonia due to COVID-19 virus 10/11/2021    Paraplegia ([facility] ) 2020    [facility] 2020    Multifocal pneumonia 2020    Sepsis without septic shock ([facility] ) 2020      LOS (days): 1  Geometric Mean LOS (GMLOS) (days):   Days to GMLOS:     OBJECTIVE:    Risk of Unplanned Readmission Score: 9 16         Current admission status: Inpatient       Preferred Pharmacy:   PATIENT/FAMILY REPORTS NO PREFERRED PHARMACY  No address on file      Primary Care Provider: Lizzie Hui PA-C    Primary Insurance: WORKERS COMPENSATION  Secondary Insurance: Finn Mike [facility] REP    ASSESSMENT:  [address], [address] [facility] Representative - Friend   Primary Phone: [phone] (Mobile)               Advance Directives  Does patient have a [address]?: No  Was patient offered paperwork?: Yes (declined)  Does patient currently have a Health Care decision maker?: Yes, please see Health Care Proxy section  Does patient have Advance Directives?: No  Was patient offered paperwork?: Yes (declined)  Primary Contact: sanna barclay-friend         Readmission Root Cause  30 Day Readmission: No    Patient Information  Admitted from[de-identified] Home  Mental Status: Alert  During Assessment patient was accompanied by: Not accompanied during assessment  Assessment information provided by[de-identified] Patient  Primary Caregiver: Self  Support Systems: [address] [facility] of Residence: [address] [place] [address] Avenue do you live in?: [facility] entry access options  Select all that apply : Ramp  Type of Current Residence: [place]  In the last 12 months, was there a time when you were not able to pay the mortgage or rent on time?: No  In the last 12 months, how many places have you lived?: 1  In the last 12 months, was there a time when you did not have a steady place to sleep or slept in a shelter (including now)?: No  Homeless/housing insecurity resource given?: N/A  Living Arrangements: Lives Alone  Is patient a ?: No    Activities of Daily Living Prior to Admission  Functional Status: Independent  Completes ADLs independently?: Yes  Ambulates independently?: No  Level of ambulatory dependence:  (pt does not ambulate, transfer to electric sit to stand w/c)  Does patient use assisted devices?: Yes  Assisted Devices (DME) used: Wheelchair, Electric wheelchair, Shower Chair  Does patient currently own DME?: Yes  What DME does the patient currently own?: Electric Wheelchair, Wheelchair, Shower Chair  Does patient have a history of Outpatient Therapy (PT/OT)?: No  Does the patient have a history of Short-Term Rehab?: Yes ([facility] multiple times)  Does patient have a history of HHC?: No  Does patient currently have [facility]?: No (patient had aides in the past, once he got his electric w/c they no longer paid for aides   he is indepedent with all cares)         Patient Information Continued  Does patient have prescription coverage?: Yes  Within the past 12 months, you worried that your food would run out before you got the money to buy more : Never true  Within the past 12 months, the food you bought just didn't last and you didn't have money to get more : Never true  Food insecurity resource given?: N/A  Does patient receive dialysis treatments?: No  Does patient have a history of substance abuse?: No  Does patient have a history of Mental Health Diagnosis?: No         Means of Transportation  Means of Transport to [facility][de-identified] Drives Self  In the past 12 months, has lack of transportation kept you from medical appointments or from getting medications?: No  In the past 12 months, has lack of transportation kept you from meetings, work, or from getting things needed for daily living?: No  Was application for public transport provided?: N/A  Cm met with the patient to evaluate the patients prior function and living situation and any barriers to d/c and form a safe d/c plan  Cm also evaluated the patient for any services in the home or needs for services  CM also discussed with patient that their preferences will be taken into account/consideration  Pt admitted with sepsis, possible infection to left malleolus wound  Podiatry consult, MRI to r/o osteo  Pt has paraplegia but is independent with all cares  No discharge needs noted at present time  CM to follow

## 2022-06-20 NOTE — PHYSICAL THERAPY NOTE
Physical Therapy Evaluation    Patient Name: Carole Kirk    IELQI'H Date: 6/20/2022     Problem List  Principal Problem:    Sepsis without septic shock [facility])  Active Problems:    Paraplegia (HCC)    Pressure injury of left ankle, stage 3 (HCC)    Hypokalemia    Intermittent self-catheterization of bladder       Past Medical History  Past Medical History:   Diagnosis Date    Back injuries     Neurogenic bladder     Paraplegia [facility])     Renal disorder         Past Surgical History  Past Surgical History:   Procedure Laterality Date    BACK SURGERY      NEPHRECTOMY             06/20/22 0901   PT Last Visit   PT Visit Date 06/20/22   Note Type   Note type Evaluation   Pain Assessment   Pain Assessment Tool 0-10   Pain Score No Pain   Restrictions/Precautions   Weight Bearing Precautions Per Order No   Other Precautions Multiple lines; Fall Risk   Home Living   Type of [address] [facility] ADLs on one level; Able to live on main level with bedroom/bathroom; Ramped entrance   Bathroom Shower/Tub Tub/shower unit   [employer] Raised   [employer] Grab bars in shower; Shower chair   [address] Wheelchair-electric   Additional Comments pt utilizes electric w/c for all mobility   Prior Function   Level of [place] Independent with ADLs and functional mobility   Lives With Alone   ADL Assistance Independent   IADLs Independent   Falls in the last 6 months 0   Vocational Workers comp   Comments (+) driving; [employer] equipped with hand controls   General   Family/Caregiver Present No   Cognition   Overall Cognitive Status WFL   Arousal/Participation Alert   Orientation Level Oriented X4   Following Commands Follows all commands and directions without difficulty   Comments pt with significant distress during session over  outside not wearing his mask correctly   Subjective   Subjective "I've been doing this for 18 years"   RLE Assessment   RLE Assessment X  (LE grossly flaccid d/t T12 SCI)   LLE Assessment   LLE Assessment X  (LE grossly flaccid d/t T12 SCI)   Vision-Basic Assessment   Current Vision Wears glasses all the time   Bed Mobility   Supine to Sit 6  Modified independent   Sit to Supine   (pt OOB at end of session)   Transfers   Sit pivot 6  Modified independent   Additional items Increased time required   Additional Comments no device used   Ambulation/Elevation   Gait pattern Not tested; Not appropriate   Wheelchair Activities   Wheelchair Parts Management No   Propulsion Yes   Propulsion Type 1 Power   Level 1 Level tile   Balance   Static Sitting Good   Dynamic Sitting Good   Endurance Deficit   Endurance Deficit No   Activity Tolerance   Activity Tolerance Patient tolerated treatment well   Assessment   Prognosis Good   Assessment Patient is a 49 y o  male evaluated by Physical Therapy s/p admit to [address] [place] [address],[address] on 6/18/2022 with admitting diagnosis of: Paraplegia, Myositis, Fever, Left leg cellulitis, Pressure ulcer, Sepsis, and principal problem of: Sepsis without septic shock  PT was consulted to assess patient's functional mobility and discharge needs  Ordered are PT Evaluation and treatment with activity level of: up with assistance  Comorbidities affecting patient's physical performance at time of assessment include: paraplegia r/t T11/T12 spinal cord injury, neurogenic bladder, renal disorder  Please locate objective findings from PT assessment regarding body systems outlined above  Upon evaluation, pt able to perform all functional mobility mod(I) with increased time  Pt sustained a T12 SCI ~18 years ago and has a good understanding of his capabilities  When presented with manual w/c at bedside, pt able to perform all bed mobility and transfers without assist  Pt also completed full body dressing and catheter management independently   Once in manual w/c, pt able to adjust LE supports as needed and bring himself into standing posture to relieve sacral pressure  Pt has no concerns about his mobility at this time and is motivated to d/c home as soon as possible  Continued PT intervention not indicated at this time; will d/c PT orders  The patient's AM-PAC Basic Mobility Inpatient Short Form Raw Score is 16  A Raw score of less than or equal to 16 suggests the patient may benefit from discharge to post-acute rehabilitation services  Please also refer to the recommendation of the Physical Therapist for safe discharge planning  In this pt's case, AM-PAC d/c recommendation is not accurate d/t pt's baseline LOF  Pt appropriate to d/c home independently with no rehabilitation needs     Goals   Patient Goals "to get this MRI and go home"   Plan   PT Frequency Other (Comment)  (one time visit)   Recommendation   PT Discharge Recommendation No rehabilitation needs   AM-PAC Basic Mobility Inpatient   Turning in Bed Without Bedrails 4   Lying on Back to Sitting on Edge of Flat Bed 4   Moving Bed to Chair 4   Standing Up From Chair 2   Walk in Room 1   Climb 3-5 Stairs 1   Basic Mobility Inpatient Raw Score 16   Basic Mobility Standardized Score 38 32   Highest Level Of Mobility   JH-HLM Goal 5: Stand one or more mins   JH-HLM Achieved 4: Move to chair/commode   End of Consult   Patient Position at End of Consult Other (comment)  (seated in power w/c)

## 2022-06-21 VITALS
RESPIRATION RATE: 16 BRPM | DIASTOLIC BLOOD PRESSURE: 98 MMHG | WEIGHT: 192.9 LBS | HEIGHT: 71 IN | BODY MASS INDEX: 27.01 KG/M2 | SYSTOLIC BLOOD PRESSURE: 144 MMHG | HEART RATE: 77 BPM | OXYGEN SATURATION: 96 % | TEMPERATURE: 97.6 F

## 2022-06-21 LAB
ANION GAP SERPL CALCULATED.3IONS-SCNC: 8 MMOL/L (ref 4–13)
BACTERIA WND AEROBE CULT: ABNORMAL
BASOPHILS # BLD AUTO: 0.05 THOUSANDS/ΜL (ref 0–0.1)
BASOPHILS NFR BLD AUTO: 1 % (ref 0–1)
BUN SERPL-MCNC: 11 MG/DL (ref 5–25)
CALCIUM SERPL-MCNC: 8.6 MG/DL (ref 8.3–10.1)
CHLORIDE SERPL-SCNC: 105 MMOL/L (ref 100–108)
CO2 SERPL-SCNC: 28 MMOL/L (ref 21–32)
CREAT SERPL-MCNC: 1.04 MG/DL (ref 0.6–1.3)
EOSINOPHIL # BLD AUTO: 0.25 THOUSAND/ΜL (ref 0–0.61)
EOSINOPHIL NFR BLD AUTO: 3 % (ref 0–6)
ERYTHROCYTE [DISTWIDTH] IN BLOOD BY AUTOMATED COUNT: 13.2 % (ref 11.6–15.1)
GFR SERPL CREATININE-BSD FRML MDRD: 83 ML/MIN/1.73SQ M
GLUCOSE SERPL-MCNC: 92 MG/DL (ref 65–140)
GRAM STN SPEC: ABNORMAL
GRAM STN SPEC: ABNORMAL
HCT VFR BLD AUTO: 39.7 % (ref 36.5–49.3)
HGB BLD-MCNC: 12.6 G/DL (ref 12–17)
IMM GRANULOCYTES # BLD AUTO: 0.03 THOUSAND/UL (ref 0–0.2)
IMM GRANULOCYTES NFR BLD AUTO: 0 % (ref 0–2)
LYMPHOCYTES # BLD AUTO: 1.32 THOUSANDS/ΜL (ref 0.6–4.47)
LYMPHOCYTES NFR BLD AUTO: 18 % (ref 14–44)
MCH RBC QN AUTO: 27.4 PG (ref 26.8–34.3)
MCHC RBC AUTO-ENTMCNC: 31.7 G/DL (ref 31.4–37.4)
MCV RBC AUTO: 86 FL (ref 82–98)
MONOCYTES # BLD AUTO: 0.5 THOUSAND/ΜL (ref 0.17–1.22)
MONOCYTES NFR BLD AUTO: 7 % (ref 4–12)
NEUTROPHILS # BLD AUTO: 5.21 THOUSANDS/ΜL (ref 1.85–7.62)
NEUTS SEG NFR BLD AUTO: 71 % (ref 43–75)
NRBC BLD AUTO-RTO: 0 /100 WBCS
PLATELET # BLD AUTO: 216 THOUSANDS/UL (ref 149–390)
PMV BLD AUTO: 10.3 FL (ref 8.9–12.7)
POTASSIUM SERPL-SCNC: 4.1 MMOL/L (ref 3.5–5.3)
RBC # BLD AUTO: 4.6 MILLION/UL (ref 3.88–5.62)
SODIUM SERPL-SCNC: 141 MMOL/L (ref 136–145)
WBC # BLD AUTO: 7.36 THOUSAND/UL (ref 4.31–10.16)

## 2022-06-21 PROCEDURE — 80048 BASIC METABOLIC PNL TOTAL CA: CPT | Performed by: FAMILY MEDICINE

## 2022-06-21 PROCEDURE — 99239 HOSP IP/OBS DSCHRG MGMT >30: CPT | Performed by: FAMILY MEDICINE

## 2022-06-21 PROCEDURE — 85025 COMPLETE CBC W/AUTO DIFF WBC: CPT | Performed by: FAMILY MEDICINE

## 2022-06-21 RX ORDER — DOXYCYCLINE HYCLATE 100 MG/1
100 CAPSULE ORAL EVERY 12 HOURS SCHEDULED
Status: DISCONTINUED | OUTPATIENT
Start: 2022-06-21 | End: 2022-06-21

## 2022-06-21 RX ORDER — DOXYCYCLINE HYCLATE 100 MG/1
100 CAPSULE ORAL EVERY 12 HOURS SCHEDULED
Qty: 14 CAPSULE | Refills: 0 | Status: SHIPPED | OUTPATIENT
Start: 2022-06-21 | End: 2022-06-28

## 2022-06-21 RX ORDER — DOXYCYCLINE HYCLATE 100 MG/1
100 CAPSULE ORAL EVERY 12 HOURS SCHEDULED
Status: DISCONTINUED | OUTPATIENT
Start: 2022-06-21 | End: 2022-06-21 | Stop reason: HOSPADM

## 2022-06-21 RX ADMIN — DOXYCYCLINE HYCLATE 100 MG: 100 CAPSULE ORAL at 11:33

## 2022-06-21 RX ADMIN — Medication 2000 UNITS: at 08:33

## 2022-06-21 RX ADMIN — PIPERACILLIN AND TAZOBACTAM 3.38 G: 3; .375 INJECTION, POWDER, LYOPHILIZED, FOR SOLUTION INTRAVENOUS at 03:53

## 2022-06-21 RX ADMIN — DOCUSATE SODIUM 100 MG: 100 CAPSULE, LIQUID FILLED ORAL at 08:33

## 2022-06-21 RX ADMIN — PIPERACILLIN AND TAZOBACTAM 3.38 G: 3; .375 INJECTION, POWDER, LYOPHILIZED, FOR SOLUTION INTRAVENOUS at 11:03

## 2022-06-21 RX ADMIN — GABAPENTIN 800 MG: 400 CAPSULE ORAL at 08:33

## 2022-06-21 NOTE — ASSESSMENT & PLAN NOTE
· Patient reported temperature 101° F at home ,temperature maximum in the emergency room 38 2C,  heart rate 119< WBC 14 7 , leukocytosis trending down since December ,lactic acid 1  · CT of left lower extremity did not reveal yesika gas collection but patient reports his chronic left malleolus wound getting worse and he noted erythema to the left lower extremity so will treat sepsis secondary to most likely cellulitis - per report: "There is a 2 3 x 1 1 x 3 cm focal area of low-attenuation within the soleus muscle in the lower leg, seen in image 242 series 2, image 101 series 306   This may be due to evolving area of for myonecrosis or evolving fluid collection   Consider MRI with contrast"   · MRI reviewed, fortunately no evidence of collection identified  · Stable for discharge  · Discharge on course of Doxycycline

## 2022-06-21 NOTE — CASE MANAGEMENT
Case Management Discharge Planning Note    Patient name Caitie Torres  Location /274-89 MRN [id number]  : 1970 Date 2022       Current Admission Date: 2022  Current Admission Diagnosis:Sepsis without septic shock [facility])   Patient Active Problem List    Diagnosis Date Noted    Hypokalemia 2022    Intermittent self-catheterization of bladder 2022    Pressure injury of left ankle, stage 3 ([facility] ) 2021    Edema of both legs 2021    Acute respiratory failure with hypoxia ([facility] ) 10/12/2021    Pneumonia due to COVID-19 virus 10/11/2021    Paraplegia ([facility] ) 2020    Atrial fibrillation with RVR ([facility] ) 2020    Multifocal pneumonia 2020    Sepsis without septic shock ([facility] ) 2020      LOS (days): 2  Geometric Mean LOS (GMLOS) (days):   Days to GMLOS:     OBJECTIVE:  Risk of Unplanned Readmission Score: 8 4         Current admission status: Inpatient   Preferred Pharmacy:   PATIENT/FAMILY REPORTS NO PREFERRED PHARMACY  No address on file      Primary Care Provider: Danielito Orona PA-C    Primary Insurance: WORKERS COMPENSATION  Secondary Insurance: [employer] DETAILS:Pt is being dc'd home on this date with OP follow up

## 2022-06-21 NOTE — ASSESSMENT & PLAN NOTE
Secondary to T12 fracture  Self catheterization due to neurogenic blood - discontinue Grimm prior to discharge

## 2022-06-21 NOTE — DISCHARGE SUMMARY
[address] [facility] [address]  Discharge- Lynn Breen 1970, 49 y o  male MRN: [id number]  Unit/Bed#: 133-23 Encounter: [id number]  Primary Care Provider: Messi Miller PA-C   Date and time admitted to hospital: 6/18/2022  9:01 PM    * Sepsis without septic shock [facility])  Assessment & Plan  · Patient reported temperature 101° F at home ,temperature maximum in the emergency room 38 2C,  heart rate 119< WBC 14 7 , leukocytosis trending down since December ,lactic acid 1  · CT of left lower extremity did not reveal yesika gas collection but patient reports his chronic left malleolus wound getting worse and he noted erythema to the left lower extremity so will treat sepsis secondary to most likely cellulitis - per report: "There is a 2 3 x 1 1 x 3 cm focal area of low-attenuation within the soleus muscle in the lower leg, seen in image 242 series 2, image 101 series 306   This may be due to evolving area of for myonecrosis or evolving fluid collection   Consider MRI with contrast"   · MRI reviewed, fortunately no evidence of collection identified  · Stable for discharge  · Discharge on course of Doxycycline       Intermittent self-catheterization of bladder  Assessment & Plan  Urinalysis showed mild pyuria      Hypokalemia  Assessment & Plan  Resolved with replacement     Pressure injury of left ankle, stage 3 ([address] )  Assessment & Plan  Patient follow-up with wound clinic as outpatient  Management as above    Paraplegia [facility])  Assessment & Plan  Secondary to T12 fracture  Self catheterization due to neurogenic blood - discontinue Grimm prior to discharge         Medical Problems             Resolved Problems  Date Reviewed: 6/21/2022   None               Discharging Physician / Practitioner: Aleksandra Gutierrez MD  PCP: Messi Miller PA-C  Admission Date:   Admission Orders (From admission, onward)     Ordered        06/19/22 0318  INPATIENT ADMISSION  Once                      Discharge Date: 06/21/22    Consultations During Hospital Stay:  · Podiatry    Procedures Performed:   VAS lower limb venous duplex study, complete bilateral   Final Result by Chon Alba MD (06/20 2225)      MRI tibia fibula left w wo contrast   Final Result by Emeli Velasquez DO (06/20 1103)      Diffuse subcutaneous edema which may represent cellulitis or venous stasis  No focal rim-enhancing fluid collection to suggest the presence of abscess  No intramuscular fluid collection to suggest abscess  Focal mild edema distal lateral soleus may represent mild focal myositis or low-grade muscle strain  No evidence of acute osteomyelitis  Workstation performed: [id number]         CT lower extremity w contrast left   ED Interpretation by Westley Olszewski, MD (06/19 0791)   [facility] Dr Maribel Baig:  No acute osseous abnormalities small shallow subcutaneous ulcer medial aspect the left ankle with diffuse subcutaneous cellulitis incidentally noted diffuse edema of the anterior compartment musculature of the right calf with coarse calcifications which is nonspecific there is general subcu edema of the right      Final Result by Catrachito Nair MD (06/19 1122)      Subcutaneous ulceration of the medial aspect of the ankle joint with associated the superficial cellulitic changes in the leg      There is a 2 3 x 1 1 x 3 cm focal area of low-attenuation within the soleus muscle in the lower leg, seen in image 242 series 2, image 101 series 306  This may be due to evolving area of for myonecrosis or evolving fluid collection  Consider MRI with    contrast      No collection with air-fluid level seen      The major findings are in agreement with the preliminary report provided by [facility] which was provided shortly after completion of the exam  The additional finding of  low-attenuation area within the soleus muscle is not described      The study was marked in EPIC for immediate notification           Workstation performed: [id number]         CT abdomen pelvis w contrast   ED Interpretation by Brittany Estrada MD (06/19 3312)   [facility] Dr Ramon Post:  Diffuse ground-glass opacities at the periphery of visualized right lung and left bases compatible with infectious or inflammatory infiltrates particularly COVID-19 pneumonia no acute findings in the abdomen or pelvis right kidney is absent      Final Result by Merry Saleh MD (06/19 3935)      Heterogeneity noted within the prostate gland involving the the right gland extending from the base to the apex and in the left mid gland, Not seen on the previous study of December 11, 2021  Suggest evaluation with the nonemergent MRI to exclude    prostate fluid collection       No intra-abdominal fluid collection      Bibasilar densities, more pronounced from the previous study of the December 11, 2021, May represent dependent atelectasis or chronic interstitial lung disease  Consider follow-up chest CT when the acute illness subsides  Follow-up chest CT can be    performed in prone position  7 mm nodular density at the right lung base, image 6 series 2 and also reassess at the time of prone CT chest      No bowel obstruction seen      Initial report provided by [facility] service    The study was marked in [facility] for immediate notification  Workstation performed: [id number]         XR ankle 2 views LEFT   ED Interpretation by Brittany Estrada MD (06/18 8048)   Read by me; Radiologist to provide formal interpretation  Medial soft tissue defect to ankle c/w open wound ? Gas proximally and inferior to medial malleolus        Final Result by Arlin Garcia DO (06/19 1122)   Cellulitis with ulceration overlying the medial malleolus  No evidence of subcutaneous emphysema  No acute osseous abnormality  Correlate with the follow-up CT of the left lower extremity              If there is continued concern for necrotizing fasciitis, consider follow-up orthopedic surgical evaluation  Workstation performed: [id number]         XR tibia fibula 2 views LEFT   ED Interpretation by Arturo Roy MD (06/18 2306)   Read by me; Radiologist to provide formal interpretation  No acute fracture or air in soft tissues  Final Result by Consuelo Welsh DO (06/19 1110)   Soft tissue swelling, most compatible with cellulitis  No acute osseous abnormality  Correlate with the follow-up CT scan of the left lower extremity  If there is continued concern for necrotizing fasciitis, recommend follow-up orthopedic surgical evaluation  Workstation performed: [id number]         XR chest 1 view portable   ED Interpretation by Arturo Roy MD (06/18 2302)   Read by me; Radiologist to provide formal interpretation  No acute process      Final Result by Consuelo Welsh DO (06/19 1124)   No active pulmonary disease on examination which is somewhat limited secondary to low lung volumes  Workstation performed: [id number]               Significant Findings / Test Results:   Results from last 7 days   Lab Units 06/21/22  0602   WBC Thousand/uL 7 36   HEMOGLOBIN g/dL 12 6   HEMATOCRIT % 39 7   PLATELETS Thousands/uL 216     Results from last 7 days   Lab Units 06/21/22  0602   SODIUM mmol/L 141   POTASSIUM mmol/L 4 1   CHLORIDE mmol/L 105   CO2 mmol/L 28   BUN mg/dL 11   CREATININE mg/dL 1 04   CALCIUM mg/dL 8 6       Incidental Findings:   · None     Test Results Pending at Discharge (will require follow up):    · None      Outpatient Tests Requested:  Results from last 7 days   Lab Units 06/21/22  0602   WBC Thousand/uL 7 36   HEMOGLOBIN g/dL 12 6   HEMATOCRIT % 39 7   PLATELETS Thousands/uL 216     Results from last 7 days   Lab Units 06/21/22  0602   SODIUM mmol/L 141   POTASSIUM mmol/L 4 1   CHLORIDE mmol/L 105   CO2 mmol/L 28   BUN mg/dL 11   CREATININE mg/dL 1 04   CALCIUM mg/dL 8 6       Complications:  None    Reason for Admission: left ankle wound with cellulitis    Hospital Course:   Elias Yu is a 49 y o  male patient who originally presented to the hospital on 6/18/2022 due to left ankle wound with cellulitis  Met sepsis criteria on admission  Underwent IV antibiotics with subsequent clinical improvement  CT left lower extremity with abnormal findings fortunately not redemonstrated on MRI  Was evaluated by Podiatry  Discharged in improved and stable condition on a course of doxycycline  Follow-up with PCP and Wound Care  Please see above list of diagnoses and related plan for additional information  Condition at Discharge: stable    Discharge Day Visit / Exam:     Subjective:  Patient seen and examined  He reports feeling overall well and would like to be discharged home  Afebrile, no overnight events  Vitals: Blood Pressure: 144/98 (06/21/22 0716)  Pulse: 77 (06/21/22 0716)  Temperature: 97 6 °F (36 4 °C) (06/21/22 0716)  Temp Source: Oral (06/20/22 0748)  Respirations: 16 (06/21/22 0716)  Height: 5' 11" (180 3 cm) (06/19/22 0413)  Weight - Scale: 87 5 kg (192 lb 14 4 oz) (06/19/22 0413)  SpO2: 96 % (06/21/22 0716)    Exam:     Physical Exam  Constitutional:       General: He is not in acute distress  HENT:      Head: Normocephalic and atraumatic  Nose: No congestion  Eyes:      Conjunctiva/sclera: Conjunctivae normal    Cardiovascular:      Rate and Rhythm: Normal rate and regular rhythm  Heart sounds: No murmur heard  Pulmonary:      Effort: No respiratory distress  Breath sounds: No wheezing or rales  Abdominal:      General: There is no distension  Tenderness: There is no abdominal tenderness  There is no guarding  Musculoskeletal:      Comments: Left ankle wound, serosanguinous discharge    Neurological:      Mental Status: He is oriented to person, place, and time     Psychiatric:         Mood and Affect: Mood normal          Discharge instructions/Information to patient and family:   See after visit summary for information provided to patient and family  Provisions for Follow-Up Care:  See after visit summary for information related to follow-up care and any pertinent home health orders  Disposition:   Home    Planned Readmission: No     Discharge Statement:  I spent 35 minutes discharging the patient  This time was spent on the day of discharge  I had direct contact with the patient on the day of discharge  Greater than 50% of the total time was spent examining patient, answering all patient questions, arranging and discussing plan of care with patient as well as directly providing post-discharge instructions  Additional time then spent on discharge activities  Discharge Medications:  See after visit summary for reconciled discharge medications provided to patient and/or family        **Please Note: This note may have been constructed using a voice recognition system**

## 2022-06-22 LAB — MRSA NOSE QL CULT: NORMAL

## 2022-06-24 LAB
BACTERIA BLD CULT: NORMAL
BACTERIA BLD CULT: NORMAL

## 2022-06-27 ENCOUNTER — OFFICE VISIT (OUTPATIENT)
Dept: WOUND CARE | Facility: CLINIC | Age: 52
End: 2022-06-27
Payer: COMMERCIAL

## 2022-06-27 VITALS
HEART RATE: 69 BPM | RESPIRATION RATE: 18 BRPM | SYSTOLIC BLOOD PRESSURE: 162 MMHG | DIASTOLIC BLOOD PRESSURE: 107 MMHG | TEMPERATURE: 97.4 F

## 2022-06-27 DIAGNOSIS — L89.523 PRESSURE INJURY OF LEFT ANKLE, STAGE 3 (HCC): Primary | ICD-10-CM

## 2022-06-27 PROCEDURE — 99213 OFFICE O/P EST LOW 20 MIN: CPT | Performed by: FAMILY MEDICINE

## 2022-06-27 PROCEDURE — 97597 DBRDMT OPN WND 1ST 20 CM/<: CPT | Performed by: FAMILY MEDICINE

## 2022-06-27 PROCEDURE — 97597 DBRDMT OPN WND 1ST 20 CM/<: CPT | Performed by: STUDENT IN AN ORGANIZED HEALTH CARE EDUCATION/TRAINING PROGRAM

## 2022-06-27 NOTE — PATIENT INSTRUCTIONS
Orders Placed This Encounter   Procedures    Wound cleansing and dressings     Left ankle wound:    Wash your hands with soap and water  Remove old dressing, discard into plastic bag and place in trash  Cleanse the left ankle ulcer with soap (unscented Dove) and water prior to applying a clean dressing  Do not use tissue or cotton balls  Do not scrub the wound  Pat dry using gauze  Shower yes, keep dressing clean and dry  If dressing gets wet change dressing as soon as possible         Apply Bactroban (mupirocin) gel to the left ankle ulcer  Cover with Allevyn LIFE silicone bordered foam  Change dressing DAILY and as needed for excessive drainage or leakage  May apply ace wrap from base of toes to below knee at home  OFFLOADING:  Off-loading Instructions:    Keep weight and pressure off wound at all times  and Other -Elastogel to periwound      Elastic Tubular Stocking       Tubular elastic bandage: Apply from base of toes to behind the knee  Apply in AM, may remove for sleep  Avoid prolonged standing in one place  Elevate leg(s) above the level of the heart when sitting or as much as possible  Follow up at [address] [facility] in THREE weeks          Standing Status:   Future     Standing Expiration Date:   6/27/2023

## 2022-06-27 NOTE — PROGRESS NOTES
Patient ID: Court Lundberg is a 49 y o  male Date of Birth 1970       Chief Complaint   Patient presents with    Follow Up Wound Care Visit     Left ankle wound       Allergies:  Codeine, Dilaudid [hydromorphone], and Morphine and related    Diagnosis:   Diagnosis ICD-10-CM Associated Orders   1  Pressure injury of left ankle, stage 3 ([facility])  L89 523 Wound cleansing and dressings     mupirocin (BACTROBAN) 2 % ointment     Debridement        Assessment  & Plan:     F/u pressure injury of L ankle stage 3  Healing remains stalled  Indurated ring around periwound remains  Infection appears resolved   Pt recently went to ED d/t cellulitis and sepsis with source ruled likely from the wound  MRSA + was treated with course of doxycycline  Wound does not appear acutely infected on examination    MRI L tibia fibula taken at on 06/20/22: See results below    CT LLE taken on 06/19/22  See results below  No osteomyelitis   Discussed risk versus benefits of biopsy of indurated surrounding periwound  Pt decided to defer for this week  We discussed if wound deteriorates in the future it would be ideal to take bx to r/o CA or other underlying conditions that may be contributing to wound nonhealing   Continue with Dermawound  (Pt request)   Selective debridement performed   Pt is going to attempt to offload his leg  States he sleeps on L side and pressure is remaining on L leg while sleeping   F/u in three weeks           CT LLE 06/19/22;      SOFT TISSUES:  Ulceration noted at the medial aspect of the ankle joint with associated the substances infiltration  There are superficial cellulitic changes in the leg with the infiltration subcutaneous fat, skin thickening      OTHER PERTINENT FINDINGS:  None      IMPRESSION:     Subcutaneous ulceration of the medial aspect of the ankle joint with associated the superficial cellulitic changes in the leg     There is a 2 3 x 1 1 x 3 cm focal area of low-attenuation within the soleus muscle in the lower leg, seen in image 242 series 2, image 101 series 306  This may be due to evolving area of for myonecrosis or evolving fluid collection  Consider MRI with   contrast     No collection with air-fluid level seen     The major findings are in agreement with the preliminary report provided by [facility] which was provided shortly after completion of the exam  The additional finding of  low-attenuation area within the soleus muscle is not described     The study was marked in EPIC for immediate notification      MRI L tibia fibula L 06/20/22:   Diffuse subcutaneous edema which may represent cellulitis or venous stasis  No focal rim-enhancing fluid collection to suggest the presence of abscess      No intramuscular fluid collection to suggest abscess  Focal mild edema distal lateral soleus may represent mild focal myositis or low-grade muscle strain      No evidence of acute osteomyelitis  06/27/22 Venous duplex:  LEFT LOWER LIMB:  No evidence of acute or chronic deep vein thrombosis  No evidence of superficial thrombophlebitis noted  Doppler evaluation shows a normal response to augmentation maneuvers  Popliteal, posterior tibial and anterior tibial arterial Doppler waveforms are  triphasic  Subjective:   9/27/21 Pt is a 49 y o  paraplegic M who presents for initial evaluation of wound on medial aspect of LLE and wound on Rt second toe and abrasion on lateral aspect of RLE  Pt states LLE wound has been reoccurring since 2014  Pt was seen 2 years ago at [facility] wound center but since then has been adamant on not returning  Pt has been applying Dermawound (betadine based) to wound and covering it dry dressing  Pt denies any drainage, fever or chills  10/4/21:  Patient returns regarding his pressure injury of the left medial malleolus  He continues using his Dermawound cream   He had been treated with two courses of antibiotics prior to coming here    One was doxycycline and the other clindamycin  He states that really had no effect  Other spots on the legs have stopped draining  11/1/21:  Patient returns after hospitalization for COVID-19  He states that he is doing well now but had a rough time in the hospital   He complains of both legs being swollen the right worse than the left  He received a Prevelon type boot to offload his left ankle but it appears that it is missing velcro to tighten the straps  He states that he has been using the Dermawound ointment with the exception while hospitalized  Even though the ulcer of the left medial ankle has gotten much larger, he believes that the base has improved  He believes that this is good  In the past, he has been very resistant to any recommendations that I had from a wound care perspective  I gave him time to continue using his ointment  11/15/21: Followup pressure injury 3 of the left medial ankle  He continue using his Dermawound ointment  He has been treated recently for cellulitis and UTI  Two round of antibiotic, 1st being Levaquin and the 2nd Keflex  Cellulitis was slightly atypical with erythema of both lower extremities and increased edema  11/21/21: Followup stage III pressure injury of the left medial ankle  Continues with his DermaWound ointment  No complaints  12/13/21:  Followup stage III pressure injury of the left medial ankle  Continues with dermal Wound ointment  He feels that it is slowly improving  No other new complaints  1/3/22: Followup stage III pressure injury of the left medial ankle  Continues to use Dermawound  Notes that it is significantly improved  May be due to using thicker socks in his boots  1/24/22: Followup stage III pressure injury of the left medial malleolus  Continues with Dermawound  Patient notes improvement  No other symptoms  2/14/22: Followup stage III pressure injury of the left medial malleolus    Continues with the Dermawound  He continues to see improvement  No complaints  3/24/22: With chronic recurrent pressure ulcer in dependent, edematous leg  Patient elevates at night, but dependent in the day, will not allow wrap, but was provided with an ace wrap so that he can wrap it himself as he sees fit  3/29/22:  Patient returns regarding his stage III pressure injury of his left medial malleolus  Saw Dr Jayesh Lopez last week who debrided and started Acticoat 3  He notes since using the current dressing, the ulcer has improved significantly  He is still concerned about an area of erythema adjacent to the open ulcer that started about two days ago  He believes is increasing  He has not had any increased warmth at the site, fever or chills  4/18/22: Followup stage III pressure injury of the left medial ankle  At last visit, culture was obtained in there is no growth  Acticoat was prescribed which he continues to use  However recently, he noted deterioration and slight increased drainage  5/2/22: Followup stage III pressure injury of the left medial ankle  Alginate was sticking because of decreased drainage  He did not like that therefore he is now using black drawing salve and gauze  In addition, he thought that the rim surrounding the ulcer was edematous and had material that need to be expressed  Therefore squeezed very hard around it and expressed clear fluid  He believes that this help  However, he questions why there is persisting rim of erythema  5/16/22: Followup stage III pressure injury L medial ankle  He continues to use black drawing salve  States the darker rim around the wound is somewhat improved with the steroid ointment and has just started to reuse the steroid ointment two days ago  Denies fever or chills  No increase in pain or drainage from wound  06/06/22: Followup stage III pressure injury of L medial ankle  Has been continuing to use black drawing salve  Healing has stalled  States that he can only sleep on his R side and therefore pressure is likely still being applied to the area  Has tried boots in the past which he feels were not effective in relieving the pressure  States his diet has been poor lately and he does not get much sun because he has been in agony  No fevers, chills  06/17/22: Followup stage 3 pressure injury of L medial ankle  Since last appointment, pt went to the emergency department d/t fever and cellulitis of L ankle and was septic  CT done in hospital r/o yesika gas collection  MRI additionally showed no evidence of collection and pt was d/c on course of doxycycline  Pt states he will not use the silver alginate again, as he would like to check the wound daily d/t the most recent events  Has been using Dermawound to the wound bed  States after beginning antibiotic and using this gel the wound looks 100% better than it did when he presented to ED                     The following portions of the patient's history were reviewed and updated as appropriate:   Patient Active Problem List   Diagnosis    Atrial fibrillation with RVR ([address] )    Multifocal pneumonia    Sepsis without septic shock ([address] )    Paraplegia ([address] )    Pneumonia due to COVID-19 virus    Acute respiratory failure with hypoxia ([address] )    Pressure injury of left ankle, stage 3 (HCC)    Edema of both legs    Hypokalemia    Intermittent self-catheterization of bladder     Past Medical History:   Diagnosis Date    Back injuries     Neurogenic bladder     Paraplegia ([address] )     Renal disorder      Past Surgical History:   Procedure Laterality Date    BACK SURGERY      NEPHRECTOMY       Family History   Problem Relation Age of Onset    Cancer Brother      Social History     Socioeconomic History    Marital status: Single     Spouse name: Not on file    Number of children: Not on file    Years of education: Not on file    Highest education level: Not on file   Occupational History    Not on file   Tobacco Use    Smoking status: Never Smoker    Smokeless tobacco: Never Used   Vaping Use    Vaping Use: Never used   Substance and Sexual Activity    Alcohol use: Never    Drug use: No    Sexual activity: Not on file   Other Topics Concern    Not on file   Social History Narrative    Not on file     Social Determinants of Health     Financial Resource Strain: Not on file   Food Insecurity: No Food Insecurity    Worried About Running Out of Food in the Last Year: Never true    Anuj of Food in the Last Year: Never true   Transportation Needs: No Transportation Needs    Lack of Transportation (Medical): No    Lack of Transportation (Non-Medical): No   Physical Activity: Not on file   Stress: Not on file   Social Connections: Not on file   Intimate Partner Violence: Not on file   Housing Stability: Low Risk     Unable to Pay for Housing in the Last Year: No    Number of Places Lived in the Last Year: 1    Unstable Housing in the Last Year: No       Current Outpatient Medications:     mupirocin (BACTROBAN) 2 % ointment, Apply topically daily Apply topically to wound daily   Cover with gauze and bordered foam , Disp: 22 g, Rfl: 0    cholecalciferol (VITAMIN D3) 1,000 units tablet, Take 2 tablets (2,000 Units total) by mouth daily, Disp: 30 tablet, Rfl: 0    diazepam (VALIUM) 5 mg tablet, Take 10 mg by mouth daily at bedtime as needed for anxiety , Disp: , Rfl:     doxycycline hyclate (VIBRAMYCIN) 100 mg capsule, Take 1 capsule (100 mg total) by mouth every 12 (twelve) hours for 7 days, Disp: 14 capsule, Rfl: 0    gabapentin (NEURONTIN) 400 mg capsule, Take 400 mg by mouth 3 (three) times a day, Disp: , Rfl:     oxybutynin (DITROPAN-XL) 10 MG 24 hr tablet, Take 10 mg by mouth 2 (two) times a day, Disp: , Rfl:     TiZANidine (ZANAFLEX) 4 MG capsule, Take 4 mg by mouth daily at bedtime, Disp: , Rfl:     triamcinolone (KENALOG) 0 5 % cream, Apply topically 2 (two) times a day (Patient not taking: Reported on 2022), Disp: 30 g, Rfl: 0    Review of Systems   Constitutional: Negative for appetite change, chills, fatigue, fever and unexpected weight change  HENT: Negative for congestion, hearing loss, postnasal drip and sinus pressure  Eyes: Negative for discharge and visual disturbance  Cardiovascular: Positive for leg swelling (Right greater than left)  Endocrine: Negative  Genitourinary: Positive for difficulty urinating  Musculoskeletal: Positive for gait problem (paraplegia)  Negative for back pain  Skin: Positive for wound (LLE medial aspect of the ankle)  Negative for rash  Allergic/Immunologic: Negative  Neurological: Positive for weakness (Paraplegia) and numbness (Lower extremities)  Negative for dizziness, tremors, seizures and headaches  Hematological: Does not bruise/bleed easily  Psychiatric/Behavioral: Negative  The patient is not nervous/anxious  Objective:  BP (!) 162/107   Pulse 69   Temp (!) 97 4 °F (36 3 °C)   Resp 18   Pain Score: 0-No pain     Physical Exam  Vitals and nursing note reviewed  Constitutional:       Appearance: Normal appearance  He is well-developed and normal weight  HENT:      Head: Normocephalic and atraumatic  Eyes:      Conjunctiva/sclera: Conjunctivae normal    Cardiovascular:      Rate and Rhythm: Normal rate  Pulses:           Dorsalis pedis pulses are 2+ on the left side  Posterior tibial pulses are 2+ on the left side  Pulmonary:      Effort: Pulmonary effort is normal  No respiratory distress  Musculoskeletal:      Right lower leg: 3+ Edema present  Left lower le+ Edema present  Feet:    Feet:      Comments: Ulceration with fibrinous tissue in the base  Remains largely unchanged from previous visit  There is a  area of induration surrounding the wound  No drainage  No malodor  No lymphangitic streaking or signs of acute infection present     Skin:     General: Skin is warm and dry       Findings: Wound present  Comments:      Neurological:      Mental Status: He is alert and oriented to person, place, and time  Motor: Weakness (Paraplegia) present  Psychiatric:         Attention and Perception: Attention normal          Mood and Affect: Mood and affect normal          Behavior: Behavior is cooperative  Thought Content: Thought content normal          Cognition and Memory: Cognition normal          Wound 03/24/22 Pressure Injury Ankle Left;Medial (Active)   Wound Image   06/27/22 1428   Wound Description Pink;Yellow 06/27/22 1430   Tory-wound Assessment Pink;Purple; Induration 06/27/22 1430   Wound Length (cm) 0 5 cm 06/27/22 1430   Wound Width (cm) 0 7 cm 06/27/22 1430   Wound Depth (cm) 0 2 cm 06/27/22 1430   Wound Surface Area (cm^2) 0 35 cm^2 06/27/22 1430   Wound Volume (cm^3) 0 07 cm^3 06/27/22 1430   Calculated Wound Volume (cm^3) 0 07 cm^3 06/27/22 1430   Change in Wound Size % 84 78 06/27/22 1430   Drainage Amount Small 06/27/22 1430   Drainage Description Serous 06/27/22 1430   Non-staged Wound Description Full thickness 06/27/22 1430   Treatments Cleansed 06/06/22 1438   Patient Tolerance Tolerated well 06/06/22 1438                            Debridement   Wound 03/24/22 Pressure Injury Ankle Left;Medial    Universal Protocol:  Procedure performed by: (Assisting provider Marty Gandara PA-C)  Consent: Verbal consent obtained    Consent given by: patient  Patient understanding: patient states understanding of the procedure being performed  Patient identity confirmed: verbally with patient      Performed by: PA  Debridement type: selective  Pain control: lidocaine 4%  Post-debridement measurements  Length (cm): 0 5  Width (cm): 0 7  Depth (cm): 0 2  Percent debrided: 100%  Surface Area (cm^2): 0 35  Area debrided (cm^2): 0 35  Volume (cm^3): 0 07  Devitalized tissue debrided: fibrin  Instrument(s) utilized: curette  Bleeding: small  Hemostasis obtained with: pressure  Procedural pain (0-10): 0  Post-procedural pain: 0   Response to treatment: procedure was tolerated well           Results from last 6 Months   Lab Units 06/18/22  2231   WOUND CULTURE  4+ Growth of Methicillin Resistant Staphylococcus aureus*           Wound Instructions:  Orders Placed This Encounter   Procedures    Wound cleansing and dressings     Left ankle wound:    Wash your hands with soap and water  Remove old dressing, discard into plastic bag and place in trash  Cleanse the left ankle ulcer with soap (unscented Dove) and water prior to applying a clean dressing  Do not use tissue or cotton balls  Do not scrub the wound  Pat dry using gauze        Shower yes, keep dressing clean and dry   If dressing gets wet change dressing as soon as possible         Apply Bactroban (mupirocin) gel to the left ankle ulcer  Cover with Allevyn LIFE silicone bordered foam  Change dressing DAILY and as needed for excessive drainage or leakage  May apply ace wrap from base of toes to below knee at home  OFFLOADING:  Off-loading Instructions:    Keep weight and pressure off wound at all times  and Other -Elastogel to periwound      Elastic Tubular Stocking       Tubular elastic bandage: Apply from base of toes to behind the knee  Apply in AM, may remove for sleep        Avoid prolonged standing in one place        Elevate leg(s) above the level of the heart when sitting or as much as possible        Follow up at [address] [facility] in THREE weeks         Standing Status:   Future     Standing Expiration Date:   6/27/2023    Debridement     This order was created via procedure documentation       MD Hemant Burgess I, Stan Davis PA-C, have acted as a scribe with the above documentation    -Luisa Estevez MD, CWS have seen and evaluated the above patient and have reviewed and agree with the above documentation  Portions of the record may have been created with voice recognition software  Occasional wrong word or "sound alike" substitutions may have occurred due to the inherent limitations of voice recognition software  Read the chart carefully and recognize, using context, where substitutions have occurred

## 2022-07-25 ENCOUNTER — OFFICE VISIT (OUTPATIENT)
Dept: WOUND CARE | Facility: CLINIC | Age: 52
End: 2022-07-25
Payer: OTHER MISCELLANEOUS

## 2022-07-25 VITALS
DIASTOLIC BLOOD PRESSURE: 89 MMHG | RESPIRATION RATE: 18 BRPM | SYSTOLIC BLOOD PRESSURE: 146 MMHG | TEMPERATURE: 98 F | HEART RATE: 77 BPM

## 2022-07-25 DIAGNOSIS — L89.523 PRESSURE INJURY OF LEFT ANKLE, STAGE 3 (HCC): Primary | ICD-10-CM

## 2022-07-25 DIAGNOSIS — R60.0 EDEMA OF BOTH LEGS: ICD-10-CM

## 2022-07-25 DIAGNOSIS — G82.20 PARAPLEGIA (HCC): ICD-10-CM

## 2022-07-25 PROCEDURE — 99213 OFFICE O/P EST LOW 20 MIN: CPT | Performed by: FAMILY MEDICINE

## 2022-07-25 PROCEDURE — 87205 SMEAR GRAM STAIN: CPT | Performed by: FAMILY MEDICINE

## 2022-07-25 PROCEDURE — 87070 CULTURE OTHR SPECIMN AEROBIC: CPT | Performed by: FAMILY MEDICINE

## 2022-07-25 NOTE — PROGRESS NOTES
Patient ID: Ceci Lai is a 49 y o  male Date of Birth 1970       Chief Complaint   Patient presents with    Follow Up Wound Care Visit     Left ankle       Allergies:  Codeine, Dilaudid [hydromorphone], and Morphine and related    Diagnosis:   Diagnosis ICD-10-CM Associated Orders   1  Pressure injury of left ankle, stage 3 ([facility])  L89 523 Wound cleansing and dressings     Wound culture and Gram stain     mupirocin (BACTROBAN) 2 % ointment     Wound culture and Gram stain   2  Paraplegia ([facility])  G82 20    3  Edema of both legs  R60 0         Assessment  & Plan:     Followup pressure injury of L ankle  Appears approximately the same in size  Base is dry and fibrotic  Continues to have area of induration surrounding wound of unclear etiology  No significant surrounding erythema or lymphangitic streaking present    o Wound culture taken  Instructed if we do not call him before his next appointment he should assume there was no treatable infection  o Continue with Mupirocin daily  o Continue with bike riding to reduce peripheral edema  o Pt has decided to defer biopsy until wound "deteriorates"  Pt notes sometimes the surrounding edge will turn darker and the wound will get bigger, if this occurs again, then he will re-consider biopsy at that point  o Attempt to offload wound as best as possible    o Monitor for any changes including redness or increased swelling surrounding the wound, as well as fevers or chills     o F/u in three weeks  Instructed to call if any questions or concerns arise in meantime  Subjective:   9/27/21 Pt is a 49 y o  paraplegic M who presents for initial evaluation of wound on medial aspect of LLE and wound on Rt second toe and abrasion on lateral aspect of RLE  Pt states LLE wound has been reoccurring since 2014  Pt was seen 2 years ago at [facility] wound center but since then has been adamant on not returning    Pt has been applying Dermawound (betadine based) to wound and covering it dry dressing  Pt denies any drainage, fever or chills  10/4/21:  Patient returns regarding his pressure injury of the left medial malleolus  He continues using his Dermawound cream   He had been treated with two courses of antibiotics prior to coming here  One was doxycycline and the other clindamycin  He states that really had no effect  Other spots on the legs have stopped draining  11/1/21:  Patient returns after hospitalization for COVID-19  He states that he is doing well now but had a rough time in the hospital   He complains of both legs being swollen the right worse than the left  He received a Prevelon type boot to offload his left ankle but it appears that it is missing velcro to tighten the straps  He states that he has been using the Dermawound ointment with the exception while hospitalized  Even though the ulcer of the left medial ankle has gotten much larger, he believes that the base has improved  He believes that this is good  In the past, he has been very resistant to any recommendations that I had from a wound care perspective  I gave him time to continue using his ointment  11/15/21: Followup pressure injury 3 of the left medial ankle  He continue using his Dermawound ointment  He has been treated recently for cellulitis and UTI  Two round of antibiotic, 1st being Levaquin and the 2nd Keflex  Cellulitis was slightly atypical with erythema of both lower extremities and increased edema  11/21/21: Followup stage III pressure injury of the left medial ankle  Continues with his DermaWound ointment  No complaints  12/13/21:  Followup stage III pressure injury of the left medial ankle  Continues with dermal Wound ointment  He feels that it is slowly improving  No other new complaints  1/3/22: Followup stage III pressure injury of the left medial ankle  Continues to use Dermawound  Notes that it is significantly improved    May be due to using thicker socks in his boots  1/24/22: Followup stage III pressure injury of the left medial malleolus  Continues with Dermawound  Patient notes improvement  No other symptoms  2/14/22: Followup stage III pressure injury of the left medial malleolus  Continues with the Dermawound  He continues to see improvement  No complaints  3/24/22: With chronic recurrent pressure ulcer in dependent, edematous leg  Patient elevates at night, but dependent in the day, will not allow wrap, but was provided with an ace wrap so that he can wrap it himself as he sees fit  3/29/22:  Patient returns regarding his stage III pressure injury of his left medial malleolus  Saw Dr Rylan Harkins last week who debrided and started Acticoat 3  He notes since using the current dressing, the ulcer has improved significantly  He is still concerned about an area of erythema adjacent to the open ulcer that started about two days ago  He believes is increasing  He has not had any increased warmth at the site, fever or chills  4/18/22: Followup stage III pressure injury of the left medial ankle  At last visit, culture was obtained in there is no growth  Acticoat was prescribed which he continues to use  However recently, he noted deterioration and slight increased drainage  5/2/22: Followup stage III pressure injury of the left medial ankle  Alginate was sticking because of decreased drainage  He did not like that therefore he is now using black drawing salve and gauze  In addition, he thought that the rim surrounding the ulcer was edematous and had material that need to be expressed  Therefore squeezed very hard around it and expressed clear fluid  He believes that this help  However, he questions why there is persisting rim of erythema  5/16/22: Followup stage III pressure injury L medial ankle  He continues to use black drawing salve   States the darker rim around the wound is somewhat improved with the steroid ointment and has just started to reuse the steroid ointment two days ago  Denies fever or chills  No increase in pain or drainage from wound  06/06/22: Followup stage III pressure injury of L medial ankle  Has been continuing to use black drawing salve  Healing has stalled  States that he can only sleep on his R side and therefore pressure is likely still being applied to the area  Has tried boots in the past which he feels were not effective in relieving the pressure  States his diet has been poor lately and he does not get much sun because he has been in agony  No fevers, chills  06/17/22: Followup stage 3 pressure injury of L medial ankle  Since last appointment, pt went to the emergency department d/t fever and cellulitis of L ankle and was septic  CT done in hospital r/o yesika gas collection  MRI additionally showed no evidence of collection and pt was d/c on course of doxycycline  Pt states he will not use the silver alginate again, as he would like to check the wound daily d/t the most recent events  Has been using Dermawound to the wound bed  States after beginning antibiotic and using this gel the wound looks 100% better than it did when he presented to ED        07/25/22: Followup stage 3 pressure injury of L medial ankle  Pt has been using Mupirocin since last visit  Wound looks  Pt was hospitalized mid June and notes that he was on a course of doxycylcine  Has since finished the course of doxy but is concerned that the skin is more shiny around his wound and that this may indicate an ongoing infection  Notes his edema is improving with riding his bike  No fevers or chills           The following portions of the patient's history were reviewed and updated as appropriate:   Patient Active Problem List   Diagnosis    [facility] RVR [facility]    Multifocal pneumonia    Sepsis without septic shock ([facility]    Paraplegia [facility]    Pneumonia due to COVID-19 virus    Acute respiratory failure with hypoxia (HCC)    Pressure injury of left ankle, stage 3 (HCC)    Edema of both legs    Hypokalemia    Intermittent self-catheterization of bladder     Past Medical History:   Diagnosis Date    Back injuries     Neurogenic bladder     Paraplegia ([facility]     Renal disorder      Past Surgical History:   Procedure Laterality Date    BACK SURGERY      NEPHRECTOMY       Family History   Problem Relation Age of Onset    Cancer Brother      Social History     Socioeconomic History    Marital status: Single     Spouse name: Not on file    Number of children: Not on file    Years of education: Not on file    Highest education level: Not on file   Occupational History    Not on file   Tobacco Use    Smoking status: Never Smoker    Smokeless tobacco: Never Used   Vaping Use    Vaping Use: Never used   Substance and Sexual Activity    Alcohol use: Never    Drug use: No    Sexual activity: Not on file   Other Topics Concern    Not on file   Social History Narrative    Not on file     Social Determinants of Health     Financial Resource Strain: Not on file   Food Insecurity: No Food Insecurity    Worried About Running Out of Food in the Last Year: Never true    Anuj of Food in the Last Year: Never true   Transportation Needs: No Transportation Needs    Lack of Transportation (Medical): No    Lack of Transportation (Non-Medical): No   Physical Activity: Not on file   Stress: Not on file   Social Connections: Not on file   Intimate Partner Violence: Not on file   Housing Stability: Low Risk     Unable to Pay for Housing in the Last Year: No    Number of Places Lived in the Last Year: 1    Unstable Housing in the Last Year: No       Current Outpatient Medications:     mupirocin (BACTROBAN) 2 % ointment, Apply topically daily Apply topically to wound daily   Cover with gauze and bordered foam , Disp: 22 g, Rfl: 0    cholecalciferol (VITAMIN D3) 1,000 units tablet, Take 2 tablets (2,000 Units total) by mouth daily, Disp: 30 tablet, Rfl: 0    diazepam (VALIUM) 5 mg tablet, Take 10 mg by mouth daily at bedtime as needed for anxiety , Disp: , Rfl:     gabapentin (NEURONTIN) 400 mg capsule, Take 400 mg by mouth 3 (three) times a day, Disp: , Rfl:     oxybutynin (DITROPAN-XL) 10 MG 24 hr tablet, Take 10 mg by mouth 2 (two) times a day, Disp: , Rfl:     TiZANidine (ZANAFLEX) 4 MG capsule, Take 4 mg by mouth daily at bedtime, Disp: , Rfl:     triamcinolone (KENALOG) 0 5 % cream, Apply topically 2 (two) times a day (Patient not taking: Reported on 6/19/2022), Disp: 30 g, Rfl: 0    Review of Systems   Constitutional: Negative for appetite change, chills, fatigue, fever and unexpected weight change  HENT: Negative for congestion, hearing loss, postnasal drip and sinus pressure  Eyes: Negative for discharge and visual disturbance  Cardiovascular: Positive for leg swelling (Right greater than left)  Endocrine: Negative  Genitourinary: Positive for difficulty urinating  Musculoskeletal: Positive for gait problem (paraplegia)  Negative for back pain  Skin: Positive for wound (LLE medial aspect of the ankle)  Negative for rash  Allergic/Immunologic: Negative  Neurological: Positive for weakness (Paraplegia) and numbness (Lower extremities)  Negative for dizziness, tremors, seizures and headaches  Hematological: Does not bruise/bleed easily  Psychiatric/Behavioral: Negative  The patient is not nervous/anxious  Objective:  /89   Pulse 77   Temp 98 °F (36 7 °C)   Resp 18   Pain Score: 0-No pain     Physical Exam  Vitals and nursing note reviewed  Constitutional:       Appearance: Normal appearance  He is well-developed and normal weight  HENT:      Head: Normocephalic and atraumatic  Eyes:      Conjunctiva/sclera: Conjunctivae normal    Cardiovascular:      Rate and Rhythm: Normal rate  Pulses:           Dorsalis pedis pulses are 2+ on the left side  Posterior tibial pulses are 2+ on the left side  Pulmonary:      Effort: Pulmonary effort is normal  No respiratory distress  Musculoskeletal:      Right lower leg: 3+ Edema present  Left lower le+ Edema present  Feet:    Feet:      Left foot:      Skin integrity: Ulcer present  Comments: Ulceration with fibrinous tissue in the base  Remains largely unchanged from previous visit  There is a  area of induration surrounding the wound  No significant drainage  No malodor  No lymphangitic streaking or signs of acute infection present  Skin:     General: Skin is warm and dry  Findings: Wound present  Comments:      Neurological:      Mental Status: He is alert and oriented to person, place, and time  Motor: Weakness (Paraplegia) present  Gait: Gait abnormal (paraplegia )  Psychiatric:         Attention and Perception: Attention normal          Mood and Affect: Mood and affect normal          Behavior: Behavior is cooperative  Thought Content: Thought content normal          Cognition and Memory: Cognition normal            Wound 22 Pressure Injury Ankle Left;Medial (Active)   Wound Image   22 142   Wound Description Pink;Yellow 22 142   Tory-wound Assessment Pink;Purple; Induration 22 1426   Wound Length (cm) 0 5 cm 22 1426   Wound Width (cm) 0 7 cm 22 1426   Wound Depth (cm) 0 2 cm 22 1426   Wound Surface Area (cm^2) 0 35 cm^2 22 1426   Wound Volume (cm^3) 0 07 cm^3 22 1426   Calculated Wound Volume (cm^3) 0 07 cm^3 22 1426   Change in Wound Size % 84 78 22 1426   Drainage Amount Small 22 1426   Drainage Description Serous 22 1426   Non-staged Wound Description Full thickness 22 1426   Treatments Cleansed 22 1426   Patient Tolerance Tolerated well 22 1438                          Results from last 6 Months   Lab Units 22  2231   WOUND CULTURE  4+ Growth of Methicillin Resistant Staphylococcus aureus*           Wound Instructions:  Orders Placed This Encounter   Procedures    Wound cleansing and dressings     Wound cleansing and dressings       Left ankle wound:     Wash your hands with soap and water  Remove old dressing, discard into plastic bag and place in trash  Cleanse the left ankle ulcer with soap (unscented Dove) and water prior to applying a clean dressing  Do not use tissue or cotton balls  Do not scrub the wound  Pat dry using gauze        Shower yes, keep dressing clean and dry   If dressing gets wet change dressing as soon as possible         Apply Bactroban (mupirocin) gel to the left ankle ulcer  Cover with Allevyn LIFE silicone bordered foam  Change dressing DAILY and as needed for excessive drainage or leakage  May apply ace wrap from base of toes to below knee at home       OFFLOADING:  Off-loading Instructions:     Keep weight and pressure off wound at all times  and Other -Elastogel to periwound      Elastic Tubular Stocking       Tubular elastic bandage: Apply from base of toes to behind the knee  Apply in AM, may remove for sleep        Avoid prolonged standing in one place        Elevate leg(s) above the level of the heart when sitting or as much as possible        [facility] [facility]  Standing Status:   Future     Standing Expiration Date:   7/25/2023    Wound culture and Gram stain     Standing Status:   Future     Number of Occurrences:   1     Standing Expiration Date:   7/25/2023     Order Specific Question:   Release to patient through [address] [place] [address]     Answer:   Immediate       MD Hemant Guzmán I, Sydnie Santana PA-C, have acted as a scribe with the above documentation    -Flor Trujillo MD, CWS have seen and evaluated the above patient and have reviewed and agree with the above documentation  Portions of the record may have been created with voice recognition software   Occasional wrong word or "sound alike" substitutions may have occurred due to the inherent limitations of voice recognition software  Read the chart carefully and recognize, using context, where substitutions have occurred

## 2022-07-25 NOTE — PATIENT INSTRUCTIONS
Orders Placed This Encounter   Procedures    Wound cleansing and dressings     Wound cleansing and dressings       Left ankle wound:     Wash your hands with soap and water  Remove old dressing, discard into plastic bag and place in trash  Cleanse the left ankle ulcer with soap (unscented Dove) and water prior to applying a clean dressing  Do not use tissue or cotton balls  Do not scrub the wound  Pat dry using gauze  Shower yes, keep dressing clean and dry  If dressing gets wet change dressing as soon as possible         Apply Bactroban (mupirocin) gel to the left ankle ulcer  Cover with Allevyn LIFE silicone bordered foam  Change dressing DAILY and as needed for excessive drainage or leakage  May apply ace wrap from base of toes to below knee at home  OFFLOADING:  Off-loading Instructions:     Keep weight and pressure off wound at all times  and Other -Elastogel to periwound      Elastic Tubular Stocking       Tubular elastic bandage: Apply from base of toes to behind the knee  Apply in AM, may remove for sleep  Avoid prolonged standing in one place  Elevate leg(s) above the level of the heart when sitting or as much as possible  Follow up at [address] [facility] in THREE weeks       Standing Status:   Future     Standing Expiration Date:   7/25/2023

## 2022-07-29 LAB
BACTERIA WND AEROBE CULT: NO GROWTH
GRAM STN SPEC: NORMAL

## 2022-08-15 ENCOUNTER — OFFICE VISIT (OUTPATIENT)
Dept: WOUND CARE | Facility: CLINIC | Age: 52
End: 2022-08-15
Payer: OTHER MISCELLANEOUS

## 2022-08-15 VITALS — SYSTOLIC BLOOD PRESSURE: 137 MMHG | TEMPERATURE: 98.6 F | HEART RATE: 98 BPM | DIASTOLIC BLOOD PRESSURE: 95 MMHG

## 2022-08-15 DIAGNOSIS — L89.523 PRESSURE INJURY OF LEFT ANKLE, STAGE 3 (HCC): Primary | ICD-10-CM

## 2022-08-15 DIAGNOSIS — R60.0 EDEMA OF BOTH LEGS: ICD-10-CM

## 2022-08-15 DIAGNOSIS — G82.20 PARAPLEGIA (HCC): ICD-10-CM

## 2022-08-15 PROCEDURE — 99214 OFFICE O/P EST MOD 30 MIN: CPT | Performed by: STUDENT IN AN ORGANIZED HEALTH CARE EDUCATION/TRAINING PROGRAM

## 2022-08-15 PROCEDURE — 99213 OFFICE O/P EST LOW 20 MIN: CPT | Performed by: STUDENT IN AN ORGANIZED HEALTH CARE EDUCATION/TRAINING PROGRAM

## 2022-08-15 NOTE — PATIENT INSTRUCTIONS
Orders Placed This Encounter   Procedures    Wound cleansing and dressings     Wound cleansing and dressings       Left ankle wound:       Wash your hands with soap and water  Remove old dressing, discard into plastic bag and place in trash  Cleanse the left ankle ulcer with soap (unscented Dove) and water prior to applying a clean dressing  Do not use tissue or cotton balls  Do not scrub the wound  Pat dry using gauze  Shower yes, keep dressing clean and dry  If dressing gets wet change dressing as soon as possible         Apply Bactroban (mupirocin) gel to the left ankle ulcer  Cover with Allevyn LIFE silicone bordered foam  Change dressing DAILY and as needed for excessive drainage or leakage  May apply ace wrap from base of toes to below knee at home  OFFLOADING:   Off-loading Instructions:       Keep weight and pressure off wound at all times  and Other -Elastogel to periwound       Elastic Tubular Stocking       Tubular elastic bandage: Apply from base of toes to behind the knee  Apply in AM, may remove for sleep  Avoid prolonged standing in one place  Elevate leg(s) above the level of the heart when sitting or as much as possible  Follow up at [address] [facility] in THREE weeks      Have Artrial Studies done     Standing Status:   Future     Standing Expiration Date:   8/15/2023

## 2022-09-22 ENCOUNTER — HOSPITAL ENCOUNTER (OUTPATIENT)
Dept: NON INVASIVE DIAGNOSTICS | Facility: HOSPITAL | Age: 52
Discharge: HOME | End: 2022-09-22
Payer: COMMERCIAL

## 2022-09-22 DIAGNOSIS — L89.523 PRESSURE INJURY OF LEFT ANKLE, STAGE 3 (HCC): ICD-10-CM

## 2022-09-22 PROCEDURE — 93925 LOWER EXTREMITY STUDY: CPT

## 2022-09-22 PROCEDURE — 93923 UPR/LXTR ART STDY 3+ LVLS: CPT

## 2022-09-23 PROCEDURE — 93922 UPR/L XTREMITY ART 2 LEVELS: CPT | Performed by: SURGERY

## 2022-09-23 PROCEDURE — 93925 LOWER EXTREMITY STUDY: CPT | Performed by: SURGERY

## 2022-09-26 ENCOUNTER — OFFICE VISIT (OUTPATIENT)
Dept: WOUND CARE | Facility: CLINIC | Age: 52
End: 2022-09-26
Payer: OTHER MISCELLANEOUS

## 2022-09-26 VITALS
RESPIRATION RATE: 18 BRPM | HEART RATE: 97 BPM | TEMPERATURE: 98.5 F | SYSTOLIC BLOOD PRESSURE: 147 MMHG | DIASTOLIC BLOOD PRESSURE: 94 MMHG

## 2022-09-26 DIAGNOSIS — L89.523 PRESSURE INJURY OF LEFT ANKLE, STAGE 3 (HCC): Primary | ICD-10-CM

## 2022-09-26 DIAGNOSIS — G82.20 PARAPLEGIA (HCC): ICD-10-CM

## 2022-09-26 PROCEDURE — 99213 OFFICE O/P EST LOW 20 MIN: CPT | Performed by: STUDENT IN AN ORGANIZED HEALTH CARE EDUCATION/TRAINING PROGRAM

## 2022-09-26 NOTE — PROGRESS NOTES
Patient ID: Claudia Stoner is a 47 y o  male Date of Birth 1970       Chief Complaint   Patient presents with    Follow Up Wound Care Visit     Left leg wound       Allergies:  Codeine, Dilaudid [hydromorphone], and Morphine and related    Diagnosis:   Diagnosis ICD-10-CM Associated Orders   1  Pressure injury of left ankle, stage 3 ([facility])  L89 523 Wound cleansing and dressings     Durable Medical Equipment     mupirocin (BACTROBAN) 2 % ointment   2  Paraplegia ([facility])  G82 20 Wound cleansing and dressings     Durable Medical Equipment        Assessment  & Plan:     F/u stage 3 pressure injury of L ankle  Is measuring smaller in size  Has fibrinous base with indurated and edematous periwound  No erythema, maceration, lymphangitic streaking present  o Continue with mupirocin to the site of the wound and keep covered to avoid infection  Refill for mupirocin sent to pharmacy  o Continue with Spandagrip for compression  Order sent for box of [address] [place] [address] for supplies  Continue with biking/activity in order to help reduce edema  o Discussed low sodium diet for edema control    o Results of recent arterial testing below, there is no evidence of arterial disease  KAYLEN and toe pressures within healing range  o Continue to offload the wound and ensure no significant pressure is on ankle at night, particularly while sleeping    o Monitor for signs of infection such as change in drainage, surrounding redness, fevers, chills    o F/u in approximately one month  Instructed to call if any questions or concerns arise in meantime  VAS lower limb arterial duplex, complete bilateral 09/22/22:   Impression:  RIGHT LOWER LIMB:  No evidence of significant lower extremity arterial occlusive disease    Ankle/Brachial index:   1 14, normal   PVR/ PPG tracings are normal   Metatarsal pressure of 155 mmHg  Great toe pressure of 95 mmHg, within the healing range     LEFT LOWER LIMB:  No evidence of significant lower extremity arterial occlusive disease  Ankle/Brachial index:   1 11, normal   PVR/ PPG tracings are normal   Metatarsal pressure of 126 mmHg  Great toe pressure of 127 mmHg, within the healing range  Subjective:   9/27/21 Pt is a 49 y o  paraplegic M who presents for initial evaluation of wound on medial aspect of LLE and wound on Rt second toe and abrasion on lateral aspect of RLE  Pt states LLE wound has been reoccurring since 2014  Pt was seen 2 years ago at [facility] wound center but since then has been adamant on not returning  Pt has been applying Dermawound (betadine based) to wound and covering it dry dressing  Pt denies any drainage, fever or chills  10/4/21:  Patient returns regarding his pressure injury of the left medial malleolus  He continues using his Dermawound cream   He had been treated with two courses of antibiotics prior to coming here  One was doxycycline and the other clindamycin  He states that really had no effect  Other spots on the legs have stopped draining  11/1/21:  Patient returns after hospitalization for COVID-19  He states that he is doing well now but had a rough time in the hospital   He complains of both legs being swollen the right worse than the left  He received a Prevelon type boot to offload his left ankle but it appears that it is missing velcro to tighten the straps  He states that he has been using the Dermawound ointment with the exception while hospitalized  Even though the ulcer of the left medial ankle has gotten much larger, he believes that the base has improved  He believes that this is good  In the past, he has been very resistant to any recommendations that I had from a wound care perspective  I gave him time to continue using his ointment  11/15/21: Followup pressure injury 3 of the left medial ankle  He continue using his Dermawound ointment  He has been treated recently for cellulitis and UTI    Two round of antibiotic, 1st being Levaquin and the 2nd Keflex  Cellulitis was slightly atypical with erythema of both lower extremities and increased edema  11/21/21: Followup stage III pressure injury of the left medial ankle  Continues with his DermaWound ointment  No complaints  12/13/21:  Followup stage III pressure injury of the left medial ankle  Continues with dermal Wound ointment  He feels that it is slowly improving  No other new complaints  1/3/22: Followup stage III pressure injury of the left medial ankle  Continues to use Dermawound  Notes that it is significantly improved  May be due to using thicker socks in his boots  1/24/22: Followup stage III pressure injury of the left medial malleolus  Continues with Dermawound  Patient notes improvement  No other symptoms  2/14/22: Followup stage III pressure injury of the left medial malleolus  Continues with the Dermawound  He continues to see improvement  No complaints  3/24/22: With chronic recurrent pressure ulcer in dependent, edematous leg  Patient elevates at night, but dependent in the day, will not allow wrap, but was provided with an ace wrap so that he can wrap it himself as he sees fit  3/29/22:  Patient returns regarding his stage III pressure injury of his left medial malleolus  Saw Dr Virgen Else last week who debrided and started Acticoat 3  He notes since using the current dressing, the ulcer has improved significantly  He is still concerned about an area of erythema adjacent to the open ulcer that started about two days ago  He believes is increasing  He has not had any increased warmth at the site, fever or chills  4/18/22: Followup stage III pressure injury of the left medial ankle  At last visit, culture was obtained in there is no growth  Acticoat was prescribed which he continues to use  However recently, he noted deterioration and slight increased drainage  5/2/22: Followup stage III pressure injury of the left medial ankle  Alginate was sticking because of decreased drainage  He did not like that therefore he is now using black drawing salve and gauze  In addition, he thought that the rim surrounding the ulcer was edematous and had material that need to be expressed  Therefore squeezed very hard around it and expressed clear fluid  He believes that this help  However, he questions why there is persisting rim of erythema  5/16/22: Followup stage III pressure injury L medial ankle  He continues to use black drawing salve  States the darker rim around the wound is somewhat improved with the steroid ointment and has just started to reuse the steroid ointment two days ago  Denies fever or chills  No increase in pain or drainage from wound  06/06/22: Followup stage III pressure injury of L medial ankle  Has been continuing to use black drawing salve  Healing has stalled  States that he can only sleep on his R side and therefore pressure is likely still being applied to the area  Has tried boots in the past which he feels were not effective in relieving the pressure  States his diet has been poor lately and he does not get much sun because he has been in agony  No fevers, chills  06/17/22: Followup stage 3 pressure injury of L medial ankle  Since last appointment, pt went to the emergency department d/t fever and cellulitis of L ankle and was septic  CT done in hospital r/o yesika gas collection  MRI additionally showed no evidence of collection and pt was d/c on course of doxycycline  Pt states he will not use the silver alginate again, as he would like to check the wound daily d/t the most recent events  Has been using Dermawound to the wound bed  States after beginning antibiotic and using this gel the wound looks 100% better than it did when he presented to ED        07/25/22: Followup stage 3 pressure injury of L medial ankle  Pt has been using Mupirocin since last visit   Wound looks  Pt was hospitalized mid June and notes that he was on a course of doxycylcine  Has since finished the course of doxy but is concerned that the skin is more shiny around his wound and that this may indicate an ongoing infection  Notes his edema is improving with riding his bike  No fevers or chills  08/15/22: Followup stage 3 pressure injury of the L medial ankle  Has been using Mupirocin  Culture taken at last visit demonstrated no growth  There is less redness of the periwound  His edema is better controlled now that he has began biking  Is continuing to be active  No fevers or chills  09/26/22: F/u stage 3 pressure injury of the L medial ankle  Has been using Mupirocin to the site of the wound  Notes a small amount of green tinge to the drainage  Results of recent arterial testing demonstrated KAYLEN of LLE within normal limits, and toe pressure within healing range and no evidence of significant LE arterial disease  Pt is currently controlling his edema with a Spandagrip and continues to bike  Not always making low sodium or healthy diet choices d/t circumstance  He offloads this area of his ankle by staggering his feet while sleeping so that the ankle is in contact with his memory foam mattress  No fevers or chills           The following portions of the patient's history were reviewed and updated as appropriate:   Patient Active Problem List   Diagnosis    Atrial fibrillation with RVR ([address] )    Multifocal pneumonia    Sepsis without septic shock ([address] )    Paraplegia ([address] )    Pneumonia due to COVID-19 virus    Acute respiratory failure with hypoxia ([address] )    Pressure injury of left ankle, stage 3 (HCC)    Edema of both legs    Hypokalemia    Intermittent self-catheterization of bladder     Past Medical History:   Diagnosis Date    Back injuries     Neurogenic bladder     Paraplegia ([address] )     Renal disorder      Past Surgical History:   Procedure Laterality Date    BACK SURGERY      NEPHRECTOMY       Family History   Problem Relation Age of Onset    Cancer Brother      Social History     Socioeconomic History    Marital status: Single     Spouse name: None    Number of children: None    Years of education: None    Highest education level: None   Occupational History    None   Tobacco Use    Smoking status: Never Smoker    Smokeless tobacco: Never Used   Vaping Use    Vaping Use: Never used   Substance and Sexual Activity    Alcohol use: Never    Drug use: No    Sexual activity: None   Other Topics Concern    None   Social History Narrative    None     Social Determinants of Health     Financial Resource Strain: Not on file   Food Insecurity: No Food Insecurity    Worried About Running Out of Food in the Last Year: Never true    Anuj of Food in the Last Year: Never true   Transportation Needs: No Transportation Needs    Lack of Transportation (Medical): No    Lack of Transportation (Non-Medical): No   Physical Activity: Not on file   Stress: Not on file   Social Connections: Not on file   Intimate Partner Violence: Not on file   Housing Stability: Low Risk     Unable to Pay for Housing in the Last Year: No    Number of Places Lived in the Last Year: 1    Unstable Housing in the Last Year: No       Current Outpatient Medications:     mupirocin (BACTROBAN) 2 % ointment, Apply topically daily Apply topically to wound daily   Cover with gauze and bordered foam , Disp: 22 g, Rfl: 0    cholecalciferol (VITAMIN D3) 1,000 units tablet, Take 2 tablets (2,000 Units total) by mouth daily, Disp: 30 tablet, Rfl: 0    diazepam (VALIUM) 5 mg tablet, Take 10 mg by mouth daily at bedtime as needed for anxiety , Disp: , Rfl:     gabapentin (NEURONTIN) 400 mg capsule, Take 400 mg by mouth 3 (three) times a day, Disp: , Rfl:     oxybutynin (DITROPAN-XL) 10 MG 24 hr tablet, Take 10 mg by mouth 2 (two) times a day, Disp: , Rfl:     TiZANidine (ZANAFLEX) 4 MG capsule, Take 4 mg by mouth daily at bedtime, Disp: , Rfl:     triamcinolone (KENALOG) 0 5 % cream, Apply topically 2 (two) times a day (Patient not taking: Reported on 6/19/2022), Disp: 30 g, Rfl: 0    Review of Systems   Constitutional: Negative for chills and fever  Cardiovascular: Positive for leg swelling (Right greater than left)  Endocrine: Negative  Genitourinary: Positive for difficulty urinating  Musculoskeletal: Positive for gait problem (paraplegia)  Negative for back pain  Skin: Positive for wound (LLE medial aspect of the ankle)  Negative for rash  Allergic/Immunologic: Negative  Neurological: Positive for weakness (Paraplegia) and numbness (Lower extremities)  Hematological: Does not bruise/bleed easily  Psychiatric/Behavioral: Negative  Negative for confusion  Objective:  /94   Pulse 97   Temp 98 5 °F (36 9 °C)   Resp 18   Pain Score: 0-No pain     Physical Exam  Vitals and nursing note reviewed  Constitutional:       General: He is not in acute distress  Appearance: Normal appearance  He is well-developed and normal weight  He is not ill-appearing  HENT:      Head: Normocephalic and atraumatic  Eyes:      Conjunctiva/sclera: Conjunctivae normal    Cardiovascular:      Rate and Rhythm: Normal rate  Pulses:           Dorsalis pedis pulses are 2+ on the right side  Pulmonary:      Effort: Pulmonary effort is normal  No respiratory distress  Musculoskeletal:      Right lower leg: Edema present  Left lower leg: Edema present  Feet:    Feet:      Left foot:      Skin integrity: Ulcer present  Comments: Ulceration with fibrinous tissue in the base- wound bed appears dry  Slight reduction in size from previous visit, but overall appearance is largely unchanged  There is a  area of induration surrounding the wound  No significant drainage  No malodor  No lymphangitic streaking or signs of acute infection present  Skin:     General: Skin is warm and dry  Findings: Wound present        Comments: Neurological:      Mental Status: He is alert and oriented to person, place, and time  Motor: Weakness (Paraplegia) present  Gait: Gait abnormal (paraplegia )  Psychiatric:         Attention and Perception: Attention normal          Mood and Affect: Mood and affect normal          Behavior: Behavior is cooperative  Cognition and Memory: Cognition normal               Wound 03/24/22 Pressure Injury Ankle Left;Medial (Active)   Wound Description Pink;Yellow 09/26/22 1434   Tory-wound Assessment Pink;Purple; Induration 09/26/22 1434   Wound Length (cm) 0 3 cm 09/26/22 1434   Wound Width (cm) 0 6 cm 09/26/22 1434   Wound Depth (cm) 0 1 cm 09/26/22 1434   Wound Surface Area (cm^2) 0 18 cm^2 09/26/22 1434   Wound Volume (cm^3) 0 018 cm^3 09/26/22 1434   Calculated Wound Volume (cm^3) 0 02 cm^3 09/26/22 1434   Change in Wound Size % 95 65 09/26/22 1434   Drainage Amount Scant 09/26/22 1434   Drainage Description Serous 09/26/22 1434   Non-staged Wound Description Full thickness 09/26/22 1434   Treatments Cleansed 09/26/22 1434           Results from last 6 Months   Lab Units 07/25/22  1455   WOUND CULTURE  No growth           Wound Instructions:  Orders Placed This Encounter   Procedures    Wound cleansing and dressings     Wound cleansing and dressings       Left ankle wound:       Wash your hands with soap and water  Remove old dressing, discard into plastic bag and place in trash  Cleanse the left ankle ulcer with soap (unscented Dove) and water prior to applying a clean dressing  Do not use tissue or cotton balls  Do not scrub the wound  Pat dry using gauze        Shower yes, keep dressing clean and dry   If dressing gets wet change dressing as soon as possible         Apply Bactroban (mupirocin) gel to the left ankle ulcer  Cover with Allevyn LIFE silicone bordered foam  Change dressing DAILY and as needed for excessive drainage or leakage   May apply ace wrap from base of toes to below knee at home        OFFLOADING:   Off-loading Instructions:       Keep weight and pressure off wound at all times  and Other -Elastogel to periwound       Elastic Tubular Stocking Size F      Tubular elastic bandage: Apply from base of toes to behind the knee  Apply in AM, may remove for sleep        Avoid prolonged standing in one place        Elevate leg(s) above the level of the heart when sitting or as much as possible        Follow up at [address] in THREE weeks         Standing Status:   Future     Standing Expiration Date:   9/26/2023   [facility]     One box of Tubigrip Size F for compression per month refill 12 times       Yun Harvey PA-C          Portions of the record may have been created with voice recognition software  Occasional wrong word or "sound alike" substitutions may have occurred due to the inherent limitations of voice recognition software  Read the chart carefully and recognize, using context, where substitutions have occurred

## 2022-09-26 NOTE — PATIENT INSTRUCTIONS
Orders Placed This Encounter   Procedures    Wound cleansing and dressings     Wound cleansing and dressings       Left ankle wound:       Wash your hands with soap and water  Remove old dressing, discard into plastic bag and place in trash  Cleanse the left ankle ulcer with soap (unscented Dove) and water prior to applying a clean dressing  Do not use tissue or cotton balls  Do not scrub the wound  Pat dry using gauze  Shower yes, keep dressing clean and dry  If dressing gets wet change dressing as soon as possible         Apply Bactroban (mupirocin) gel to the left ankle ulcer  Cover with Allevyn LIFE silicone bordered foam  Change dressing DAILY and as needed for excessive drainage or leakage  May apply ace wrap from base of toes to below knee at home  OFFLOADING:   Off-loading Instructions:       Keep weight and pressure off wound at all times  and Other -Elastogel to periwound       Elastic Tubular Stocking Size F      Tubular elastic bandage: Apply from base of toes to behind the knee  Apply in AM, may remove for sleep  Avoid prolonged standing in one place  Elevate leg(s) above the level of the heart when sitting or as much as possible  Follow up at [address] [facility] in THREE weeks          Standing Status:   Future     Standing Expiration Date:   9/26/2023

## 2022-10-11 PROBLEM — A41.9 SEPSIS WITHOUT SEPTIC SHOCK (HCC): Status: RESOLVED | Noted: 2020-06-04 | Resolved: 2022-10-11

## 2022-10-12 PROBLEM — U07.1 PNEUMONIA DUE TO COVID-19 VIRUS: Status: RESOLVED | Noted: 2021-10-11 | Resolved: 2022-10-12

## 2022-10-12 PROBLEM — J12.82 PNEUMONIA DUE TO COVID-19 VIRUS: Status: RESOLVED | Noted: 2021-10-11 | Resolved: 2022-10-12

## 2022-11-04 ENCOUNTER — OFFICE VISIT (OUTPATIENT)
Dept: WOUND CARE | Facility: CLINIC | Age: 52
End: 2022-11-04

## 2022-11-04 VITALS
SYSTOLIC BLOOD PRESSURE: 145 MMHG | HEART RATE: 75 BPM | RESPIRATION RATE: 18 BRPM | DIASTOLIC BLOOD PRESSURE: 87 MMHG | TEMPERATURE: 98.4 F

## 2022-11-04 DIAGNOSIS — L89.523 PRESSURE INJURY OF LEFT ANKLE, STAGE 3 (HCC): Primary | ICD-10-CM

## 2022-11-04 DIAGNOSIS — G82.20 PARAPLEGIA (HCC): ICD-10-CM

## 2022-11-04 DIAGNOSIS — R60.0 EDEMA OF BOTH LEGS: ICD-10-CM

## 2022-11-04 NOTE — PROGRESS NOTES
Patient ID: Willie Real is a 47 y o  male Date of Birth 1970       Chief Complaint   Patient presents with   • Follow Up Wound Care Visit       Allergies:  Codeine, Dilaudid [hydromorphone], and Morphine and related    Diagnosis:   Diagnosis ICD-10-CM Associated Orders   1  Pressure injury of left ankle, stage 3 ([facility])  L89 523 Wound cleansing and dressings   2  Paraplegia ([facility])  G82 20    3  Edema of both legs  R60 0         Assessment  & Plan:    • F/u pressure injury of L ankle  There has been a reduction in size since previous visit  There is very small opening remaining with scant serous drainage  Surrounding skin is tough and raised  No surrounding erythema, lymphangitic streaking  o Continue with use of Mupirocin to the site of the wound  o Continue with Spandagrip for compression  o Continue with biking, use of bike has aided in edema reduction and thus has assisted with wound healing    o Will consider compression wrap with coflex or unna boot in future if healing stalls  o Discussed maintenance compression following healing of wounds will likely be necessary for edema control and prevention of future breakdown    o Instructed to monitor for any changes including redness or swelling surrounding the wound, increased drainage or pain as well as fevers or chills     o F/u in three weeks  Instructed to call if any questions or concerns arise in meantime  Subjective:   9/27/21 Pt is a 49 y o  paraplegic M who presents for initial evaluation of wound on medial aspect of LLE and wound on Rt second toe and abrasion on lateral aspect of RLE  Pt states LLE wound has been reoccurring since 2014  Pt was seen 2 years ago at [facility] wound center but since then has been adamant on not returning  Pt has been applying Dermawound (betadine based) to wound and covering it dry dressing  Pt denies any drainage, fever or chills        10/4/21:  Patient returns regarding his pressure injury of the left medial malleolus  He continues using his Dermawound cream   He had been treated with two courses of antibiotics prior to coming here  One was doxycycline and the other clindamycin  He states that really had no effect  Other spots on the legs have stopped draining  11/1/21:  Patient returns after hospitalization for COVID-19  He states that he is doing well now but had a rough time in the hospital   He complains of both legs being swollen the right worse than the left  He received a Prevelon type boot to offload his left ankle but it appears that it is missing velcro to tighten the straps  He states that he has been using the Dermawound ointment with the exception while hospitalized  Even though the ulcer of the left medial ankle has gotten much larger, he believes that the base has improved  He believes that this is good  In the past, he has been very resistant to any recommendations that I had from a wound care perspective  I gave him time to continue using his ointment  11/15/21: Followup pressure injury 3 of the left medial ankle  He continue using his Dermawound ointment  He has been treated recently for cellulitis and UTI  Two round of antibiotic, 1st being Levaquin and the 2nd Keflex  Cellulitis was slightly atypical with erythema of both lower extremities and increased edema  11/21/21: Followup stage III pressure injury of the left medial ankle  Continues with his DermaWound ointment  No complaints  12/13/21:  Followup stage III pressure injury of the left medial ankle  Continues with dermal Wound ointment  He feels that it is slowly improving  No other new complaints  1/3/22: Followup stage III pressure injury of the left medial ankle  Continues to use Dermawound  Notes that it is significantly improved  May be due to using thicker socks in his boots  1/24/22: Followup stage III pressure injury of the left medial malleolus  Continues with Dermawound  Patient notes improvement  No other symptoms  2/14/22: Followup stage III pressure injury of the left medial malleolus  Continues with the Dermawound  He continues to see improvement  No complaints  3/24/22: With chronic recurrent pressure ulcer in dependent, edematous leg  Patient elevates at night, but dependent in the day, will not allow wrap, but was provided with an ace wrap so that he can wrap it himself as he sees fit  3/29/22:  Patient returns regarding his stage III pressure injury of his left medial malleolus  Saw Dr Priya Summers last week who debrided and started Acticoat 3  He notes since using the current dressing, the ulcer has improved significantly  He is still concerned about an area of erythema adjacent to the open ulcer that started about two days ago  He believes is increasing  He has not had any increased warmth at the site, fever or chills  4/18/22: Followup stage III pressure injury of the left medial ankle  At last visit, culture was obtained in there is no growth  Acticoat was prescribed which he continues to use  However recently, he noted deterioration and slight increased drainage  5/2/22: Followup stage III pressure injury of the left medial ankle  Alginate was sticking because of decreased drainage  He did not like that therefore he is now using black drawing salve and gauze  In addition, he thought that the rim surrounding the ulcer was edematous and had material that need to be expressed  Therefore squeezed very hard around it and expressed clear fluid  He believes that this help  However, he questions why there is persisting rim of erythema  5/16/22: Followup stage III pressure injury L medial ankle  He continues to use black drawing salve  States the darker rim around the wound is somewhat improved with the steroid ointment and has just started to reuse the steroid ointment two days ago  Denies fever or chills   No increase in pain or drainage from wound      06/06/22: Followup stage III pressure injury of L medial ankle  Has been continuing to use black drawing salve  Healing has stalled  States that he can only sleep on his R side and therefore pressure is likely still being applied to the area  Has tried boots in the past which he feels were not effective in relieving the pressure  States his diet has been poor lately and he does not get much sun because he has been in agony  No fevers, chills  06/17/22: Followup stage 3 pressure injury of L medial ankle  Since last appointment, pt went to the emergency department d/t fever and cellulitis of L ankle and was septic  CT done in hospital r/o yesika gas collection  MRI additionally showed no evidence of collection and pt was d/c on course of doxycycline  Pt states he will not use the silver alginate again, as he would like to check the wound daily d/t the most recent events  Has been using Dermawound to the wound bed  States after beginning antibiotic and using this gel the wound looks 100% better than it did when he presented to ED        07/25/22: Followup stage 3 pressure injury of L medial ankle  Pt has been using Mupirocin since last visit  Wound looks  Pt was hospitalized mid June and notes that he was on a course of doxycylcine  Has since finished the course of doxy but is concerned that the skin is more shiny around his wound and that this may indicate an ongoing infection  Notes his edema is improving with riding his bike  No fevers or chills  08/15/22: Followup stage 3 pressure injury of the L medial ankle  Has been using Mupirocin  Culture taken at last visit demonstrated no growth  There is less redness of the periwound  His edema is better controlled now that he has began biking  Is continuing to be active  No fevers or chills  09/26/22: F/u stage 3 pressure injury of the L medial ankle  Has been using Mupirocin to the site of the wound   Notes a small amount of green tinge to the drainage  Results of recent arterial testing demonstrated KAYLEN of LLE within normal limits, and toe pressure within healing range and no evidence of significant LE arterial disease  Pt is currently controlling his edema with a Spandagrip and continues to bike  Not always making low sodium or healthy diet choices d/t circumstance  He offloads this area of his ankle by staggering his feet while sleeping so that the ankle is in contact with his memory foam mattress  No fevers or chills  11/04/22: F/u stage 3 pressure injury of the L medial ankle  Has been using Mupirocin and Spandagrip for compression as well as continuing with biking to reduce edema in his leg  Has seen improvement since previous visit and the wound is small and almost closed  No fevers, chills, other complaints           The following portions of the patient's history were reviewed and updated as appropriate:   Patient Active Problem List   Diagnosis   • Atrial fibrillation with RVR ([facility] [address] )   • Multifocal pneumonia   • Paraplegia ([facility] [address] )   • Acute respiratory failure with hypoxia ([facility])   • Pressure injury of left ankle, stage 3 ([facility])   • Edema of both legs   • Hypokalemia   • Intermittent self-catheterization of bladder     Past Medical History:   Diagnosis Date   • Back injuries    • Neurogenic bladder    • Paraplegia ([address] )    • Renal disorder      Past Surgical History:   Procedure Laterality Date   • BACK SURGERY     • NEPHRECTOMY       Family History   Problem Relation Age of Onset   • Cancer Brother      Social History     Socioeconomic History   • Marital status: Single     Spouse name: Not on file   • Number of children: Not on file   • Years of education: Not on file   • Highest education level: Not on file   Occupational History   • Not on file   Tobacco Use   • Smoking status: Never Smoker   • Smokeless tobacco: Never Used   Vaping Use   • Vaping Use: Never used   Substance and Sexual Activity   • Alcohol use: Never   • Drug use: No   • Sexual activity: Not on file   Other Topics Concern   • Not on file   Social History Narrative   • Not on file     Social Determinants of Health     Financial Resource Strain: Not on file   Food Insecurity: No Food Insecurity   • Worried About Running Out of Food in the Last Year: Never true   • Ran Out of Food in the Last Year: Never true   Transportation Needs: No Transportation Needs   • Lack of Transportation (Medical): No   • Lack of Transportation (Non-Medical): No   Physical Activity: Not on file   Stress: Not on file   Social Connections: Not on file   Intimate Partner Violence: Not on file   Housing Stability: Low Risk    • Unable to Pay for Housing in the Last Year: No   • Number of Places Lived in the Last Year: 1   • Unstable Housing in the Last Year: No       Current Outpatient Medications:   •  cholecalciferol (VITAMIN D3) 1,000 units tablet, Take 2 tablets (2,000 Units total) by mouth daily, Disp: 30 tablet, Rfl: 0  •  diazepam (VALIUM) 5 mg tablet, Take 10 mg by mouth daily at bedtime as needed for anxiety , Disp: , Rfl:   •  gabapentin (NEURONTIN) 400 mg capsule, Take 400 mg by mouth 3 (three) times a day, Disp: , Rfl:   •  mupirocin (BACTROBAN) 2 % ointment, Apply topically daily Apply topically to wound daily  Cover with gauze and bordered foam , Disp: 22 g, Rfl: 0  •  oxybutynin (DITROPAN-XL) 10 MG 24 hr tablet, Take 10 mg by mouth 2 (two) times a day, Disp: , Rfl:   •  TiZANidine (ZANAFLEX) 4 MG capsule, Take 4 mg by mouth daily at bedtime, Disp: , Rfl:   •  triamcinolone (KENALOG) 0 5 % cream, Apply topically 2 (two) times a day (Patient not taking: Reported on 6/19/2022), Disp: 30 g, Rfl: 0    Review of Systems   Constitutional: Negative for chills and fever  Cardiovascular: Positive for leg swelling (Right greater than left)  Endocrine: Negative  Genitourinary: Positive for difficulty urinating  Musculoskeletal: Positive for gait problem (paraplegia)  Negative for back pain     Skin: Positive for wound (LLE medial aspect of the ankle)  Negative for rash  Allergic/Immunologic: Negative  Neurological: Positive for weakness (Paraplegia) and numbness (Lower extremities)  Hematological: Does not bruise/bleed easily  Psychiatric/Behavioral: Negative  Negative for confusion  Objective:  /87   Pulse 75   Temp 98 4 °F (36 9 °C)   Resp 18   Pain Score: 0-No pain     Physical Exam  Vitals and nursing note reviewed  Constitutional:       General: He is not in acute distress  Appearance: Normal appearance  He is well-developed and normal weight  He is not ill-appearing  HENT:      Head: Normocephalic and atraumatic  Eyes:      Conjunctiva/sclera: Conjunctivae normal    Cardiovascular:      Rate and Rhythm: Normal rate  Pulses:           Dorsalis pedis pulses are 2+ on the right side  Pulmonary:      Effort: Pulmonary effort is normal  No respiratory distress  Musculoskeletal:      Right lower leg: Edema present  Left lower leg: Edema present  Feet:    Feet:      Left foot:      Skin integrity: Ulcer present  Comments: Pressure injury of L ankle  There has been a reduction in size since previous visit  There is very small opening remaining with scant serous drainage  Surrounding skin is tough and raised  No surrounding erythema, lymphangitic streaking  Skin:     General: Skin is warm and dry  Findings: Wound present  Comments:      Neurological:      Mental Status: He is alert and oriented to person, place, and time  Motor: Weakness (Paraplegia) present  Gait: Gait abnormal (paraplegia )  Psychiatric:         Attention and Perception: Attention normal          Mood and Affect: Mood and affect normal          Behavior: Behavior is cooperative           Cognition and Memory: Cognition normal          Wound 03/24/22 Pressure Injury Ankle Left;Medial (Active)   Wound Image   11/04/22 1404   Wound Description Pink 11/04/22 2405 Tory-wound Assessment Pink;Purple; Induration 11/04/22 1404   Wound Length (cm) 0 1 cm 11/04/22 1404   Wound Width (cm) 0 2 cm 11/04/22 1404   Wound Depth (cm) 0 1 cm 11/04/22 1404   Wound Surface Area (cm^2) 0 02 cm^2 11/04/22 1404   Wound Volume (cm^3) 0 002 cm^3 11/04/22 1404   Calculated Wound Volume (cm^3) 0 cm^3 11/04/22 1404   Change in Wound Size % 100 11/04/22 1404   Drainage Amount Scant 11/04/22 1404   Drainage Description Serous 11/04/22 1404   Non-staged Wound Description Full thickness 11/04/22 1404   Treatments Cleansed 11/04/22 1404   Patient Tolerance Tolerated well 06/06/22 1438                Results from last 6 Months   Lab Units 07/25/22  1455   WOUND CULTURE  No growth           Wound Instructions:  Orders Placed This Encounter   Procedures   • Wound cleansing and dressings     Wound cleansing and dressings       Left ankle wound:       Wash your hands with soap and water  Remove old dressing, discard into plastic bag and place in trash  Cleanse the left ankle ulcer with soap (unscented Dove) and water prior to applying a clean dressing  Do not use tissue or cotton balls  Do not scrub the wound  Pat dry using gauze        Shower yes, keep dressing clean and dry   If dressing gets wet change dressing as soon as possible         Apply Bactroban (mupirocin) gel to the left ankle ulcer  Cover with Allevyn LIFE silicone bordered foam  Change dressing DAILY and as needed for excessive drainage or leakage  May apply ace wrap from base of toes to below knee at home        OFFLOADING:   Off-loading Instructions:       Keep weight and pressure off wound at all times  and Other -Elastogel to periwound       Elastic Tubular Stocking Size F       Tubular elastic bandage: Apply from base of toes to behind the knee   Apply in AM, may remove for sleep        Avoid prolonged standing in one place        Elevate leg(s) above the level of the heart when sitting or as much as possible        Follow up at Wound Center in THREE weeks          Standing Status:   Future     Standing Expiration Date:   11/4/2023       Zulema Lr PA-C        Portions of the record may have been created with voice recognition software  Occasional wrong word or "sound alike" substitutions may have occurred due to the inherent limitations of voice recognition software  Read the chart carefully and recognize, using context, where substitutions have occurred

## 2022-11-04 NOTE — PATIENT INSTRUCTIONS
Orders Placed This Encounter   Procedures    Wound cleansing and dressings     Wound cleansing and dressings       Left ankle wound:       Wash your hands with soap and water  Remove old dressing, discard into plastic bag and place in trash  Cleanse the left ankle ulcer with soap (unscented Dove) and water prior to applying a clean dressing  Do not use tissue or cotton balls  Do not scrub the wound  Pat dry using gauze  Shower yes, keep dressing clean and dry  If dressing gets wet change dressing as soon as possible         Apply Bactroban (mupirocin) gel to the left ankle ulcer  Cover with Allevyn LIFE silicone bordered foam  Change dressing DAILY and as needed for excessive drainage or leakage  May apply ace wrap from base of toes to below knee at home  OFFLOADING:   Off-loading Instructions:       Keep weight and pressure off wound at all times  and Other -Elastogel to periwound       Elastic Tubular Stocking Size F       Tubular elastic bandage: Apply from base of toes to behind the knee  Apply in AM, may remove for sleep  Avoid prolonged standing in one place  Elevate leg(s) above the level of the heart when sitting or as much as possible  [facility]           Standing Status:   Future     Standing Expiration Date:   11/4/2023

## 2023-06-13 ENCOUNTER — VBI (OUTPATIENT)
Dept: ADMINISTRATIVE | Facility: OTHER | Age: 53
End: 2023-06-13

## 2023-08-15 ENCOUNTER — VBI (OUTPATIENT)
Dept: ADMINISTRATIVE | Facility: OTHER | Age: 53
End: 2023-08-15

## 2023-12-08 ENCOUNTER — VBI (OUTPATIENT)
Dept: ADMINISTRATIVE | Facility: OTHER | Age: 53
End: 2023-12-08

## 2024-01-26 ENCOUNTER — VBI (OUTPATIENT)
Dept: ADMINISTRATIVE | Facility: OTHER | Age: 54
End: 2024-01-26

## 2024-02-21 PROBLEM — J18.9 MULTIFOCAL PNEUMONIA: Status: RESOLVED | Noted: 2020-06-04 | Resolved: 2024-02-21

## 2024-08-29 ENCOUNTER — VBI (OUTPATIENT)
Dept: ADMINISTRATIVE | Facility: OTHER | Age: 54
End: 2024-08-29

## 2024-08-29 NOTE — TELEPHONE ENCOUNTER
08/29/24 11:36 AM     Chart reviewed for CRC: Colonoscopy ; nothing is submitted to the patient's insurance at this time.     Sharon Koehler   [facility]

## 2024-12-05 ENCOUNTER — VBI (OUTPATIENT)
Dept: ADMINISTRATIVE | Facility: OTHER | Age: 54
End: 2024-12-05

## 2024-12-06 NOTE — TELEPHONE ENCOUNTER
12/05/24 8:14 PM     Chart reviewed for ; nothing is submitted to the patient's insurance at this time.     Sharon Koehler   [facility]

## 2025-01-18 ENCOUNTER — VBI (OUTPATIENT)
Dept: ADMINISTRATIVE | Facility: OTHER | Age: 55
End: 2025-01-18

## 2025-01-18 NOTE — TELEPHONE ENCOUNTER
01/18/25 12:35 PM     Chart reviewed for  ; nothing is submitted to the patient's insurance at this time.     Sharon Koehler   [facility]

## 2025-07-29 ENCOUNTER — VBI (OUTPATIENT)
Dept: ADMINISTRATIVE | Facility: OTHER | Age: 55
End: 2025-07-29